# Patient Record
Sex: MALE | Race: WHITE | NOT HISPANIC OR LATINO | Employment: OTHER | URBAN - METROPOLITAN AREA
[De-identification: names, ages, dates, MRNs, and addresses within clinical notes are randomized per-mention and may not be internally consistent; named-entity substitution may affect disease eponyms.]

---

## 2017-01-05 ENCOUNTER — ALLSCRIPTS OFFICE VISIT (OUTPATIENT)
Dept: OTHER | Facility: OTHER | Age: 52
End: 2017-01-05

## 2017-01-18 ENCOUNTER — TRANSCRIBE ORDERS (OUTPATIENT)
Dept: ADMINISTRATIVE | Facility: HOSPITAL | Age: 52
End: 2017-01-18

## 2017-01-18 DIAGNOSIS — N63.10 MASS OF RIGHT BREAST: Primary | ICD-10-CM

## 2017-01-20 ENCOUNTER — HOSPITAL ENCOUNTER (OUTPATIENT)
Dept: RADIOLOGY | Facility: HOSPITAL | Age: 52
Discharge: HOME | End: 2017-01-20
Attending: FAMILY MEDICINE
Payer: COMMERCIAL

## 2017-01-20 DIAGNOSIS — N63.10 MASS OF RIGHT BREAST: ICD-10-CM

## 2017-01-20 PROCEDURE — 76642 ULTRASOUND BREAST LIMITED: CPT

## 2017-03-02 ENCOUNTER — HOSPITAL ENCOUNTER (OUTPATIENT)
Dept: RADIOLOGY | Facility: HOSPITAL | Age: 52
Discharge: HOME | End: 2017-03-02
Attending: ORTHOPAEDIC SURGERY
Payer: COMMERCIAL

## 2017-03-02 ENCOUNTER — ALLSCRIPTS OFFICE VISIT (OUTPATIENT)
Dept: OTHER | Facility: OTHER | Age: 52
End: 2017-03-02

## 2017-03-02 DIAGNOSIS — Z47.1 AFTERCARE FOLLOWING JOINT REPLACEMENT SURGERY: ICD-10-CM

## 2017-03-02 PROCEDURE — 73502 X-RAY EXAM HIP UNI 2-3 VIEWS: CPT

## 2017-03-22 ENCOUNTER — ALLSCRIPTS OFFICE VISIT (OUTPATIENT)
Dept: OTHER | Facility: OTHER | Age: 52
End: 2017-03-22

## 2017-03-30 ENCOUNTER — GENERIC CONVERSION - ENCOUNTER (OUTPATIENT)
Dept: OTHER | Facility: OTHER | Age: 52
End: 2017-03-30

## 2018-01-10 NOTE — CONSULTS
Therapy  Rehabilitation Services Referral:   Patient Status: post-operative  Diagnosis: Right Anterior total hip arthroplasty  Rehabilitation Services: evaluate and treat patient as needed and initiate a home exercise program  Precautions/Comments: It is advised per Dr Gracie Kasper for the patient to attend physical therapy 2 times a week for 4 weeks  Frequency: 2 times per week, for 4 weeks  Please send progress report  I hereby certify that the services indicated above are medically necessary  Chief Complaint  Chief Complaint Free Text Note Form: Right hip pain      History of Present Illness  HPI: Patient comes in today with complaints of right hip pain  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  Over the last few weeks its gotten worse  He has not had any treatment to date  Review of Systems  Focused-Male Orthopedics:   Constitutional: No fever or chills, feels well, no tiredness, no recent weight loss or weight gain  Eyes: No complaints of red eyes, no eyesight problems  ENT: no complaints of loss of hearing, no nosebleeds, no sore throat  Cardiovascular: No complaints of chest pain, no palpitations, no leg claudication or lower extremity edema  Respiratory: No complaints of shortness of breath, no wheezing, no cough  Gastrointestinal: No complaints of abdominal pain, no constipation, no nausea or vomiting, no diarrhea or bloody stools  Genitourinary: No complaints of dysuria or incontinence, no hesitancy, no nocturia  Musculoskeletal: as noted in HPI  Integumentary: No complaints of skin rash or lesion, no itching or dry skin, no skin wounds  Neurological: No complaints of headache, no confusion, no numbness or tingling, no dizziness  Psychiatric: No suicidal thoughts, no anxiety, no depression  Endocrine: No muscle weakness, no frequent urination, no excessive thirst, no feelings of weakness        Past Medical History  Active Problems And Past Medical History Reviewed: The active problems and past medical history were reviewed and updated today  Surgical History  Surgical History Reviewed: The surgical history was reviewed and updated today  Family History  Family History Reviewed: The family history was reviewed and updated today  Social History  Social History Reviewed: The social history was reviewed and updated today  The social history was reviewed and is unchanged  Current Meds  Medication List Reviewed: The medication list was reviewed and updated today  Physical Exam  Musculoskeletal Multi-System Exam:  Physical exam of the right hip there is pain with internal and external rotation  Negative straight leg raise  Normal sensation in the right lower extremity  Distal pulse present in the right lower extremity  NVI  Constitutional - General appearance: Normal    Psychiatric - Orientation to person, place, and time: Normal  Mood and affect: Normal       Results/Data  Xray: Hip:   Views: of the right hip  Findings: no fracture and no dislocation  Diagnostic Studies Reviewed: I personally reviewed the films/images/results in the office today  My interpretation follows  X-ray Review X-ray of the right hip Shows osteoarthritis  Discussion/Summary  Discussion Summary:   Patient was seen and examined by Dr Comer Kayser and myself today and we discussed with the patient that he does have asked arthritis in his right hip  We are going to send him for her right hip intra-articular cortisone injection  Physical therapy will also be set up for the patient today  All questions were Answered to the patient's satisfaction And we'll see him back in the office In 3 weeks for followup evaluation        Signatures   Electronically signed by : Wilfredo Castelan, [facility]; Oct 20 2016 11:09AM EST                       (Author)

## 2018-01-12 VITALS
HEART RATE: 77 BPM | WEIGHT: 247 LBS | RESPIRATION RATE: 17 BRPM | BODY MASS INDEX: 34.58 KG/M2 | SYSTOLIC BLOOD PRESSURE: 137 MMHG | DIASTOLIC BLOOD PRESSURE: 88 MMHG | HEIGHT: 71 IN

## 2018-01-13 VITALS
HEIGHT: 71 IN | HEART RATE: 73 BPM | SYSTOLIC BLOOD PRESSURE: 136 MMHG | BODY MASS INDEX: 34.58 KG/M2 | WEIGHT: 247 LBS | DIASTOLIC BLOOD PRESSURE: 86 MMHG

## 2018-01-14 VITALS
HEIGHT: 71 IN | HEART RATE: 74 BPM | SYSTOLIC BLOOD PRESSURE: 150 MMHG | BODY MASS INDEX: 34.58 KG/M2 | DIASTOLIC BLOOD PRESSURE: 102 MMHG | WEIGHT: 247 LBS

## 2018-02-02 ENCOUNTER — OFFICE VISIT (OUTPATIENT)
Dept: PODIATRY | Facility: CLINIC | Age: 53
End: 2018-02-02
Payer: COMMERCIAL

## 2018-02-02 VITALS
HEART RATE: 87 BPM | RESPIRATION RATE: 17 BRPM | DIASTOLIC BLOOD PRESSURE: 87 MMHG | WEIGHT: 231 LBS | BODY MASS INDEX: 32.34 KG/M2 | SYSTOLIC BLOOD PRESSURE: 137 MMHG | HEIGHT: 71 IN

## 2018-02-02 DIAGNOSIS — R26.2 DIFFICULTY WALKING: ICD-10-CM

## 2018-02-02 DIAGNOSIS — M21.969 DEFORMITY OF FOOT, UNSPECIFIED LATERALITY: ICD-10-CM

## 2018-02-02 DIAGNOSIS — Q66.50 CONGENITAL PES PLANUS, UNSPECIFIED LATERALITY: Primary | ICD-10-CM

## 2018-02-02 DIAGNOSIS — M21.962 DEFORMITY OF LEFT FOOT: ICD-10-CM

## 2018-02-02 DIAGNOSIS — M79.671 PAIN IN BOTH FEET: ICD-10-CM

## 2018-02-02 DIAGNOSIS — M79.672 LEFT FOOT PAIN: ICD-10-CM

## 2018-02-02 DIAGNOSIS — M79.672 PAIN IN BOTH FEET: ICD-10-CM

## 2018-02-02 DIAGNOSIS — M25.572 PAIN IN JOINTS OF BOTH FEET: ICD-10-CM

## 2018-02-02 DIAGNOSIS — M25.571 PAIN IN JOINTS OF BOTH FEET: ICD-10-CM

## 2018-02-02 PROCEDURE — 99212 OFFICE O/P EST SF 10 MIN: CPT | Performed by: PODIATRIST

## 2018-02-02 PROCEDURE — L1902 AFO ANKLE GAUNTLET PRE OTS: HCPCS | Performed by: PODIATRIST

## 2018-02-02 RX ORDER — EZETIMIBE 10 MG/1
10 TABLET ORAL DAILY
Refills: 1 | COMMUNITY
Start: 2018-01-26 | End: 2019-11-14

## 2018-02-02 RX ORDER — LOSARTAN POTASSIUM 100 MG/1
100 TABLET ORAL
COMMUNITY
Start: 2017-05-24 | End: 2021-07-26

## 2018-02-02 RX ORDER — SIMVASTATIN 40 MG
TABLET ORAL
COMMUNITY

## 2018-02-02 RX ORDER — MELOXICAM 15 MG/1
TABLET ORAL
Refills: 0 | COMMUNITY
Start: 2017-12-21 | End: 2020-12-22

## 2018-02-02 RX ORDER — MELOXICAM 15 MG/1
15 TABLET ORAL
COMMUNITY
Start: 2018-01-17 | End: 2019-01-17

## 2018-02-02 RX ORDER — ETODOLAC 400 MG/1
400 TABLET, FILM COATED ORAL 2 TIMES DAILY
Qty: 60 TABLET | Refills: 0 | Status: SHIPPED | OUTPATIENT
Start: 2018-02-02 | End: 2018-02-23 | Stop reason: SDUPTHER

## 2018-02-02 NOTE — PROGRESS NOTES
Assessment/Plan:  Tendinitis secondary to pronation syndrome  Patient has osteoarthritis  Plan  We will immobilize patient's left lower extremity was semi custom molded ankle-foot orthotic  We will changes anti-inflammatory medication  Patient may need custom molded bracing  Patient may need arch reconstruction  He will return in 3 weeks for evaluation     There are no diagnoses linked to this encounter  Discussion/Summary  Possible side effects of new medications were reviewed with the patient/guardian today  The treatment plan was reviewed with the patient/guardian  The patient/guardian understands and agrees with the treatment plan      Chief Complaint  B/L foot pain      History of Present Illness  HPI: Patient has nonspecific pain and swelling in his feet at the end of the day  Patient also gets tingling in his toes  He does not have a history of low back pain  He has no problem with his hands  Review of Systems     Constitutional: No fever or chills, feels well, no tiredness, no recent weight loss or weight gain  Eyes: No complaints of red eyes, no eyesight problems  ENT: no complaints of loss of hearing, no nosebleeds, no sore throat  Cardiovascular: No complaints of chest pain, no palpitations, no leg claudication or lower extremity edema  Respiratory: No complaints of shortness of breath, no wheezing, no cough  Gastrointestinal: No complaints of abdominal pain, no constipation, no nausea or vomiting, no diarrhea or bloody stools  Genitourinary: No complaints of dysuria or incontinence, no hesitancy, no nocturia  Musculoskeletal: as noted in HPI  Integumentary: No complaints of skin rash or lesion, no itching or dry skin, no skin wounds  Neurological: No complaints of headache, no confusion, no numbness or tingling, no dizziness  Psychiatric: No suicidal thoughts, no anxiety, no depression     Endocrine: No muscle weakness, no frequent urination, no excessive thirst, no feelings of weakness  Active Problems   1  Acquired flat foot (734) (M21 40)   2  Acquired Hallux Valgus (735 0)   3  Acquired pes planus (734) (M21 40)   4  Aftercare following right hip joint replacement surgery (V54 81,V43 64) (Z47 1,Z96 641)   5  Arthralgia of ankle or foot (719 47) (M25 579)   6  Calcaneal spur (726 73) (M77 30)   7  Congenital pes planus of left foot (754 61) (Q66 52)   8  Difficulty in walking (719 7) (R26 2)   9  Pain in extremity (729 5) (M79 609)   10  Plantar fibromatosis (728 71) (M72 2)   11  Posterior tibial tendinitis, left (726 72) (M76 822)   12  Primary localized osteoarthrosis of right hip (715 15) (M16 11)   13  Primary localized osteoarthrosis of the hip (715 15) (M16 10)   14  Right hip pain (719 45) (M25 551)     Past Medical History     The active problems and past medical history were reviewed and updated today  Surgical History   · History of Wrist Surgery     The surgical history was reviewed and updated today  Family History     The family history was reviewed and updated today  Social History   · Never a smoker   · Never A Smoker  The social history was reviewed and updated today  The social history was reviewed and is unchanged  Current Meds   1  AmLODIPine Besylate 5 MG Oral Tablet; Therapy: (Recorded:11Jun2015) to Recorded   2  CoQ-10 100 MG Oral Capsule; Therapy: (Recorded:18Oct2013) to Recorded   3  Meloxicam 15 MG Oral Tablet; TAKE 1 TABLET DAILY WITH FOOD; Therapy: 62DFW5686 to (Jany Gandara)  Requested for: 24HZP0382; Last   Rx:05Jan2017 Ordered   4  Meloxicam 15 MG Oral Tablet; TAKE 1 TABLET DAILY WITH FOOD; Therapy: 91LTQ7487 to (Jerry Huffman)  Requested for: 35RVN9576; Last   Rx:08Jan2016 Ordered   5  Meloxicam 15 MG Oral Tablet; TAKE 1 TABLET DAILY WITH FOOD; Therapy: 41EPI8919 to (Evaluate:06Jan2017)  Requested for: 77Mne4444; Last   Rx:70Dzi6046 Ordered   6  NexIUM PACK;    Therapy: (Recorded:20Oct2016) to Recorded   7  Simvastatin 40 MG Oral Tablet; Therapy: (Recorded:55Sos2791) to Recorded     The medication list was reviewed and updated today  Allergies   1  No Known Drug Allergies     Vitals    Recorded: 83ZZS1739 01:18PM   Heart Rate 77   Respiration 17   Systolic 864   Diastolic 88   Height 5 ft 11 in   Weight 247 lb    BMI Calculated 34 45   BSA Calculated 2 31      Physical Exam  Left Foot: Appearance: Normal except as noted: excessive aejkafxvzNIl0lyg planus  Great toe deformities include a bunion  The left foot demonstrates the metatarsus adductus  Tenderness: None except the medial longitudinal arch,Cn3qtgfjk orukwynRDq2tfnlnswlk of the plantar fascia, tdtZc7kgs of the medial odevfvpbhjYNy6lga the posterior tibialis tendon  ROM: subtalar motion was restricted  Motor: Normal   Right Foot: Appearance: Normal except as noted: excessive pronation  Great toe deformities include a bunion  Left Ankle: Tenderness: None except the posterior tibialis tendon  ROM: limited ROM in all planes Motor: Normal    Right Ankle: ROM: limited ROM in all planes   Neurological Exam: performed  Light touch was intact bilaterally  Vibratory sensation was intact bilaterally  Response to monofilament test was intact bilaterally  Deep tendon reflexes: patellar reflex present kckgtanizxiAPu3dohrxroj reflex present bilaterally  Vascular Exam: performed Dorsalis pedis pulses were present bilaterally  Posterior tibial pulses were present bilaterally  Elevation Pallor: absent bilaterally  Dependence rubor was absent bilaterally  Edema: moderate gbgoorgczisPAj62/4 pitting edema bilateral  Negative Homans sign bilateral    Toenails: All of the toenails were hypertrophied  Hyperkeratosis: present on both first toes  Subjective:     Patient ID: Yariel Lindsay is a 47 y o  male  Patient has continuing and ongoing pain in his feet  He has pain at the end of the day  No history of trauma    Patient has pain with ambulation  He is using orthotics  He has seronegative for arthropathy  Patient has not have Lyme  Review of Systems      Objective:      Foot ExamPhysical Exam

## 2018-02-23 ENCOUNTER — OFFICE VISIT (OUTPATIENT)
Dept: PODIATRY | Facility: CLINIC | Age: 53
End: 2018-02-23
Payer: COMMERCIAL

## 2018-02-23 VITALS — HEIGHT: 71 IN | BODY MASS INDEX: 33.6 KG/M2 | WEIGHT: 240 LBS

## 2018-02-23 DIAGNOSIS — M25.571 PAIN IN JOINTS OF BOTH FEET: ICD-10-CM

## 2018-02-23 DIAGNOSIS — Q66.50 CONGENITAL PES PLANUS, UNSPECIFIED LATERALITY: ICD-10-CM

## 2018-02-23 DIAGNOSIS — M76.822 POSTERIOR TIBIAL TENDINITIS OF LEFT LOWER EXTREMITY: ICD-10-CM

## 2018-02-23 DIAGNOSIS — M25.572 ARTHRALGIA OF LEFT FOOT: ICD-10-CM

## 2018-02-23 DIAGNOSIS — M25.571 ARTHRALGIA OF RIGHT FOOT: Primary | ICD-10-CM

## 2018-02-23 DIAGNOSIS — M25.572 PAIN IN JOINTS OF BOTH FEET: ICD-10-CM

## 2018-02-23 PROCEDURE — 20605 DRAIN/INJ JOINT/BURSA W/O US: CPT | Performed by: PODIATRIST

## 2018-02-23 PROCEDURE — 99212 OFFICE O/P EST SF 10 MIN: CPT | Performed by: PODIATRIST

## 2018-02-23 RX ORDER — ETODOLAC 400 MG/1
400 TABLET, FILM COATED ORAL 2 TIMES DAILY
Qty: 60 TABLET | Refills: 0 | Status: SHIPPED | OUTPATIENT
Start: 2018-02-23 | End: 2020-06-02

## 2018-02-23 RX ORDER — EZETIMIBE AND SIMVASTATIN 10; 10 MG/1; MG/1
1 TABLET ORAL
COMMUNITY
End: 2020-12-22

## 2018-02-23 NOTE — PROGRESS NOTES
Procedures   Foot Exam    Review of Systems     Constitutional: No fever or chills, feels well, no tiredness, no recent weight loss or weight gain  Eyes: No complaints of red eyes, no eyesight problems  ENT: no complaints of loss of hearing, no nosebleeds, no sore throat  Cardiovascular: No complaints of chest pain, no palpitations, no leg claudication or lower extremity edema  Respiratory: No complaints of shortness of breath, no wheezing, no cough  Gastrointestinal: No complaints of abdominal pain, no constipation, no nausea or vomiting, no diarrhea or bloody stools  Genitourinary: No complaints of dysuria or incontinence, no hesitancy, no nocturia  Musculoskeletal: as noted in HPI  Integumentary: No complaints of skin rash or lesion, no itching or dry skin, no skin wounds  Neurological: No complaints of headache, no confusion, no numbness or tingling, no dizziness  Psychiatric: No suicidal thoughts, no anxiety, no depression     Endocrine: No muscle weakness, no frequent urination, no excessive thirst, no feelings of weakness       Active Problems   1  Acquired flat foot (734) (M21 40)   2  Acquired Hallux Valgus (735 0)   3  Acquired pes planus (734) (M21 40)   4  Aftercare following right hip joint replacement surgery (V54 81,V43 64) (Z47 1,Z96 641)   5  Arthralgia of ankle or foot (719 47) (M25 579)   6  Calcaneal spur (726 73) (M77 30)   7  Congenital pes planus of left foot (754 61) (Q66 52)   8  Difficulty in walking (719 7) (R26 2)   9  Pain in extremity (729 5) (M79 609)   10  Plantar fibromatosis (728 71) (M72 2)   11  Posterior tibial tendinitis, left (726 72) (B73 805)   12  Primary localized osteoarthrosis of right hip (715 15) (M16 11)   13  Primary localized osteoarthrosis of the hip (715 15) (M16 10)   14  Right hip pain (719 45) (M25 551)     Past Medical History     The active problems and past medical history were reviewed and updated today       Surgical History   · History of Wrist Surgery     The surgical history was reviewed and updated today        Family History     The family history was reviewed and updated today        Social History   · Never a smoker   · Never A Smoker  The social history was reviewed and updated today  The social history was reviewed and is unchanged       Current Meds   1  AmLODIPine Besylate 5 MG Oral Tablet;   Therapy: (Recorded:11Jun2015) to Recorded   2  CoQ-10 100 MG Oral Capsule;   Therapy: (COVXCRNL:76WLA9657) to Recorded   3  Meloxicam 15 MG Oral Tablet; TAKE 1 TABLET DAILY WITH FOOD;  Casey Milks: 33WBB7523 to (Fadi Ridley)  Requested for: 19RWK2044; Last   Rx:05Jan2017 Ordered   4  Meloxicam 15 MG Oral Tablet; TAKE 1 TABLET DAILY WITH FOOD;   Therapy: 16SXI8749 to (Nic Conway)  Requested for: 31ELV9873; Last   Rx:08Jan2016 Ordered   5  Meloxicam 15 MG Oral Tablet; TAKE 1 TABLET DAILY WITH FOOD;  Casey Milks: 79SBE4826 to (Evaluate:06Jan2017)  Requested for: 96Wgc8504; Last   Rx:00Aqa0332 Ordered   6  NexIUM PACK;   Therapy: (Imani Aleman) to Recorded   7  Simvastatin 40 MG Oral Tablet;   Therapy: (Recorded:18Oct2013) to Recorded     The medication list was reviewed and updated today       Allergies   1  No Known Drug Allergies     Vitals    Recorded: 22Mar2017 01:18PM   Heart Rate 77   Respiration 17   Systolic 279   Diastolic 88   Height 5 ft 11 in   Weight 247 lb    BMI Calculated 34 45   BSA Calculated 2 31      Physical Exam  Left Foot: Appearance: Normal except as noted: excessive kpmwvnitoQUe8umj planus  Great toe deformities include a bunion  The left foot demonstrates the metatarsus adductus  Tenderness: None except the medial longitudinal arch,So8tvkhyw znwyswjRLj2sxhxnvyxw of the plantar fascia, xlbHd5wmz of the medial ziimvxnanbYTi5wki the posterior tibialis tendon  ROM: subtalar motion was restricted  Motor: Normal   Right Foot: Appearance: Normal except as noted: excessive pronation   Great toe deformities include a bunion  Left Ankle: Tenderness: None except the posterior tibialis tendon  ROM: limited ROM in all planes Motor: Normal    Right Ankle: ROM: limited ROM in all planes   Neurological Exam: performed  Light touch was intact bilaterally  Vibratory sensation was intact bilaterally  Response to monofilament test was intact bilaterally  Deep tendon reflexes: patellar reflex present qhjimchktcnCOi1vycbijhz reflex present bilaterally  Vascular Exam: performed Dorsalis pedis pulses were present bilaterally  Posterior tibial pulses were present bilaterally  Elevation Pallor: absent bilaterally  Dependence rubor was absent bilaterally  Edema: moderate ygpnqqkbumwDPi72/4 pitting edema bilateral  Negative Homans sign bilateral    Toenails: All of the toenails were hypertrophied  Hyperkeratosis: present on both first toes       Patient has continued pain in both ankles  Patient has history of hip replacement  Patient has osteoarthritis  Plan  Today bilateral ankle joint arthrocentesis done  Into each ankle, 1 cc of Kenalog 10 was injected without complication  MRI will be ordered to rule out left ankle tendinitis origin  Physical therapy will be ordered  Patient will use bracing

## 2018-03-06 ENCOUNTER — TELEPHONE (OUTPATIENT)
Dept: PODIATRY | Facility: CLINIC | Age: 53
End: 2018-03-06

## 2018-03-19 VITALS — HEIGHT: 71 IN | WEIGHT: 258 LBS | BODY MASS INDEX: 36.12 KG/M2

## 2018-03-19 DIAGNOSIS — M76.822 POSTERIOR TIBIAL TENDON DYSFUNCTION, LEFT: Primary | ICD-10-CM

## 2018-03-19 PROCEDURE — 99244 OFF/OP CNSLTJ NEW/EST MOD 40: CPT | Performed by: ORTHOPAEDIC SURGERY

## 2018-03-19 NOTE — PROGRESS NOTES
H&P Exam - Orthopedics   Link Mauricio 47 y o  male MRN: [id number]  Unit/Bed#:  Encounter: [id number]    Assessment/Plan     Assessment:  Posterior tibialis tendon dysfunction, left  Plan:  Marie Davila has chronic posterior tibialis tendon dysfunction  There is a partial tear on MRI today though I do not feel this is a surgical case at this time  In my opinion he will benefit from continued physical therapy and the use of his orthotics  He will follow up with me as needed  We did discuss possible need in the future for tendon transfer and calcaneal osteotomy  Scribe Attestation    I,:   Chio Brand MA am acting as a scribe while in the presence of the attending physician :        I,:   Nanda Loera MD personally performed the services described in this documentation    as scribed in my presence :            History of Present Illness   HPI:  Link Mauricio is a 47 y o  male who was referred to us today by Dr Jason Blue for a partial tear of the left posterior tibialis tendon  This is a chronic issue and has been prescribed physical therapy as well as orthotics  Marie Davila states he experienced immediate improvement with physical therapy  His complaint today is of the persistent mild to moderate ache about the medial ankle following a day of work  The pain will radiate into the foot on occasion 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  Previously his pain was quite severe but is grateful for the effort of his physical therapist who has decreased his pain significantly  Review of Systems   Constitutional: Negative for chills, fever and unexpected weight change  HENT: Negative for hearing loss, nosebleeds and sore throat  Eyes: Negative for pain, redness and visual disturbance  Respiratory: Negative for cough, shortness of breath and wheezing  Cardiovascular: Negative for chest pain, palpitations and leg swelling     Gastrointestinal: Negative for abdominal pain, nausea and vomiting  Endocrine: Negative for polyphagia and polyuria  Genitourinary: Negative for dysuria and hematuria  Musculoskeletal:        See HPI   Skin: Negative for rash and wound  Neurological: Negative for dizziness, numbness and headaches  Psychiatric/Behavioral: Negative for decreased concentration and suicidal ideas  The patient is not nervous/anxious  Historical Information   Past Medical History:   Diagnosis Date    Acquired pes planus     Arthralgia of ankle     Calcaneal spur     Chronic hip pain     Essential hypertension 2/28/2016    Flat foot     Flat foot 2/28/2016    GERD (gastroesophageal reflux disease)     Hallux valgus     High cholesterol     Hyperlipidemia 2/28/2016    Hypertension     Plantar fibromatosis     Posterior tibial tendinitis      Past Surgical History:   Procedure Laterality Date    COLONOSCOPY      EVACUATION OF HEMATOMA Left     knee    HAND SURGERY      reconstruction of tendons in hand s/p inury/laceration    OSTEOCHONDROMA EXCISION      left knee    CO TOTAL HIP ARTHROPLASTY Right 2/29/2016    Procedure: ARTHROPLASTY HIP TOTAL ANTERIOR;  Surgeon: Kimberlyn Mora MD;  Location: [facility];  Service: Orthopedics     Social History   History   Alcohol Use    16 8 oz/week    28 Cans of beer per week     History   Drug Use No     History   Smoking Status    Never Smoker   Smokeless Tobacco    Never Used     Family History:   Family History   Problem Relation Age of Onset    Diabetes Mother     Atrial fibrillation Mother     Lymphoma Father     COPD Father     Heart disease Father        Meds/Allergies   all medications and allergies reviewed  No Known Allergies    Objective   Vitals: Height 5' 11" (1 803 m), weight 117 kg (258 lb)  ,Body mass index is 35 98 kg/m²      [unfilled]    [unfilled]    Invasive Devices          No matching active lines, drains, or airways          Physical Exam   Constitutional: He is oriented to person, place, and time  He appears well-developed and well-nourished  HENT:   Head: Normocephalic and atraumatic  Eyes: Conjunctivae and EOM are normal    Neck: Normal range of motion  Cardiovascular: Normal rate  Pulmonary/Chest: Effort normal    Musculoskeletal: Normal range of motion  Neurological: He is alert and oriented to person, place, and time  Skin: Skin is warm and dry  Psychiatric: He has a normal mood and affect  His behavior is normal  Judgment and thought content normal      Left Ankle Exam   Swelling: mild (medial)    Tenderness   Left ankle tenderness location: posterior tibialis tendon tenderness distal of the medial malleolus       Range of Motion   Dorsiflexion: normal   Plantar flexion: normal   Inversion: normal   Eversion: normal     Muscle Strength   Dorsiflexion:  5/5   Plantar flexion:  5/5   Anterior tibial:  5/5   Posterior tibial:  4/5  Peroneal muscle:  5/5    Tests   Anterior drawer: negative  Varus tilt: negative    Other   Erythema: absent  Sensation: normal  Pulse: present    Comments:  Pes planus            Lab Results:   None todayLab Results:  Imaging: MRI of the left ankle taken from the outside source was reviewed today and presents with a partial tear of the posterior tibialis tendon  EKG, Pathology, and Other Studies: None today    Code Status: [unfilled]  Advance Directive and Living Will:      Power of :    POLST:

## 2018-03-23 ENCOUNTER — OFFICE VISIT (OUTPATIENT)
Dept: PODIATRY | Facility: CLINIC | Age: 53
End: 2018-03-23
Payer: COMMERCIAL

## 2018-03-23 VITALS — BODY MASS INDEX: 36.12 KG/M2 | WEIGHT: 258 LBS | RESPIRATION RATE: 17 BRPM | HEIGHT: 71 IN

## 2018-03-23 DIAGNOSIS — M76.822 POSTERIOR TIBIAL TENDINITIS OF LEFT LOWER EXTREMITY: Primary | ICD-10-CM

## 2018-03-23 DIAGNOSIS — Q66.51 CONGENITAL PES PLANUS OF RIGHT FOOT: ICD-10-CM

## 2018-03-23 DIAGNOSIS — Q66.52 CONGENITAL PES PLANUS OF LEFT FOOT: ICD-10-CM

## 2018-03-23 DIAGNOSIS — M25.572 PAIN IN JOINTS OF BOTH FEET: ICD-10-CM

## 2018-03-23 DIAGNOSIS — M21.969 ACQUIRED DEFORMITY OF FOOT, UNSPECIFIED LATERALITY: ICD-10-CM

## 2018-03-23 DIAGNOSIS — M25.571 PAIN IN JOINTS OF BOTH FEET: ICD-10-CM

## 2018-03-23 PROCEDURE — 99213 OFFICE O/P EST LOW 20 MIN: CPT | Performed by: PODIATRIST

## 2018-03-23 PROCEDURE — L3000 FT INSERT UCB BERKELEY SHELL: HCPCS | Performed by: PODIATRIST

## 2018-03-23 NOTE — PROGRESS NOTES
Assessment/Plan:  Tendinitis secondary to pes planus and abnormal gait  Plan  Patient will continue therapy  His feet have been casted for custom molded foot orthotics  No problem-specific Assessment & Plan notes found for this encounter  There are no diagnoses linked to this encounter  Subjective:      Patient ID: Felicia Castellano is a 47 y o  male  Patient is doing better  He has diagnosis of tendinitis  He is now undergoing physical therapy  He was seen by Orthopedics  No surgery indicated  He is using bracing as directed  The following portions of the patient's history were reviewed and updated as appropriate: allergies, current medications, past family history, past medical history, past social history, past surgical history and problem list     Review of Systems      Objective:  Review of Systems     Constitutional: No fever or chills, feels well, no tiredness, no recent weight loss or weight gain  Eyes: No complaints of red eyes, no eyesight problems  ENT: no complaints of loss of hearing, no nosebleeds, no sore throat  Cardiovascular: No complaints of chest pain, no palpitations, no leg claudication or lower extremity edema  Respiratory: No complaints of shortness of breath, no wheezing, no cough  Gastrointestinal: No complaints of abdominal pain, no constipation, no nausea or vomiting, no diarrhea or bloody stools  Genitourinary: No complaints of dysuria or incontinence, no hesitancy, no nocturia  Musculoskeletal: as noted in HPI  Integumentary: No complaints of skin rash or lesion, no itching or dry skin, no skin wounds  Neurological: No complaints of headache, no confusion, no numbness or tingling, no dizziness  Psychiatric: No suicidal thoughts, no anxiety, no depression     Endocrine: No muscle weakness, no frequent urination, no excessive thirst, no feelings of weakness       Active Problems   1  Acquired flat foot (734) (M21 40)   2  Acquired Hallux Valgus (735 0)   3  Acquired pes planus (734) (M21 40)   4  Aftercare following right hip joint replacement surgery (V54 81,V43 64) (Z47 1,Z96 641)   5  Arthralgia of ankle or foot (719 47) (M25 579)   6  Calcaneal spur (726 73) (M77 30)   7  Congenital pes planus of left foot (754 61) (Q66 52)   8  Difficulty in walking (719 7) (R26 2)   9  Pain in extremity (729 5) (M79 609)   10  Plantar fibromatosis (728 71) (M72 2)   11  Posterior tibial tendinitis, left (726 72) (L53 333)   12  Primary localized osteoarthrosis of right hip (715 15) (M16 11)   13  Primary localized osteoarthrosis of the hip (715 15) (M16 10)   14  Right hip pain (719 45) (M25 551)     Past Medical History     The active problems and past medical history were reviewed and updated today       Surgical History   · History of Wrist Surgery     The surgical history was reviewed and updated today        Family History     The family history was reviewed and updated today        Social History   · Never a smoker   · Never A Smoker  The social history was reviewed and updated today   The social history was reviewed and is unchanged       Current Meds   1  AmLODIPine Besylate 5 MG Oral Tablet;   Therapy: (Recorded:2015) to Recorded   2  CoQ-10 100 MG Oral Capsule;   Therapy: ([id number]) to Recorded   3  Meloxicam 15 MG Oral Tablet; TAKE 1 TABLET DAILY WITH FOOD;   Therapy: [id number] to (Kofi You)  Requested for: 50UYI1656; Last   Rx:2017 Ordered   4  Meloxicam 15 MG Oral Tablet; TAKE 1 TABLET DAILY WITH FOOD;   Therapy: 30JQX2187 to (Aiden Martha)  Requested for: 71FVV4363; Last   Rx:2016 Ordered   5  Meloxicam 15 MG Oral Tablet; TAKE 1 TABLET DAILY WITH FOOD;  Reyes Buhl: 22YUL8002 to (Evaluate:2017)  Requested for: 87Wwv0657; Last   Rx:77Nfw3035 Ordered   6  NexIUM PACK;   Therapy: (Recorded:2016) to Recorded   7  Simvastatin 40 MG Oral Tablet;   Therapy: (VGFVBEKI:59SSA0044) to Recorded     The medication list was reviewed and updated today       Allergies   1  No Known Drug Allergies     Vitals    Recorded: 41LIS1491 01:18PM   Heart Rate 77   Respiration 17   Systolic 978   Diastolic 88   Height 5 ft 11 in   Weight 247 lb    BMI Calculated 34 45   BSA Calculated 2 31      Physical Exam  Left Foot: Appearance: Normal except as noted: excessive xqojfgmxcAHn6bto planus  Great toe deformities include a bunion  The left foot demonstrates the metatarsus adductus  Tenderness: None except the medial longitudinal arch,Rm1hrqtan siaglctYCv7nimiqeejv of the plantar fascia, usxLy5kmo of the medial ucmjktglewXLm6msu the posterior tibialis tendon  ROM: subtalar motion was restricted  Motor: Normal   Right Foot: Appearance: Normal except as noted: excessive pronation  Great toe deformities include a bunion  Left Ankle: Tenderness: None except the posterior tibialis tendon  ROM: limited ROM in all planes Motor: Normal    Right Ankle: ROM: limited ROM in all planes   Neurological Exam: performed  Light touch was intact bilaterally  Vibratory sensation was intact bilaterally  Response to monofilament test was intact bilaterally  Deep tendon reflexes: patellar reflex present qdwbfsjkrjwWHw5obqharyb reflex present bilaterally  Vascular Exam: performed Dorsalis pedis pulses were present bilaterally  Posterior tibial pulses were present bilaterally  Elevation Pallor: absent bilaterally  Dependence rubor was absent bilaterally  Edema: moderate opwehtqapbxIIe17/4 pitting edema bilateral  Negative Homans sign bilateral    Toenails: All of the toenails were hypertrophied  Hyperkeratosis: present on both first toes  Foot ExamPhysical Exam   Musculoskeletal:   Complete collapse of left medial arch noted  There is pain with palpation posterior tibial tendon

## 2019-08-15 ENCOUNTER — APPOINTMENT (OUTPATIENT)
Dept: LAB | Facility: HOSPITAL | Age: 54
End: 2019-08-15
Payer: COMMERCIAL

## 2019-08-15 ENCOUNTER — OFFICE VISIT (OUTPATIENT)
Dept: OBGYN CLINIC | Facility: HOSPITAL | Age: 54
End: 2019-08-15
Payer: COMMERCIAL

## 2019-08-15 ENCOUNTER — HOSPITAL ENCOUNTER (OUTPATIENT)
Dept: RADIOLOGY | Facility: HOSPITAL | Age: 54
Discharge: HOME | End: 2019-08-15
Attending: ORTHOPAEDIC SURGERY
Payer: COMMERCIAL

## 2019-08-15 VITALS
HEIGHT: 70 IN | SYSTOLIC BLOOD PRESSURE: 145 MMHG | DIASTOLIC BLOOD PRESSURE: 89 MMHG | WEIGHT: 250 LBS | BODY MASS INDEX: 35.79 KG/M2 | HEART RATE: 80 BPM

## 2019-08-15 DIAGNOSIS — Z96.641 HX OF TOTAL HIP ARTHROPLASTY, RIGHT: ICD-10-CM

## 2019-08-15 DIAGNOSIS — Z96.641 HX OF TOTAL HIP ARTHROPLASTY, RIGHT: Primary | ICD-10-CM

## 2019-08-15 LAB
CRP SERPL QL: <3 MG/L
ERYTHROCYTE [DISTWIDTH] IN BLOOD BY AUTOMATED COUNT: 12.2 % (ref 11.6–15.1)
ERYTHROCYTE [SEDIMENTATION RATE] IN BLOOD: 7 MM/HOUR (ref 0–10)
HCT VFR BLD AUTO: 42.3 % (ref 36.5–49.3)
HGB BLD-MCNC: 14.5 G/DL (ref 12–17)
MCH RBC QN AUTO: 31.5 PG (ref 26.8–34.3)
MCHC RBC AUTO-ENTMCNC: 34.3 G/DL (ref 31.4–37.4)
MCV RBC AUTO: 92 FL (ref 82–98)
PLATELET # BLD AUTO: 340 THOUSANDS/UL (ref 149–390)
PMV BLD AUTO: 9.7 FL (ref 8.9–12.7)
RBC # BLD AUTO: 4.61 MILLION/UL (ref 3.88–5.62)
WBC # BLD AUTO: 8.74 THOUSAND/UL (ref 4.31–10.16)

## 2019-08-15 PROCEDURE — 85027 COMPLETE CBC AUTOMATED: CPT

## 2019-08-15 PROCEDURE — 85652 RBC SED RATE AUTOMATED: CPT

## 2019-08-15 PROCEDURE — 86140 C-REACTIVE PROTEIN: CPT

## 2019-08-15 PROCEDURE — 36415 COLL VENOUS BLD VENIPUNCTURE: CPT

## 2019-08-15 PROCEDURE — 73502 X-RAY EXAM HIP UNI 2-3 VIEWS: CPT

## 2019-08-15 PROCEDURE — 99213 OFFICE O/P EST LOW 20 MIN: CPT | Performed by: ORTHOPAEDIC SURGERY

## 2019-08-15 RX ORDER — PANTOPRAZOLE SODIUM 40 MG/1
TABLET, DELAYED RELEASE ORAL
COMMUNITY
Start: 2019-06-13

## 2019-08-15 NOTE — PROGRESS NOTES
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  Patient states that he has been limping for about a year these and has noted an increased pain pain in the groin and buttock area as over the past couple of weeks  He is doing his regular activities despite pain and limitations, but has had to cut back  He does have a flare-up of pain about a year and half ago which he states was improved with some PT activities  Patient denies any fevers or chills  He denies any other areas of pain  Denies any falls, accidents, or injuries  He denies any other acute complaints    ROS  Review of Systems   Constitutional: Negative for fever and unexpected weight change  HENT: Negative for hearing loss, nosebleeds and sore throat  Eyes: Negative for pain, redness and visual disturbance  Respiratory: Negative for cough, shortness of breath and wheezing  Cardiovascular: Negative for chest pain, palpitations and leg swelling  Gastrointestinal: Negative for abdominal pain, nausea and vomiting  Endocrine: Negative for polydipsia and polyuria  Genitourinary: Negative for dysuria and hematuria  Skin: Negative for rash and wound  Neurological: Negative for dizziness and headaches  Psychiatric/Behavioral: Negative for agitation and suicidal ideas         Past Medical History:   Diagnosis Date    Acquired pes planus     Arthralgia of ankle     Calcaneal spur     Chronic hip pain     Essential hypertension 2/28/2016    Flat foot     Flat foot 2/28/2016    GERD (gastroesophageal reflux disease)     Hallux valgus     High cholesterol     Hyperlipidemia 2/28/2016    Hypertension     Plantar fibromatosis     Posterior tibial tendinitis      Past Surgical History:   Procedure Laterality Date    COLONOSCOPY      EVACUATION OF HEMATOMA Left     knee    HAND SURGERY      reconstruction of tendons in hand s/p inury/laceration    OSTEOCHONDROMA EXCISION      left knee    [place] TOTAL HIP ARTHROPLASTY Right 2/29/2016    Procedure: ARTHROPLASTY HIP TOTAL ANTERIOR;  Surgeon: Aakash Nolan MD;  [facility]: [facility];  Service: Orthopedics     Results Reviewed     None          Physical Exam  Physical Exam   Constitutional: He is oriented to person, place, and time  He appears well-developed and well-nourished  HENT:   Head: Normocephalic and atraumatic  Eyes: Pupils are equal, round, and reactive to light  EOM are normal    Neck: Neck supple  No tracheal deviation present  Cardiovascular: Normal rate and regular rhythm  Pulmonary/Chest: Effort normal and breath sounds normal    Abdominal: There is no guarding  Neurological: He is alert and oriented to person, place, and time  Skin: Skin is warm and dry  Psychiatric: He has a normal mood and affect  His behavior is normal      Right Hip Exam     Tenderness   The patient is experiencing tenderness in the anterior  Range of Motion   The patient has normal right hip ROM  Muscle Strength   Flexion: 5/5     Other   Sensation: normal        Imaging  I personally reviewed these images :  Joint prosthesis in good position    Procedures  None    Assessment/Plan  49 y o  male    1  Hx of total hip arthroplasty, right  - XR hip/pelv 2-3 vws right if performed; Future  - NM bone scan 3 phase; Future    At this time, will evaluate for inflammatory versus infectious process as well as possible loosening of any hardware  If his bone scan comes back positive, we will set him up with an indium 111 scan

## 2019-08-27 ENCOUNTER — HOSPITAL ENCOUNTER (OUTPATIENT)
Dept: RADIOLOGY | Facility: HOSPITAL | Age: 54
Discharge: HOME | End: 2019-08-27
Payer: COMMERCIAL

## 2019-08-27 DIAGNOSIS — Z96.641 HX OF TOTAL HIP ARTHROPLASTY, RIGHT: ICD-10-CM

## 2019-08-27 PROCEDURE — A9503 TC99M MEDRONATE: HCPCS

## 2019-08-27 PROCEDURE — 78315 BONE IMAGING 3 PHASE: CPT

## 2019-08-29 ENCOUNTER — HOSPITAL ENCOUNTER (OUTPATIENT)
Dept: RADIOLOGY | Facility: HOSPITAL | Age: 54
Discharge: HOME | End: 2019-08-29
Attending: ORTHOPAEDIC SURGERY

## 2019-08-29 ENCOUNTER — HOSPITAL ENCOUNTER (OUTPATIENT)
Dept: RADIOLOGY | Facility: HOSPITAL | Age: 54
Discharge: HOME | End: 2019-08-29
Attending: ORTHOPAEDIC SURGERY
Payer: COMMERCIAL

## 2019-08-29 ENCOUNTER — OFFICE VISIT (OUTPATIENT)
Dept: OBGYN CLINIC | Facility: HOSPITAL | Age: 54
End: 2019-08-29
Payer: COMMERCIAL

## 2019-08-29 VITALS
HEART RATE: 78 BPM | SYSTOLIC BLOOD PRESSURE: 161 MMHG | HEIGHT: 70 IN | DIASTOLIC BLOOD PRESSURE: 96 MMHG | WEIGHT: 250 LBS | BODY MASS INDEX: 35.79 KG/M2

## 2019-08-29 DIAGNOSIS — M51.36 DDD (DEGENERATIVE DISC DISEASE), LUMBAR: ICD-10-CM

## 2019-08-29 DIAGNOSIS — M54.16 LUMBAR RADICULOPATHY: Primary | ICD-10-CM

## 2019-08-29 DIAGNOSIS — M54.16 LUMBAR RADICULOPATHY: ICD-10-CM

## 2019-08-29 DIAGNOSIS — M25.512 CHRONIC LEFT SHOULDER PAIN: ICD-10-CM

## 2019-08-29 DIAGNOSIS — M19.012 ARTHRITIS OF LEFT ACROMIOCLAVICULAR JOINT: ICD-10-CM

## 2019-08-29 DIAGNOSIS — G89.29 CHRONIC LEFT SHOULDER PAIN: ICD-10-CM

## 2019-08-29 PROCEDURE — 73030 X-RAY EXAM OF SHOULDER: CPT

## 2019-08-29 PROCEDURE — 72100 X-RAY EXAM L-S SPINE 2/3 VWS: CPT

## 2019-08-29 PROCEDURE — 99214 OFFICE O/P EST MOD 30 MIN: CPT | Performed by: ORTHOPAEDIC SURGERY

## 2019-08-29 NOTE — PROGRESS NOTES
49 y o male presents to the office for follow up of right hip pain  He has history of right CELENA in February 2016  He is here today to review the results of his bone scan  He continues to experience right hip groin, lateral hip and buttock pain  His pain increases with transitional activities  His pain occasionally wakes him up at night  He denies any numbness or tingling  He also notes left shoulder pain made worse with activities  Review of Systems  Review of systems negative unless otherwise specified in HPI    Past Medical History  Past Medical History:   Diagnosis Date    Acquired pes planus     Arthralgia of ankle     Calcaneal spur     Chronic hip pain     Essential hypertension 2/28/2016    Flat foot     Flat foot 2/28/2016    GERD (gastroesophageal reflux disease)     Hallux valgus     High cholesterol     Hyperlipidemia 2/28/2016    Hypertension     Plantar fibromatosis     Posterior tibial tendinitis        Past Surgical History  Past Surgical History:   Procedure Laterality Date    COLONOSCOPY      EVACUATION OF HEMATOMA Left     knee    HAND SURGERY      reconstruction of tendons in hand s/p inury/laceration    OSTEOCHONDROMA EXCISION      left knee    MI TOTAL HIP ARTHROPLASTY Right 2/29/2016    Procedure: ARTHROPLASTY HIP TOTAL ANTERIOR;  Surgeon: Charissa Sarmiento MD;  Location: [facility];  Service: Orthopedics       Current Medications  Current Outpatient Medications on File Prior to Visit   Medication Sig Dispense Refill    acetaminophen (TYLENOL) 325 mg tablet 650 mg po q6hrs (Patient not taking: Reported on 8/15/2019) 30 tablet 0    amLODIPine (NORVASC) 10 mg tablet Take 10 mg by mouth daily   atorvastatin (LIPITOR) 20 mg tablet Take 20 mg by mouth daily   Coenzyme Q10 (COQ-10) 100 MG CAPS Take by mouth      Coenzyme Q10 (COQ10) 100 MG CAPS Take by mouth        esomeprazole (NexIUM) 20 mg capsule Take 20 mg by mouth every morning before breakfast       etodolac (LODINE) 400 MG tablet Take 1 tablet (400 mg total) by mouth 2 (two) times a day for 30 days 60 tablet 0    ezetimibe (ZETIA) 10 mg tablet Take 10 mg by mouth daily  1    ezetimibe-simvastatin (VYTORIN) 10-10 mg per tablet Take 1 tablet by mouth daily at bedtime      losartan (COZAAR) 100 MG tablet Take 100 mg by mouth      meloxicam (MOBIC) 15 mg tablet   0    NON FORMULARY 1 tablet daily Indications: vitamin c, folic acid, and iron-per meds   pantoprazole (PROTONIX) 40 mg tablet       simvastatin (ZOCOR) 40 mg tablet Take by mouth       No current facility-administered medications on file prior to visit  Recent Labs (HCT,HGB,PT,INR,ESR,CRP,GLU,HgA1C)  0   Lab Value Date/Time    HCT 42 3 08/15/2019 1501    HGB 14 5 08/15/2019 1501    WBC 8 74 08/15/2019 1501    ESR 7 08/15/2019 1501    CRP <3 0 08/15/2019 1501         Physical exam  · General: Awake, Alert, Oriented  · Eyes: Pupils equal, round and reactive to light  · Heart: regular rate and rhythm  · Lungs: No audible wheezing  · Abdomen: soft  Right hip  · Patient ambulates without assistance  · Tender to palpation anterior hip  · Full ROM  · Strength 5/5  · Sensation intact    Left shoulder  · Tender to palpation over AC joint and subacromial bursa  · Full ROM  · Strength 5/5  · Sensation intact      Imaging  Images were personally reviewed with the patient    Bone scan 08/27/2019 was reviewed and shows no signs of loosening or infection  X-rays of lumbar spine 08/29/2019 were reviewed and shows degenerative changes  X-rays of left shoulder 08/29/2019 were reviewed and shows AC joint degenerative changes      Assessment/Plan:   54 y o male with left shoulder AC joint arthritis and lumbar radiculopathy will get a lumbar MRI for further evaluation  We wll see him back after his lumbar MRI is complete      Scribe Attestation    I,:   Dorie Marmolejo am acting as a scribe while in the presence of the attending physician :        I,:   Colin Sr Laquita Jarvis MD personally performed the services described in this documentation    as scribed in my presence :

## 2019-09-16 ENCOUNTER — HOSPITAL ENCOUNTER (OUTPATIENT)
Dept: RADIOLOGY | Facility: HOSPITAL | Age: 54
Discharge: HOME | End: 2019-09-16
Attending: ORTHOPAEDIC SURGERY
Payer: COMMERCIAL

## 2019-09-16 DIAGNOSIS — M51.36 DDD (DEGENERATIVE DISC DISEASE), LUMBAR: ICD-10-CM

## 2019-09-16 PROCEDURE — 72148 MRI LUMBAR SPINE W/O DYE: CPT

## 2019-09-19 ENCOUNTER — OFFICE VISIT (OUTPATIENT)
Dept: OBGYN CLINIC | Facility: HOSPITAL | Age: 54
End: 2019-09-19
Payer: COMMERCIAL

## 2019-09-19 VITALS
HEART RATE: 70 BPM | DIASTOLIC BLOOD PRESSURE: 101 MMHG | HEIGHT: 71 IN | SYSTOLIC BLOOD PRESSURE: 159 MMHG | BODY MASS INDEX: 35 KG/M2 | WEIGHT: 250 LBS

## 2019-09-19 DIAGNOSIS — Z96.641 HX OF TOTAL HIP ARTHROPLASTY, RIGHT: Primary | ICD-10-CM

## 2019-09-19 PROCEDURE — 99213 OFFICE O/P EST LOW 20 MIN: CPT | Performed by: ORTHOPAEDIC SURGERY

## 2019-10-08 ENCOUNTER — HOSPITAL ENCOUNTER (OUTPATIENT)
Dept: RADIOLOGY | Facility: HOSPITAL | Age: 54
Discharge: HOME | End: 2019-10-08
Payer: COMMERCIAL

## 2019-10-08 DIAGNOSIS — Z96.641 HX OF TOTAL HIP ARTHROPLASTY, RIGHT: ICD-10-CM

## 2019-10-08 PROCEDURE — 77002 NEEDLE LOCALIZATION BY XRAY: CPT

## 2019-10-08 PROCEDURE — 20610 DRAIN/INJ JOINT/BURSA W/O US: CPT

## 2019-10-08 RX ORDER — LIDOCAINE HYDROCHLORIDE 10 MG/ML
3 INJECTION, SOLUTION EPIDURAL; INFILTRATION; INTRACAUDAL; PERINEURAL ONCE
Status: COMPLETED | OUTPATIENT
Start: 2019-10-08 | End: 2019-10-08

## 2019-10-08 RX ORDER — LIDOCAINE HYDROCHLORIDE 10 MG/ML
3 INJECTION, SOLUTION INFILTRATION; PERINEURAL ONCE
Status: COMPLETED | OUTPATIENT
Start: 2019-10-08 | End: 2019-10-08

## 2019-10-08 RX ADMIN — IOHEXOL 6 ML: 240 INJECTION, SOLUTION INTRATHECAL; INTRAVASCULAR; INTRAVENOUS; ORAL at 11:45

## 2019-10-08 RX ADMIN — LIDOCAINE HYDROCHLORIDE 3 ML: 10 INJECTION, SOLUTION EPIDURAL; INFILTRATION; INTRACAUDAL; PERINEURAL at 11:58

## 2019-10-08 RX ADMIN — LIDOCAINE HYDROCHLORIDE 3 ML: 10 INJECTION, SOLUTION INFILTRATION; PERINEURAL at 11:50

## 2019-10-09 ENCOUNTER — TELEPHONE (OUTPATIENT)
Dept: OBGYN CLINIC | Facility: HOSPITAL | Age: 54
End: 2019-10-09

## 2019-10-09 NOTE — TELEPHONE ENCOUNTER
Breonna from [facility] is calling   [phone]    Beverly Willard is requesting page 5 of 5 of the Physical Therapy document (scanned in to patient's chart today under Media) be faxed back to her with a doctor's signature  She states there is supposed to be a doctor's signature box on page 5 but I do not see it        Fax [phone]

## 2019-10-09 NOTE — PROGRESS NOTES
49 y o  male presenting to the office for evaluation of right hip pain  Patient has pain in the right groin, lateral hip, and buttock areas  This increases with any activities that he has been doing  He denies any numbness or tingling down the legs  He denies any low back pain  Patient states that he has had this pain previously which was resolved with physical therapy activities  He denies any signs and symptoms of systemic infection  He denies any acute complaints  Patient did get an MRI on 9/16/18 to evaluate for lumbar involvement  ROS  Review of Systems   Constitutional: Negative for fever and unexpected weight change  HENT: Negative for hearing loss, nosebleeds and sore throat  Eyes: Negative for pain, redness and visual disturbance  Respiratory: Negative for cough, shortness of breath and wheezing  Cardiovascular: Negative for chest pain, palpitations and leg swelling  Gastrointestinal: Negative for abdominal pain, nausea and vomiting  Endocrine: Negative for polydipsia and polyuria  Genitourinary: Negative for dysuria and hematuria  Skin: Negative for rash and wound  Neurological: Negative for dizziness and headaches  Psychiatric/Behavioral: Negative for agitation and suicidal ideas         Past Medical History:   Diagnosis Date    Acquired pes planus     Arthralgia of ankle     Calcaneal spur     Chronic hip pain     Essential hypertension 2/28/2016    Flat foot     Flat foot 2/28/2016    GERD (gastroesophageal reflux disease)     Hallux valgus     High cholesterol     Hyperlipidemia 2/28/2016    Hypertension     Plantar fibromatosis     Posterior tibial tendinitis      Past Surgical History:   Procedure Laterality Date    COLONOSCOPY      EVACUATION OF HEMATOMA Left     knee    FL INJECTION RIGHT HIP (NON ARTHROGRAM)  10/8/2019    HAND SURGERY      reconstruction of tendons in hand s/p inury/laceration    OSTEOCHONDROMA EXCISION      left knee    [place] TOTAL HIP ARTHROPLASTY Right 2/29/2016    Procedure: ARTHROPLASTY HIP TOTAL ANTERIOR;  Surgeon: Charissa Sarmiento MD;  Location: [facility];  Service: Orthopedics     Results Reviewed     None          Physical Exam  Physical Exam   Constitutional: He is oriented to person, place, and time  He appears well-developed and well-nourished  HENT:   Head: Normocephalic and atraumatic  Eyes: Pupils are equal, round, and reactive to light  EOM are normal    Neck: Neck supple  No tracheal deviation present  Cardiovascular: Normal rate and regular rhythm  Pulmonary/Chest: Effort normal and breath sounds normal    Abdominal: There is no guarding  Neurological: He is alert and oriented to person, place, and time  Skin: Skin is warm and dry  Psychiatric: He has a normal mood and affect  His behavior is normal      Right Hip Exam     Tenderness   The patient is experiencing no tenderness  Range of Motion   The patient has normal right hip ROM  Muscle Strength   The patient has normal right hip strength  Other   Sensation: normal        Imaging  I personally reviewed these images :  Degenerative changes noted on the MRI, however, there is no definitive evidence of nerve compression which would correlate with his current symptomatology  Procedures  None    Assessment/Plan  49 y o  male    1  Hx of total hip arthroplasty, right  - Ambulatory referral to Physical Therapy; Future  - FL injection right hip (non arthrogram); Future    Reviewed with patient that we can try a round of physical therapy as it did work for him for a similar a related pain in the past   Reviewed with patient that he could get a diagnostic anesthetic injection into the right hip to assess for pain relief  Patient to follow up in 4-6 weeks to assess for functional improvement after these interventions as well as further options for intervention

## 2019-10-11 ENCOUNTER — OFFICE VISIT (OUTPATIENT)
Dept: OBGYN CLINIC | Facility: HOSPITAL | Age: 54
End: 2019-10-11
Payer: COMMERCIAL

## 2019-10-11 VITALS
WEIGHT: 272.8 LBS | HEIGHT: 71 IN | SYSTOLIC BLOOD PRESSURE: 155 MMHG | HEART RATE: 76 BPM | DIASTOLIC BLOOD PRESSURE: 99 MMHG | BODY MASS INDEX: 38.19 KG/M2

## 2019-10-11 DIAGNOSIS — M54.16 LUMBAR RADICULOPATHY: Primary | ICD-10-CM

## 2019-10-11 PROCEDURE — 99213 OFFICE O/P EST LOW 20 MIN: CPT | Performed by: PHYSICIAN ASSISTANT

## 2019-10-11 RX ORDER — GABAPENTIN 100 MG/1
100 CAPSULE ORAL 3 TIMES DAILY
Qty: 90 CAPSULE | Refills: 0 | Status: SHIPPED | OUTPATIENT
Start: 2019-10-11 | End: 2020-06-02

## 2019-10-11 RX ORDER — METHYLPREDNISOLONE 4 MG/1
TABLET ORAL
Qty: 21 TABLET | Refills: 0 | Status: SHIPPED | OUTPATIENT
Start: 2019-10-11 | End: 2020-06-02

## 2019-10-11 NOTE — PROGRESS NOTES
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  He has been doing physical therapy as well  He denies any numbness or tingling  He denies any strength discrepancies in the right leg  He denies any other acute complaints  ROS  Review of Systems   Constitutional: Negative for fever and unexpected weight change  HENT: Negative for hearing loss, nosebleeds and sore throat  Eyes: Negative for pain, redness and visual disturbance  Respiratory: Negative for cough, shortness of breath and wheezing  Cardiovascular: Negative for chest pain, palpitations and leg swelling  Gastrointestinal: Negative for abdominal pain, nausea and vomiting  Endocrine: Negative for polydipsia and polyuria  Genitourinary: Negative for dysuria and hematuria  Skin: Negative for rash and wound  Neurological: Negative for dizziness and headaches  Psychiatric/Behavioral: Negative for agitation and suicidal ideas         Past Medical History:   Diagnosis Date    Acquired pes planus     Arthralgia of ankle     Calcaneal spur     Chronic hip pain     Essential hypertension 2/28/2016    Flat foot     Flat foot 2/28/2016    GERD (gastroesophageal reflux disease)     Hallux valgus     High cholesterol     Hyperlipidemia 2/28/2016    Hypertension     Plantar fibromatosis     Posterior tibial tendinitis      Past Surgical History:   Procedure Laterality Date    COLONOSCOPY      EVACUATION OF HEMATOMA Left     knee    FL INJECTION RIGHT HIP (NON ARTHROGRAM)  10/8/2019    HAND SURGERY      reconstruction of tendons in hand s/p inury/laceration    OSTEOCHONDROMA EXCISION      left knee    MI TOTAL HIP ARTHROPLASTY Right 2/29/2016    Procedure: ARTHROPLASTY HIP TOTAL ANTERIOR;  Surgeon: Tamara Smith MD;  [facility]: [facility];  Service: Orthopedics     Results Reviewed     None          Physical Exam  Physical Exam   Constitutional: He is oriented to person, place, and time  He appears well-developed and well-nourished  HENT:   Head: Normocephalic and atraumatic  Eyes: Pupils are equal, round, and reactive to light  EOM are normal    Neck: Neck supple  No tracheal deviation present  Cardiovascular: Normal rate and regular rhythm  Pulmonary/Chest: Effort normal and breath sounds normal    Abdominal: There is no guarding  Neurological: He is alert and oriented to person, place, and time  Skin: Skin is warm and dry  Psychiatric: He has a normal mood and affect  His behavior is normal      Right Hip Exam     Tenderness   The patient is experiencing no tenderness  Range of Motion   The patient has normal right hip ROM  Muscle Strength   The patient has normal right hip strength  Other   Sensation: normal        Assessment/Plan  49 y o  male    1  Lumbar radiculopathy  - gabapentin (NEURONTIN) 100 mg capsule; Take 1 capsule (100 mg total) by mouth 3 (three) times a day  Dispense: 90 capsule; Refill: 0  - methylPREDNISolone 4 MG tablet therapy pack; Use as directed on package  Dispense: 21 tablet; Refill: 0      Patient is getting hamstring therapy with PT   Can consider EMG to evaluate the sciatica impingement point to more accurately direct injection therapy

## 2019-11-08 ENCOUNTER — TELEPHONE (OUTPATIENT)
Dept: OBGYN CLINIC | Facility: HOSPITAL | Age: 54
End: 2019-11-08

## 2019-11-08 NOTE — TELEPHONE ENCOUNTER
Patient wants to give Morgan Jerry a message  He said that he was told to let her know if he would be coming in on 11/14/19 and he will be there at 8 per his conversation with her  He is scheduled for 8:45    Please advise if incorrect information

## 2019-11-14 ENCOUNTER — OFFICE VISIT (OUTPATIENT)
Dept: OBGYN CLINIC | Facility: HOSPITAL | Age: 54
End: 2019-11-14
Payer: COMMERCIAL

## 2019-11-14 ENCOUNTER — APPOINTMENT (OUTPATIENT)
Dept: LAB | Facility: HOSPITAL | Age: 54
End: 2019-11-14
Attending: ORTHOPAEDIC SURGERY
Payer: COMMERCIAL

## 2019-11-14 VITALS
BODY MASS INDEX: 37.52 KG/M2 | SYSTOLIC BLOOD PRESSURE: 151 MMHG | HEIGHT: 71 IN | DIASTOLIC BLOOD PRESSURE: 92 MMHG | WEIGHT: 268 LBS | HEART RATE: 76 BPM

## 2019-11-14 DIAGNOSIS — M54.16 LUMBAR RADICULOPATHY: Primary | ICD-10-CM

## 2019-11-14 DIAGNOSIS — M54.16 LUMBAR RADICULOPATHY: ICD-10-CM

## 2019-11-14 LAB
BASOPHILS # BLD AUTO: 0.03 THOUSANDS/ΜL (ref 0–0.1)
BASOPHILS NFR BLD AUTO: 0 % (ref 0–1)
EOSINOPHIL # BLD AUTO: 0.15 THOUSAND/ΜL (ref 0–0.61)
EOSINOPHIL NFR BLD AUTO: 2 % (ref 0–6)
ERYTHROCYTE [DISTWIDTH] IN BLOOD BY AUTOMATED COUNT: 12 % (ref 11.6–15.1)
HCT VFR BLD AUTO: 44.8 % (ref 36.5–49.3)
HGB BLD-MCNC: 15.3 G/DL (ref 12–17)
IMM GRANULOCYTES # BLD AUTO: 0.05 THOUSAND/UL (ref 0–0.2)
IMM GRANULOCYTES NFR BLD AUTO: 1 % (ref 0–2)
LYMPHOCYTES # BLD AUTO: 1.31 THOUSANDS/ΜL (ref 0.6–4.47)
LYMPHOCYTES NFR BLD AUTO: 19 % (ref 14–44)
MCH RBC QN AUTO: 31.2 PG (ref 26.8–34.3)
MCHC RBC AUTO-ENTMCNC: 34.2 G/DL (ref 31.4–37.4)
MCV RBC AUTO: 91 FL (ref 82–98)
MONOCYTES # BLD AUTO: 1.01 THOUSAND/ΜL (ref 0.17–1.22)
MONOCYTES NFR BLD AUTO: 15 % (ref 4–12)
NEUTROPHILS # BLD AUTO: 4.27 THOUSANDS/ΜL (ref 1.85–7.62)
NEUTS SEG NFR BLD AUTO: 63 % (ref 43–75)
NRBC BLD AUTO-RTO: 0 /100 WBCS
PLATELET # BLD AUTO: 348 THOUSANDS/UL (ref 149–390)
PMV BLD AUTO: 9.3 FL (ref 8.9–12.7)
RBC # BLD AUTO: 4.9 MILLION/UL (ref 3.88–5.62)
WBC # BLD AUTO: 6.82 THOUSAND/UL (ref 4.31–10.16)

## 2019-11-14 PROCEDURE — 99213 OFFICE O/P EST LOW 20 MIN: CPT | Performed by: ORTHOPAEDIC SURGERY

## 2019-11-14 PROCEDURE — 85025 COMPLETE CBC W/AUTO DIFF WBC: CPT

## 2019-11-14 PROCEDURE — 36415 COLL VENOUS BLD VENIPUNCTURE: CPT

## 2019-11-14 RX ORDER — AMOXICILLIN 500 MG/1
CAPSULE ORAL
Refills: 0 | COMMUNITY
Start: 2019-09-06 | End: 2021-01-05 | Stop reason: HOSPADM

## 2019-11-14 RX ORDER — CLOTRIMAZOLE AND BETAMETHASONE DIPROPIONATE 10; .64 MG/G; MG/G
1 CREAM TOPICAL 2 TIMES DAILY
Refills: 0 | COMMUNITY
Start: 2019-09-11 | End: 2021-07-14

## 2019-11-14 NOTE — PROGRESS NOTES
49 y o  male presenting to the office for evaluation of right buttock pain  Patient states that the Medrol dose pack provided significant pain relief for the 1st couple days, and then tapered off as he was finishing  Patient states that he does well in the morning with his stretches and exercises, and by the end of the day usually needs to take to add feels for pain relief  Patient has been doing physical therapy without substantial changes in symptoms  He denies any numbness or tingling  He denies any discrepancy in strength  To reiterate, he did not get any significant pain relief from the hip lidocaine injection  He denies any other acute complaints  Patient does work at a job where he needs to do a lot of heavy lifting activities  ROS  Review of Systems   Constitutional: Negative for fever and unexpected weight change  HENT: Negative for hearing loss, nosebleeds and sore throat  Eyes: Negative for pain, redness and visual disturbance  Respiratory: Negative for cough, shortness of breath and wheezing  Cardiovascular: Negative for chest pain, palpitations and leg swelling  Gastrointestinal: Negative for abdominal pain, nausea and vomiting  Endocrine: Negative for polydipsia and polyuria  Genitourinary: Negative for dysuria and hematuria  Skin: Negative for rash and wound  Neurological: Negative for dizziness and headaches  Psychiatric/Behavioral: Negative for agitation and suicidal ideas         Past Medical History:   Diagnosis Date    Acquired pes planus     Arthralgia of ankle     Calcaneal spur     Chronic hip pain     Essential hypertension 2/28/2016    Flat foot     Flat foot 2/28/2016    GERD (gastroesophageal reflux disease)     Hallux valgus     High cholesterol     Hyperlipidemia 2/28/2016    Hypertension     Plantar fibromatosis     Posterior tibial tendinitis      Past Surgical History:   Procedure Laterality Date    COLONOSCOPY      EVACUATION OF HEMATOMA Left     knee    FL INJECTION RIGHT HIP (NON ARTHROGRAM)  10/8/2019    HAND SURGERY      reconstruction of tendons in hand s/p inury/laceration    OSTEOCHONDROMA EXCISION      left knee    [place] TOTAL HIP ARTHROPLASTY Right 2/29/2016    Procedure: ARTHROPLASTY HIP TOTAL ANTERIOR;  Surgeon: Tona Mayes MD;  Location: [facility];  Service: Orthopedics     Results Reviewed     None          Physical Exam  Physical Exam   Constitutional: He is oriented to person, place, and time  He appears well-developed and well-nourished  HENT:   Head: Normocephalic and atraumatic  Eyes: Pupils are equal, round, and reactive to light  EOM are normal    Neck: Neck supple  No tracheal deviation present  Cardiovascular: Normal rate and regular rhythm  Pulmonary/Chest: Effort normal and breath sounds normal    Abdominal: There is no guarding  Neurological: He is alert and oriented to person, place, and time  Skin: Skin is warm and dry  Psychiatric: He has a normal mood and affect  His behavior is normal      Back Exam     Tenderness   The patient is experiencing no tenderness  Muscle Strength   The patient has normal back strength  Tests   Straight leg raise right: negative    Other   Sensation: normal  Gait: normal         Assessment/Plan  49 y o  male    1  Lumbar radiculopathy  - CT epidural steroid injection (RANDI lumbar); Future    Reviewed with patient that he can try a corticosteroid injection to the L4-5 area to alleviate some of his pain  Reviewed with patient he should continue with activities as tolerated, and can take his to Aleve daily as needed  Patient can call with response to injection, as this is his busy season at work

## 2019-11-27 ENCOUNTER — TRANSCRIBE ORDERS (OUTPATIENT)
Dept: RADIOLOGY | Facility: HOSPITAL | Age: 54
End: 2019-11-27

## 2019-11-27 ENCOUNTER — HOSPITAL ENCOUNTER (OUTPATIENT)
Dept: RADIOLOGY | Facility: HOSPITAL | Age: 54
Discharge: HOME | End: 2019-11-27
Admitting: RADIOLOGY
Payer: COMMERCIAL

## 2019-11-27 DIAGNOSIS — M54.16 LUMBAR RADICULOPATHY: ICD-10-CM

## 2019-11-27 PROCEDURE — 62323 NJX INTERLAMINAR LMBR/SAC: CPT

## 2019-11-27 RX ORDER — METHYLPREDNISOLONE ACETATE 80 MG/ML
80 INJECTION, SUSPENSION INTRA-ARTICULAR; INTRALESIONAL; INTRAMUSCULAR; SOFT TISSUE
Status: DISCONTINUED | OUTPATIENT
Start: 2019-11-27 | End: 2019-11-28 | Stop reason: HOSPADM

## 2019-11-27 RX ORDER — BUPIVACAINE HYDROCHLORIDE 2.5 MG/ML
10 INJECTION, SOLUTION EPIDURAL; INFILTRATION; INTRACAUDAL
Status: DISCONTINUED | OUTPATIENT
Start: 2019-11-27 | End: 2019-11-28 | Stop reason: HOSPADM

## 2019-12-04 ENCOUNTER — HOSPITAL ENCOUNTER (OUTPATIENT)
Dept: RADIOLOGY | Facility: HOSPITAL | Age: 54
Discharge: HOME | End: 2019-12-04

## 2020-06-02 ENCOUNTER — OFFICE VISIT (OUTPATIENT)
Dept: OBGYN CLINIC | Facility: HOSPITAL | Age: 55
End: 2020-06-02
Payer: COMMERCIAL

## 2020-06-02 VITALS
DIASTOLIC BLOOD PRESSURE: 97 MMHG | HEART RATE: 74 BPM | WEIGHT: 273.81 LBS | HEIGHT: 71 IN | SYSTOLIC BLOOD PRESSURE: 164 MMHG | BODY MASS INDEX: 38.33 KG/M2

## 2020-06-02 DIAGNOSIS — M54.16 LUMBAR RADICULOPATHY: Primary | ICD-10-CM

## 2020-06-02 DIAGNOSIS — Z96.641 HX OF TOTAL HIP ARTHROPLASTY, RIGHT: ICD-10-CM

## 2020-06-02 PROCEDURE — 99214 OFFICE O/P EST MOD 30 MIN: CPT | Performed by: ORTHOPAEDIC SURGERY

## 2020-06-05 ENCOUNTER — HOSPITAL ENCOUNTER (OUTPATIENT)
Dept: RADIOLOGY | Facility: HOSPITAL | Age: 55
Discharge: HOME | End: 2020-06-05
Attending: ORTHOPAEDIC SURGERY
Payer: COMMERCIAL

## 2020-06-05 DIAGNOSIS — M54.16 LUMBAR RADICULOPATHY: ICD-10-CM

## 2020-06-05 PROCEDURE — 62323 NJX INTERLAMINAR LMBR/SAC: CPT

## 2020-06-05 RX ORDER — BUPIVACAINE HYDROCHLORIDE 2.5 MG/ML
10 INJECTION, SOLUTION EPIDURAL; INFILTRATION; INTRACAUDAL
Status: DISCONTINUED | OUTPATIENT
Start: 2020-06-05 | End: 2020-06-06 | Stop reason: HOSPADM

## 2020-06-05 RX ORDER — METHYLPREDNISOLONE ACETATE 80 MG/ML
80 INJECTION, SUSPENSION INTRA-ARTICULAR; INTRALESIONAL; INTRAMUSCULAR; SOFT TISSUE
Status: DISCONTINUED | OUTPATIENT
Start: 2020-06-05 | End: 2020-06-06 | Stop reason: HOSPADM

## 2020-06-22 ENCOUNTER — OFFICE VISIT (OUTPATIENT)
Dept: PAIN MEDICINE | Facility: CLINIC | Age: 55
End: 2020-06-22
Payer: COMMERCIAL

## 2020-06-22 VITALS
TEMPERATURE: 96.8 F | HEART RATE: 88 BPM | SYSTOLIC BLOOD PRESSURE: 152 MMHG | WEIGHT: 272.8 LBS | BODY MASS INDEX: 38.19 KG/M2 | HEIGHT: 71 IN | DIASTOLIC BLOOD PRESSURE: 93 MMHG

## 2020-06-22 DIAGNOSIS — M25.551 RIGHT HIP PAIN: ICD-10-CM

## 2020-06-22 DIAGNOSIS — Z96.641 HISTORY OF TOTAL RIGHT HIP ARTHROPLASTY: ICD-10-CM

## 2020-06-22 DIAGNOSIS — G89.4 CHRONIC PAIN SYNDROME: Primary | ICD-10-CM

## 2020-06-22 PROCEDURE — 99244 OFF/OP CNSLTJ NEW/EST MOD 40: CPT | Performed by: ANESTHESIOLOGY

## 2020-06-25 ENCOUNTER — APPOINTMENT (OUTPATIENT)
Dept: RADIOLOGY | Facility: CLINIC | Age: 55
End: 2020-06-25
Payer: COMMERCIAL

## 2020-06-25 ENCOUNTER — CONSULT (OUTPATIENT)
Dept: OBGYN CLINIC | Facility: CLINIC | Age: 55
End: 2020-06-25
Payer: COMMERCIAL

## 2020-06-25 VITALS
HEIGHT: 71 IN | DIASTOLIC BLOOD PRESSURE: 95 MMHG | BODY MASS INDEX: 38.5 KG/M2 | TEMPERATURE: 97 F | WEIGHT: 275 LBS | SYSTOLIC BLOOD PRESSURE: 153 MMHG | HEART RATE: 80 BPM

## 2020-06-25 DIAGNOSIS — Z96.649 LOOSENING OF PROSTHETIC HIP, INITIAL ENCOUNTER (HCC): ICD-10-CM

## 2020-06-25 DIAGNOSIS — T84.038A LOOSENING OF PROSTHETIC HIP, INITIAL ENCOUNTER (HCC): ICD-10-CM

## 2020-06-25 DIAGNOSIS — M25.551 RIGHT HIP PAIN: Primary | ICD-10-CM

## 2020-06-25 DIAGNOSIS — M25.551 RIGHT HIP PAIN: ICD-10-CM

## 2020-06-25 DIAGNOSIS — Z96.641 HISTORY OF TOTAL RIGHT HIP ARTHROPLASTY: ICD-10-CM

## 2020-06-25 PROCEDURE — 99214 OFFICE O/P EST MOD 30 MIN: CPT | Performed by: ORTHOPAEDIC SURGERY

## 2020-06-25 PROCEDURE — 73502 X-RAY EXAM HIP UNI 2-3 VIEWS: CPT

## 2020-07-01 LAB
CRP SERPL-MCNC: 1 MG/L (ref 0–10)
ERYTHROCYTE [SEDIMENTATION RATE] IN BLOOD BY WESTERGREN METHOD: 5 MM/HR (ref 0–30)

## 2020-07-14 ENCOUNTER — HOSPITAL ENCOUNTER (OUTPATIENT)
Dept: RADIOLOGY | Facility: HOSPITAL | Age: 55
Discharge: HOME | End: 2020-07-14
Attending: ORTHOPAEDIC SURGERY
Payer: COMMERCIAL

## 2020-07-14 DIAGNOSIS — Z96.649 LOOSENING OF PROSTHETIC HIP, INITIAL ENCOUNTER (HCC): ICD-10-CM

## 2020-07-14 DIAGNOSIS — T84.038A LOOSENING OF PROSTHETIC HIP, INITIAL ENCOUNTER (HCC): ICD-10-CM

## 2020-07-14 DIAGNOSIS — M25.551 RIGHT HIP PAIN: ICD-10-CM

## 2020-07-14 DIAGNOSIS — Z96.641 HISTORY OF TOTAL RIGHT HIP ARTHROPLASTY: ICD-10-CM

## 2020-07-14 PROCEDURE — 78800 RP LOCLZJ TUM 1 AREA 1 D IMG: CPT

## 2020-07-15 ENCOUNTER — HOSPITAL ENCOUNTER (OUTPATIENT)
Dept: RADIOLOGY | Facility: HOSPITAL | Age: 55
Discharge: HOME | End: 2020-07-15
Attending: ORTHOPAEDIC SURGERY
Payer: COMMERCIAL

## 2020-07-15 PROCEDURE — 78102 BONE MARROW IMAGING LTD: CPT

## 2020-07-15 PROCEDURE — A9541 TC99M SULFUR COLLOID: HCPCS

## 2020-07-23 ENCOUNTER — TELEPHONE (OUTPATIENT)
Dept: OBGYN CLINIC | Facility: CLINIC | Age: 55
End: 2020-07-23

## 2020-07-23 NOTE — TELEPHONE ENCOUNTER
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  Treatment options were again discussed with the patient I did recommend that he pursue revision hip surgery with revision of his femoral component  He demonstrates understanding of this  We did discuss his demands of employment and due to his family's involvement with the [employer] he says that recovery would be optimized for him from January to April and is hoping to hold off surgery until January due to his work demands  I did discuss with the patient that if any changes in his clinical symptoms such as worsening pain, sudden onset of increased pain, ambulation dysfunction, or other any acute change regarding his right thigh her hip that he needs to present to my office and be re-evaluated  He demonstrates understanding this  All his questions were addressed  Ayanna VAZQUEZ    Division of Adult Reconstruction  Department of Orthopaedic Surgery  [facility]

## 2020-10-28 ENCOUNTER — OFFICE VISIT (OUTPATIENT)
Dept: OBGYN CLINIC | Facility: CLINIC | Age: 55
End: 2020-10-28
Payer: COMMERCIAL

## 2020-10-28 VITALS
SYSTOLIC BLOOD PRESSURE: 168 MMHG | WEIGHT: 282.8 LBS | HEIGHT: 71 IN | BODY MASS INDEX: 39.59 KG/M2 | HEART RATE: 86 BPM | DIASTOLIC BLOOD PRESSURE: 106 MMHG | TEMPERATURE: 97.4 F

## 2020-10-28 DIAGNOSIS — T84.038D LOOSENING OF PROSTHETIC HIP, SUBSEQUENT ENCOUNTER: Primary | ICD-10-CM

## 2020-10-28 DIAGNOSIS — Z96.641 HISTORY OF TOTAL RIGHT HIP ARTHROPLASTY: ICD-10-CM

## 2020-10-28 DIAGNOSIS — Z96.649 LOOSENING OF PROSTHETIC HIP, SUBSEQUENT ENCOUNTER: Primary | ICD-10-CM

## 2020-10-28 DIAGNOSIS — M25.551 RIGHT HIP PAIN: ICD-10-CM

## 2020-10-28 PROCEDURE — 99214 OFFICE O/P EST MOD 30 MIN: CPT | Performed by: ORTHOPAEDIC SURGERY

## 2020-10-28 RX ORDER — VITAMIN B COMPLEX
1 CAPSULE ORAL DAILY
COMMUNITY

## 2020-10-28 RX ORDER — HYDROCHLOROTHIAZIDE 12.5 MG/1
12.5 CAPSULE, GELATIN COATED ORAL DAILY
COMMUNITY
End: 2022-01-05

## 2020-12-02 ENCOUNTER — OFFICE VISIT (OUTPATIENT)
Dept: OBGYN CLINIC | Facility: CLINIC | Age: 55
End: 2020-12-02
Payer: COMMERCIAL

## 2020-12-02 ENCOUNTER — APPOINTMENT (OUTPATIENT)
Dept: RADIOLOGY | Facility: CLINIC | Age: 55
End: 2020-12-02
Payer: COMMERCIAL

## 2020-12-02 VITALS
BODY MASS INDEX: 38.35 KG/M2 | HEART RATE: 82 BPM | SYSTOLIC BLOOD PRESSURE: 160 MMHG | DIASTOLIC BLOOD PRESSURE: 100 MMHG | WEIGHT: 275 LBS

## 2020-12-02 DIAGNOSIS — T84.038D LOOSENING OF PROSTHETIC HIP, SUBSEQUENT ENCOUNTER: ICD-10-CM

## 2020-12-02 DIAGNOSIS — Z96.649 LOOSENING OF PROSTHETIC HIP, SUBSEQUENT ENCOUNTER: ICD-10-CM

## 2020-12-02 DIAGNOSIS — Z96.641 HISTORY OF TOTAL RIGHT HIP ARTHROPLASTY: ICD-10-CM

## 2020-12-02 DIAGNOSIS — M25.551 RIGHT HIP PAIN: ICD-10-CM

## 2020-12-02 DIAGNOSIS — T84.038D LOOSENING OF PROSTHETIC HIP, SUBSEQUENT ENCOUNTER: Primary | ICD-10-CM

## 2020-12-02 DIAGNOSIS — Z96.649 LOOSENING OF PROSTHETIC HIP, SUBSEQUENT ENCOUNTER: Primary | ICD-10-CM

## 2020-12-02 DIAGNOSIS — Z01.818 PRE-OP EXAM: ICD-10-CM

## 2020-12-02 PROCEDURE — 73502 X-RAY EXAM HIP UNI 2-3 VIEWS: CPT

## 2020-12-02 PROCEDURE — 99215 OFFICE O/P EST HI 40 MIN: CPT | Performed by: ORTHOPAEDIC SURGERY

## 2020-12-02 RX ORDER — ZINC SULFATE 50(220)MG
220 CAPSULE ORAL DAILY
Qty: 30 CAPSULE | Refills: 0 | Status: SHIPPED | OUTPATIENT
Start: 2020-12-02 | End: 2021-01-05 | Stop reason: HOSPADM

## 2020-12-02 RX ORDER — FERROUS SULFATE TAB EC 324 MG (65 MG FE EQUIVALENT) 324 (65 FE) MG
324 TABLET DELAYED RESPONSE ORAL
Qty: 30 TABLET | Refills: 0 | Status: SHIPPED | OUTPATIENT
Start: 2020-12-02 | End: 2021-01-05 | Stop reason: HOSPADM

## 2020-12-02 RX ORDER — FOLIC ACID 1 MG/1
1 TABLET ORAL DAILY
Qty: 30 TABLET | Refills: 0 | Status: SHIPPED | OUTPATIENT
Start: 2020-12-02 | End: 2021-01-05 | Stop reason: HOSPADM

## 2020-12-02 RX ORDER — ASCORBIC ACID 500 MG
500 TABLET ORAL 2 TIMES DAILY
Qty: 60 TABLET | Refills: 0 | Status: SHIPPED | OUTPATIENT
Start: 2020-12-02 | End: 2021-01-05 | Stop reason: HOSPADM

## 2020-12-02 RX ORDER — MELATONIN
1000 DAILY
Qty: 30 TABLET | Refills: 0 | Status: SHIPPED | OUTPATIENT
Start: 2020-12-02 | End: 2021-01-05 | Stop reason: HOSPADM

## 2020-12-08 ENCOUNTER — TELEPHONE (OUTPATIENT)
Dept: OBGYN CLINIC | Facility: HOSPITAL | Age: 55
End: 2020-12-08

## 2020-12-08 ENCOUNTER — LAB (OUTPATIENT)
Dept: LAB | Facility: HOSPITAL | Age: 55
End: 2020-12-08
Attending: ORTHOPAEDIC SURGERY
Payer: COMMERCIAL

## 2020-12-08 ENCOUNTER — HOSPITAL ENCOUNTER (OUTPATIENT)
Dept: RADIOLOGY | Facility: HOSPITAL | Age: 55
Discharge: HOME | End: 2020-12-08
Attending: ORTHOPAEDIC SURGERY
Payer: COMMERCIAL

## 2020-12-08 ENCOUNTER — TRANSCRIBE ORDERS (OUTPATIENT)
Dept: ADMINISTRATIVE | Facility: HOSPITAL | Age: 55
End: 2020-12-08

## 2020-12-08 DIAGNOSIS — Z96.641 HISTORY OF TOTAL RIGHT HIP ARTHROPLASTY: ICD-10-CM

## 2020-12-08 DIAGNOSIS — T84.038D LOOSENING OF PROSTHETIC HIP, SUBSEQUENT ENCOUNTER: ICD-10-CM

## 2020-12-08 DIAGNOSIS — M25.551 RIGHT HIP PAIN: ICD-10-CM

## 2020-12-08 DIAGNOSIS — Z96.649 LOOSENING OF PROSTHETIC HIP, SUBSEQUENT ENCOUNTER: ICD-10-CM

## 2020-12-08 DIAGNOSIS — Z96.641 HISTORY OF TOTAL RIGHT HIP ARTHROPLASTY: Primary | ICD-10-CM

## 2020-12-08 LAB
ABO GROUP BLD: NORMAL
ALBUMIN SERPL BCP-MCNC: 3.6 G/DL (ref 3.5–5)
ALP SERPL-CCNC: 77 U/L (ref 46–116)
ALT SERPL W P-5'-P-CCNC: 65 U/L (ref 12–78)
ANION GAP SERPL CALCULATED.3IONS-SCNC: 11 MMOL/L (ref 4–13)
APTT PPP: 31 SECONDS (ref 23–37)
AST SERPL W P-5'-P-CCNC: 29 U/L (ref 5–45)
BASOPHILS # BLD AUTO: 0.04 THOUSANDS/ΜL (ref 0–0.1)
BASOPHILS NFR BLD AUTO: 1 % (ref 0–1)
BILIRUB SERPL-MCNC: 0.4 MG/DL (ref 0.2–1)
BILIRUB UR QL STRIP: NEGATIVE
BLD GP AB SCN SERPL QL: NEGATIVE
BUN SERPL-MCNC: 18 MG/DL (ref 5–25)
CALCIUM SERPL-MCNC: 9.3 MG/DL (ref 8.3–10.1)
CHLORIDE SERPL-SCNC: 101 MMOL/L (ref 100–108)
CLARITY UR: CLEAR
CO2 SERPL-SCNC: 27 MMOL/L (ref 21–32)
COLOR UR: YELLOW
CREAT SERPL-MCNC: 1.14 MG/DL (ref 0.6–1.3)
EOSINOPHIL # BLD AUTO: 0.23 THOUSAND/ΜL (ref 0–0.61)
EOSINOPHIL NFR BLD AUTO: 3 % (ref 0–6)
ERYTHROCYTE [DISTWIDTH] IN BLOOD BY AUTOMATED COUNT: 11.9 % (ref 11.6–15.1)
EST. AVERAGE GLUCOSE BLD GHB EST-MCNC: 140 MG/DL
GFR SERPL CREATININE-BSD FRML MDRD: 73 ML/MIN/1.73SQ M
GLUCOSE P FAST SERPL-MCNC: 126 MG/DL (ref 65–99)
GLUCOSE UR STRIP-MCNC: NEGATIVE MG/DL
HBA1C MFR BLD: 6.5 %
HCT VFR BLD AUTO: 46 % (ref 36.5–49.3)
HGB BLD-MCNC: 15.4 G/DL (ref 12–17)
HGB UR QL STRIP.AUTO: NEGATIVE
IMM GRANULOCYTES # BLD AUTO: 0.12 THOUSAND/UL (ref 0–0.2)
IMM GRANULOCYTES NFR BLD AUTO: 1 % (ref 0–2)
INR PPP: 0.87 (ref 0.84–1.19)
KETONES UR STRIP-MCNC: NEGATIVE MG/DL
LEUKOCYTE ESTERASE UR QL STRIP: NEGATIVE
LYMPHOCYTES # BLD AUTO: 1.46 THOUSANDS/ΜL (ref 0.6–4.47)
LYMPHOCYTES NFR BLD AUTO: 18 % (ref 14–44)
MCH RBC QN AUTO: 30.9 PG (ref 26.8–34.3)
MCHC RBC AUTO-ENTMCNC: 33.5 G/DL (ref 31.4–37.4)
MCV RBC AUTO: 92 FL (ref 82–98)
MONOCYTES # BLD AUTO: 0.8 THOUSAND/ΜL (ref 0.17–1.22)
MONOCYTES NFR BLD AUTO: 10 % (ref 4–12)
NEUTROPHILS # BLD AUTO: 5.65 THOUSANDS/ΜL (ref 1.85–7.62)
NEUTS SEG NFR BLD AUTO: 67 % (ref 43–75)
NITRITE UR QL STRIP: NEGATIVE
NRBC BLD AUTO-RTO: 0 /100 WBCS
PH UR STRIP.AUTO: 6 [PH]
PLATELET # BLD AUTO: 374 THOUSANDS/UL (ref 149–390)
PMV BLD AUTO: 9.3 FL (ref 8.9–12.7)
POTASSIUM SERPL-SCNC: 4 MMOL/L (ref 3.5–5.3)
PROT SERPL-MCNC: 7.3 G/DL (ref 6.4–8.2)
PROT UR STRIP-MCNC: NEGATIVE MG/DL
PROTHROMBIN TIME: 11.8 SECONDS (ref 11.6–14.5)
RBC # BLD AUTO: 4.98 MILLION/UL (ref 3.88–5.62)
RH BLD: POSITIVE
SODIUM SERPL-SCNC: 139 MMOL/L (ref 136–145)
SP GR UR STRIP.AUTO: 1.02 (ref 1–1.03)
SPECIMEN EXPIRATION DATE: NORMAL
UROBILINOGEN UR QL STRIP.AUTO: 0.2 E.U./DL
WBC # BLD AUTO: 8.3 THOUSAND/UL (ref 4.31–10.16)

## 2020-12-08 PROCEDURE — 86901 BLOOD TYPING SEROLOGIC RH(D): CPT

## 2020-12-08 PROCEDURE — 36415 COLL VENOUS BLD VENIPUNCTURE: CPT | Performed by: ORTHOPAEDIC SURGERY

## 2020-12-08 PROCEDURE — 71046 X-RAY EXAM CHEST 2 VIEWS: CPT

## 2020-12-08 PROCEDURE — 85730 THROMBOPLASTIN TIME PARTIAL: CPT

## 2020-12-08 PROCEDURE — 85610 PROTHROMBIN TIME: CPT

## 2020-12-08 PROCEDURE — 86900 BLOOD TYPING SEROLOGIC ABO: CPT

## 2020-12-08 PROCEDURE — 83036 HEMOGLOBIN GLYCOSYLATED A1C: CPT | Performed by: ORTHOPAEDIC SURGERY

## 2020-12-08 PROCEDURE — 81003 URINALYSIS AUTO W/O SCOPE: CPT | Performed by: ORTHOPAEDIC SURGERY

## 2020-12-08 PROCEDURE — 85025 COMPLETE CBC W/AUTO DIFF WBC: CPT

## 2020-12-08 PROCEDURE — 87081 CULTURE SCREEN ONLY: CPT

## 2020-12-08 PROCEDURE — 86850 RBC ANTIBODY SCREEN: CPT

## 2020-12-08 PROCEDURE — 80053 COMPREHEN METABOLIC PANEL: CPT

## 2020-12-09 LAB — MRSA NOSE QL CULT: NORMAL

## 2020-12-10 ENCOUNTER — TELEPHONE (OUTPATIENT)
Dept: OBGYN CLINIC | Facility: HOSPITAL | Age: 55
End: 2020-12-10

## 2020-12-11 ENCOUNTER — CONSULT (OUTPATIENT)
Dept: CARDIOLOGY CLINIC | Facility: CLINIC | Age: 55
End: 2020-12-11
Payer: COMMERCIAL

## 2020-12-11 VITALS
BODY MASS INDEX: 38.78 KG/M2 | DIASTOLIC BLOOD PRESSURE: 70 MMHG | HEART RATE: 87 BPM | TEMPERATURE: 98.4 F | SYSTOLIC BLOOD PRESSURE: 120 MMHG | HEIGHT: 71 IN | OXYGEN SATURATION: 95 % | WEIGHT: 277 LBS

## 2020-12-11 DIAGNOSIS — Z96.641 HISTORY OF TOTAL RIGHT HIP ARTHROPLASTY: ICD-10-CM

## 2020-12-11 DIAGNOSIS — M25.551 RIGHT HIP PAIN: ICD-10-CM

## 2020-12-11 DIAGNOSIS — Z96.649 LOOSENING OF PROSTHETIC HIP, SUBSEQUENT ENCOUNTER: ICD-10-CM

## 2020-12-11 DIAGNOSIS — Z01.818 PRE-OP EXAM: ICD-10-CM

## 2020-12-11 DIAGNOSIS — T84.038D LOOSENING OF PROSTHETIC HIP, SUBSEQUENT ENCOUNTER: ICD-10-CM

## 2020-12-11 PROCEDURE — 99243 OFF/OP CNSLTJ NEW/EST LOW 30: CPT | Performed by: INTERNAL MEDICINE

## 2020-12-11 PROCEDURE — 93000 ELECTROCARDIOGRAM COMPLETE: CPT | Performed by: INTERNAL MEDICINE

## 2020-12-22 NOTE — PRE-PROCEDURE INSTRUCTIONS
My Surgical Experience    The following information was developed to assist you to prepare for your operation  What do I need to do before coming to the hospital?   Arrange for a responsible person to drive you to and from the hospital    Arrange care for your children at home  Children are not allowed in the recovery areas of the hospital   Plan to wear clothing that is easy to put on and take off  If you are having shoulder surgery, wear a shirt that buttons or zippers in the front  Bathing  o Shower the evening before and the morning of your surgery with an antibacterial soap  Please refer to the Pre Op Showering Instructions for Surgery Patients Sheet   o Remove nail polish and all body piercing jewelry  o Do not shave any body part for at least 24 hours before surgery-this includes face, arms, legs and upper body  Food  o Nothing to eat or drink after midnight the night before your surgery  This includes candy and chewing gum  o Exception: If your surgery is after 12:00pm (noon), you may have clear liquids such as 7-Up®, ginger ale, apple or cranberry juice, Jell-O®, water, or clear broth until 8:00 am  o Do not drink milk or juice with pulp on the morning before surgery  o Do not drink alcohol 24 hours before surgery  Medicine  o Follow instructions you received from your surgeon about which medicines you may take on the day of surgery  o If instructed to take medicine on the morning of surgery, take pills with just a small sip of water  Call your prescribing doctor for specific infroamtion on what to do if you take insulin    What should I bring to the hospital?    Bring:  Jodie Stephenson or a walker, if you have them, for foot or knee surgery   A list of the daily medicines, vitamins, minerals, herbals and nutritional supplements you take   Include the dosages of medicines and the time you take them each day   Glasses, dentures or hearing aids   Minimal clothing; you will be wearing hospital sleepwear   Photo ID; required to verify your identity   If you have a Living Will or Power of , bring a copy of the documents   If you have an ostomy, bring an extra pouch and any supplies you use    Do not bring   Medicines or inhalers   Money, valuables or jewelry    What other information should I know about the day of surgery?  Notify your surgeons if you develop a cold, sore throat, cough, fever, rash or any other illness   Report to the Ambulatory Surgical/Same Day Surgery Unit   You will be instructed to stop at Registration only if you have not been pre-registered   Inform your  fi they do not stay that they will be asked by the staff to leave a phone number where they can be reached   Be available to be reached before surgery  In the event the operating room schedule changes, you may be asked to come in earlier or later than expected    *It is important to tell your doctor and others involved in your health care if you are taking or have been taking any non-prescription drugs, vitamins, minerals, herbals or other nutritional supplements  Any of these may interact with some food or medicines and cause a reaction      Pre-Surgery Instructions:   Medication Instructions    ascorbic acid (VITAMIN C) 500 MG tablet Instructed patient per Anesthesia Guidelines   b complex vitamins capsule Instructed patient per Anesthesia Guidelines   cholecalciferol (VITAMIN D3) 1,000 units tablet Instructed patient per Anesthesia Guidelines   Coenzyme Q10 (COQ-10) 100 MG CAPS Instructed patient per Anesthesia Guidelines   ferrous sulfate 324 (65 Fe) mg Instructed patient per Anesthesia Guidelines   folic acid (FOLVITE) 1 mg tablet Instructed patient per Anesthesia Guidelines   hydrochlorothiazide (MICROZIDE) 12 5 mg capsule Instructed patient per Anesthesia Guidelines   losartan (COZAAR) 100 MG tablet Instructed patient per Anesthesia Guidelines      Melatonin 1 MG CAPS Instructed patient per Anesthesia Guidelines   metFORMIN (GLUCOPHAGE) 500 mg tablet Instructed patient per Anesthesia Guidelines   pantoprazole (PROTONIX) 40 mg tablet Instructed patient per Anesthesia Guidelines   simvastatin (ZOCOR) 40 mg tablet Instructed patient per Anesthesia Guidelines   zinc sulfate (ZINCATE) 220 mg capsule Instructed patient per Anesthesia Guidelines      To take losartan and pantoprazole a m  of surgery

## 2020-12-28 ENCOUNTER — EVALUATION (OUTPATIENT)
Dept: PHYSICAL THERAPY | Facility: CLINIC | Age: 55
End: 2020-12-28
Payer: COMMERCIAL

## 2020-12-28 DIAGNOSIS — Z96.649 LOOSENING OF PROSTHETIC HIP, SUBSEQUENT ENCOUNTER: ICD-10-CM

## 2020-12-28 DIAGNOSIS — M25.551 RIGHT HIP PAIN: ICD-10-CM

## 2020-12-28 DIAGNOSIS — Z01.818 PRE-OP EXAM: ICD-10-CM

## 2020-12-28 DIAGNOSIS — Z96.641 HISTORY OF TOTAL RIGHT HIP ARTHROPLASTY: ICD-10-CM

## 2020-12-28 DIAGNOSIS — T84.038D LOOSENING OF PROSTHETIC HIP, SUBSEQUENT ENCOUNTER: ICD-10-CM

## 2020-12-28 PROCEDURE — 97116 GAIT TRAINING THERAPY: CPT | Performed by: PHYSICAL THERAPIST

## 2020-12-28 PROCEDURE — 97161 PT EVAL LOW COMPLEX 20 MIN: CPT | Performed by: PHYSICAL THERAPIST

## 2020-12-29 DIAGNOSIS — Z96.641 HISTORY OF TOTAL RIGHT HIP ARTHROPLASTY: ICD-10-CM

## 2020-12-29 DIAGNOSIS — T84.038D LOOSENING OF PROSTHETIC HIP, SUBSEQUENT ENCOUNTER: ICD-10-CM

## 2020-12-29 DIAGNOSIS — Z96.649 LOOSENING OF PROSTHETIC HIP, SUBSEQUENT ENCOUNTER: ICD-10-CM

## 2020-12-29 DIAGNOSIS — M25.551 RIGHT HIP PAIN: ICD-10-CM

## 2020-12-29 PROCEDURE — U0003 INFECTIOUS AGENT DETECTION BY NUCLEIC ACID (DNA OR RNA); SEVERE ACUTE RESPIRATORY SYNDROME CORONAVIRUS 2 (SARS-COV-2) (CORONAVIRUS DISEASE [COVID-19]), AMPLIFIED PROBE TECHNIQUE, MAKING USE OF HIGH THROUGHPUT TECHNOLOGIES AS DESCRIBED BY CMS-2020-01-R: HCPCS | Performed by: ORTHOPAEDIC SURGERY

## 2020-12-30 LAB — SARS-COV-2 RNA SPEC QL NAA+PROBE: NOT DETECTED

## 2021-01-03 ENCOUNTER — ANESTHESIA EVENT (OUTPATIENT)
Dept: PERIOP | Facility: HOSPITAL | Age: 56
DRG: 467 | End: 2021-01-03
Payer: COMMERCIAL

## 2021-01-03 NOTE — ANESTHESIA PREPROCEDURE EVALUATION
Procedure:  ARTHROPLASTY HIP TOTAL REVISION - RIGHT (Right Hip)    Relevant Problems   CARDIO   (+) Essential hypertension   (+) Hyperlipidemia      MUSCULOSKELETAL   (+) Primary osteoarthritis of right hip        Physical Exam    Airway    Mallampati score: III  TM Distance: >3 FB  Neck ROM: limited     Dental   No notable dental hx     Cardiovascular  Rhythm: regular, Rate: normal,     Pulmonary  Breath sounds clear to auscultation, Decreased breath sounds,     Other Findings        Anesthesia Plan  ASA Score- 3     Anesthesia Type- IV sedation with anesthesia, regional and spinal with ASA Monitors  Additional Monitors:   Airway Plan: ETT  Plan Factors-Exercise tolerance (METS): >4 METS  Chart reviewed  EKG reviewed  Existing labs reviewed  Patient summary reviewed  Patient is not a current smoker  Induction- intravenous  Postoperative Plan- Plan for postoperative opioid use  Informed Consent- Anesthetic plan and risks discussed with patient  I personally reviewed this patient with the CRNA  Discussed and agreed on the Anesthesia Plan with the CRNA  Carl Morton

## 2021-01-04 ENCOUNTER — ANESTHESIA (OUTPATIENT)
Dept: PERIOP | Facility: HOSPITAL | Age: 56
DRG: 467 | End: 2021-01-04
Payer: COMMERCIAL

## 2021-01-04 ENCOUNTER — APPOINTMENT (INPATIENT)
Dept: RADIOLOGY | Facility: HOSPITAL | Age: 56
DRG: 467 | End: 2021-01-04
Payer: COMMERCIAL

## 2021-01-04 ENCOUNTER — HOSPITAL ENCOUNTER (INPATIENT)
Facility: HOSPITAL | Age: 56
LOS: 1 days | Discharge: HOME | DRG: 467 | End: 2021-01-05
Attending: ORTHOPAEDIC SURGERY | Admitting: ORTHOPAEDIC SURGERY
Payer: COMMERCIAL

## 2021-01-04 VITALS — HEART RATE: 96 BPM

## 2021-01-04 DIAGNOSIS — Z96.649 S/P REVISION OF TOTAL HIP: Primary | ICD-10-CM

## 2021-01-04 DIAGNOSIS — T84.038D LOOSENING OF PROSTHETIC HIP, SUBSEQUENT ENCOUNTER: ICD-10-CM

## 2021-01-04 DIAGNOSIS — Z96.649 LOOSENING OF PROSTHETIC HIP, SUBSEQUENT ENCOUNTER: ICD-10-CM

## 2021-01-04 DIAGNOSIS — Z96.641 HISTORY OF TOTAL RIGHT HIP ARTHROPLASTY: ICD-10-CM

## 2021-01-04 PROBLEM — R00.0 TACHYCARDIA: Status: ACTIVE | Noted: 2021-01-04

## 2021-01-04 PROBLEM — E11.9 TYPE 2 DIABETES MELLITUS WITHOUT COMPLICATION, WITHOUT LONG-TERM CURRENT USE OF INSULIN (HCC): Status: ACTIVE | Noted: 2021-01-04

## 2021-01-04 LAB
HBV SURFACE AG SER QL: NORMAL
HCV AB SER QL: NORMAL
HIV 1+2 AB+HIV1 P24 AG SERPL QL IA: NORMAL
HIV1 P24 AG SER QL: NORMAL

## 2021-01-04 PROCEDURE — 0SPR0JZ REMOVAL OF SYNTHETIC SUBSTITUTE FROM RIGHT HIP JOINT, FEMORAL SURFACE, OPEN APPROACH: ICD-10-PCS | Performed by: ORTHOPAEDIC SURGERY

## 2021-01-04 PROCEDURE — C1776 JOINT DEVICE (IMPLANTABLE): HCPCS | Performed by: ORTHOPAEDIC SURGERY

## 2021-01-04 PROCEDURE — 88304 TISSUE EXAM BY PATHOLOGIST: CPT | Performed by: PATHOLOGY

## 2021-01-04 PROCEDURE — 87806 HIV AG W/HIV1&2 ANTB W/OPTIC: CPT | Performed by: PHYSICIAN ASSISTANT

## 2021-01-04 PROCEDURE — 27138 REVISE HIP JOINT REPLACEMENT: CPT | Performed by: PHYSICIAN ASSISTANT

## 2021-01-04 PROCEDURE — 87340 HEPATITIS B SURFACE AG IA: CPT | Performed by: PHYSICIAN ASSISTANT

## 2021-01-04 PROCEDURE — 73501 X-RAY EXAM HIP UNI 1 VIEW: CPT

## 2021-01-04 PROCEDURE — 93005 ELECTROCARDIOGRAM TRACING: CPT

## 2021-01-04 PROCEDURE — 88311 DECALCIFY TISSUE: CPT | Performed by: PATHOLOGY

## 2021-01-04 PROCEDURE — 99024 POSTOP FOLLOW-UP VISIT: CPT | Performed by: ORTHOPAEDIC SURGERY

## 2021-01-04 PROCEDURE — 86803 HEPATITIS C AB TEST: CPT | Performed by: PHYSICIAN ASSISTANT

## 2021-01-04 PROCEDURE — 27138 REVISE HIP JOINT REPLACEMENT: CPT | Performed by: ORTHOPAEDIC SURGERY

## 2021-01-04 PROCEDURE — 97110 THERAPEUTIC EXERCISES: CPT

## 2021-01-04 PROCEDURE — 97163 PT EVAL HIGH COMPLEX 45 MIN: CPT

## 2021-01-04 PROCEDURE — 0SRR0JZ REPLACEMENT OF RIGHT HIP JOINT, FEMORAL SURFACE WITH SYNTHETIC SUBSTITUTE, OPEN APPROACH: ICD-10-PCS | Performed by: ORTHOPAEDIC SURGERY

## 2021-01-04 DEVICE — BIOLOX DELTA CERAMIC FEMORAL HEAD +5.0 36MM DIA 12/14 TAPER
Type: IMPLANTABLE DEVICE | Site: HIP | Status: FUNCTIONAL
Brand: BIOLOX DELTA

## 2021-01-04 DEVICE — RECLAIM REVISION HIP SYSTEM CONICAL PROXIMAL BODY CEMENTLESS TENSILE BAR 24MM X 85MM 85MM LOCKING BOLT
Type: IMPLANTABLE DEVICE | Status: FUNCTIONAL
Brand: RECLAIM

## 2021-01-04 DEVICE — RECLAIM REVISION HIP SYSTEM DISTAL TAPERED STEM 16MM X 140MM STRAIGHT
Type: IMPLANTABLE DEVICE | Status: FUNCTIONAL
Brand: RECLAIM

## 2021-01-04 RX ORDER — PROPOFOL 10 MG/ML
INJECTION, EMULSION INTRAVENOUS AS NEEDED
Status: DISCONTINUED | OUTPATIENT
Start: 2021-01-04 | End: 2021-01-04

## 2021-01-04 RX ORDER — LANOLIN ALCOHOL/MO/W.PET/CERES
3 CREAM (GRAM) TOPICAL
Status: DISCONTINUED | OUTPATIENT
Start: 2021-01-04 | End: 2021-01-05 | Stop reason: HOSPADM

## 2021-01-04 RX ORDER — ROCURONIUM BROMIDE 10 MG/ML
INJECTION, SOLUTION INTRAVENOUS AS NEEDED
Status: DISCONTINUED | OUTPATIENT
Start: 2021-01-04 | End: 2021-01-04

## 2021-01-04 RX ORDER — DOCUSATE SODIUM 100 MG/1
100 CAPSULE, LIQUID FILLED ORAL 2 TIMES DAILY
Status: DISCONTINUED | OUTPATIENT
Start: 2021-01-04 | End: 2021-01-05 | Stop reason: HOSPADM

## 2021-01-04 RX ORDER — DEXAMETHASONE SODIUM PHOSPHATE 4 MG/ML
10 INJECTION, SOLUTION INTRA-ARTICULAR; INTRALESIONAL; INTRAMUSCULAR; INTRAVENOUS; SOFT TISSUE ONCE
Status: DISCONTINUED | OUTPATIENT
Start: 2021-01-05 | End: 2021-01-05 | Stop reason: HOSPADM

## 2021-01-04 RX ORDER — GABAPENTIN 100 MG/1
200 CAPSULE ORAL 2 TIMES DAILY
Status: DISCONTINUED | OUTPATIENT
Start: 2021-01-04 | End: 2021-01-05 | Stop reason: HOSPADM

## 2021-01-04 RX ORDER — HYDROMORPHONE HCL/PF 1 MG/ML
0.5 SYRINGE (ML) INJECTION EVERY 2 HOUR PRN
Status: DISCONTINUED | OUTPATIENT
Start: 2021-01-04 | End: 2021-01-05 | Stop reason: HOSPADM

## 2021-01-04 RX ORDER — DIPHENHYDRAMINE HYDROCHLORIDE 50 MG/ML
INJECTION INTRAMUSCULAR; INTRAVENOUS AS NEEDED
Status: DISCONTINUED | OUTPATIENT
Start: 2021-01-04 | End: 2021-01-04

## 2021-01-04 RX ORDER — ONDANSETRON 2 MG/ML
INJECTION INTRAMUSCULAR; INTRAVENOUS AS NEEDED
Status: DISCONTINUED | OUTPATIENT
Start: 2021-01-04 | End: 2021-01-04

## 2021-01-04 RX ORDER — GABAPENTIN 300 MG/1
300 CAPSULE ORAL ONCE
Status: COMPLETED | OUTPATIENT
Start: 2021-01-04 | End: 2021-01-04

## 2021-01-04 RX ORDER — NEOSTIGMINE METHYLSULFATE 1 MG/ML
INJECTION INTRAVENOUS AS NEEDED
Status: DISCONTINUED | OUTPATIENT
Start: 2021-01-04 | End: 2021-01-04

## 2021-01-04 RX ORDER — SODIUM CHLORIDE 9 MG/ML
125 INJECTION, SOLUTION INTRAVENOUS CONTINUOUS
Status: DISCONTINUED | OUTPATIENT
Start: 2021-01-04 | End: 2021-01-05 | Stop reason: HOSPADM

## 2021-01-04 RX ORDER — LIDOCAINE HYDROCHLORIDE 20 MG/ML
INJECTION, SOLUTION EPIDURAL; INFILTRATION; INTRACAUDAL; PERINEURAL AS NEEDED
Status: DISCONTINUED | OUTPATIENT
Start: 2021-01-04 | End: 2021-01-04

## 2021-01-04 RX ORDER — ASPIRIN 325 MG
325 TABLET ORAL 2 TIMES DAILY
Status: DISCONTINUED | OUTPATIENT
Start: 2021-01-04 | End: 2021-01-05 | Stop reason: HOSPADM

## 2021-01-04 RX ORDER — PANTOPRAZOLE SODIUM 40 MG/1
40 TABLET, DELAYED RELEASE ORAL DAILY
Status: DISCONTINUED | OUTPATIENT
Start: 2021-01-05 | End: 2021-01-05 | Stop reason: HOSPADM

## 2021-01-04 RX ORDER — OXYCODONE HYDROCHLORIDE 10 MG/1
10 TABLET ORAL EVERY 4 HOURS PRN
Status: DISCONTINUED | OUTPATIENT
Start: 2021-01-04 | End: 2021-01-05 | Stop reason: HOSPADM

## 2021-01-04 RX ORDER — ACETAMINOPHEN 325 MG/1
975 TABLET ORAL ONCE
Status: COMPLETED | OUTPATIENT
Start: 2021-01-04 | End: 2021-01-04

## 2021-01-04 RX ORDER — CEFAZOLIN SODIUM 1 G/3ML
INJECTION, POWDER, FOR SOLUTION INTRAMUSCULAR; INTRAVENOUS AS NEEDED
Status: DISCONTINUED | OUTPATIENT
Start: 2021-01-04 | End: 2021-01-04

## 2021-01-04 RX ORDER — MAGNESIUM HYDROXIDE/ALUMINUM HYDROXICE/SIMETHICONE 120; 1200; 1200 MG/30ML; MG/30ML; MG/30ML
30 SUSPENSION ORAL EVERY 6 HOURS PRN
Status: DISCONTINUED | OUTPATIENT
Start: 2021-01-04 | End: 2021-01-05 | Stop reason: HOSPADM

## 2021-01-04 RX ORDER — SCOLOPAMINE TRANSDERMAL SYSTEM 1 MG/1
1 PATCH, EXTENDED RELEASE TRANSDERMAL ONCE
Status: DISCONTINUED | OUTPATIENT
Start: 2021-01-04 | End: 2021-01-05 | Stop reason: HOSPADM

## 2021-01-04 RX ORDER — BUPIVACAINE HYDROCHLORIDE 5 MG/ML
INJECTION, SOLUTION PERINEURAL AS NEEDED
Status: DISCONTINUED | OUTPATIENT
Start: 2021-01-04 | End: 2021-01-04 | Stop reason: HOSPADM

## 2021-01-04 RX ORDER — ONDANSETRON 2 MG/ML
4 INJECTION INTRAMUSCULAR; INTRAVENOUS EVERY 6 HOURS PRN
Status: DISCONTINUED | OUTPATIENT
Start: 2021-01-04 | End: 2021-01-05 | Stop reason: HOSPADM

## 2021-01-04 RX ORDER — GLYCOPYRROLATE 0.2 MG/ML
INJECTION INTRAMUSCULAR; INTRAVENOUS AS NEEDED
Status: DISCONTINUED | OUTPATIENT
Start: 2021-01-04 | End: 2021-01-04

## 2021-01-04 RX ORDER — PRAVASTATIN SODIUM 80 MG/1
80 TABLET ORAL
Status: DISCONTINUED | OUTPATIENT
Start: 2021-01-04 | End: 2021-01-05 | Stop reason: HOSPADM

## 2021-01-04 RX ORDER — SODIUM CHLORIDE, SODIUM LACTATE, POTASSIUM CHLORIDE, CALCIUM CHLORIDE 600; 310; 30; 20 MG/100ML; MG/100ML; MG/100ML; MG/100ML
125 INJECTION, SOLUTION INTRAVENOUS CONTINUOUS
Status: DISCONTINUED | OUTPATIENT
Start: 2021-01-04 | End: 2021-01-04

## 2021-01-04 RX ORDER — FENTANYL CITRATE/PF 50 MCG/ML
25 SYRINGE (ML) INJECTION
Status: DISCONTINUED | OUTPATIENT
Start: 2021-01-04 | End: 2021-01-04 | Stop reason: HOSPADM

## 2021-01-04 RX ORDER — OXYCODONE HCL 10 MG/1
10 TABLET, FILM COATED, EXTENDED RELEASE ORAL ONCE
Status: COMPLETED | OUTPATIENT
Start: 2021-01-04 | End: 2021-01-04

## 2021-01-04 RX ORDER — ACETAMINOPHEN 325 MG/1
975 TABLET ORAL EVERY 8 HOURS
Status: DISCONTINUED | OUTPATIENT
Start: 2021-01-04 | End: 2021-01-05 | Stop reason: HOSPADM

## 2021-01-04 RX ORDER — HYDROMORPHONE HCL/PF 1 MG/ML
SYRINGE (ML) INJECTION AS NEEDED
Status: DISCONTINUED | OUTPATIENT
Start: 2021-01-04 | End: 2021-01-04

## 2021-01-04 RX ORDER — DEXAMETHASONE SODIUM PHOSPHATE 4 MG/ML
INJECTION, SOLUTION INTRA-ARTICULAR; INTRALESIONAL; INTRAMUSCULAR; INTRAVENOUS; SOFT TISSUE AS NEEDED
Status: DISCONTINUED | OUTPATIENT
Start: 2021-01-04 | End: 2021-01-04

## 2021-01-04 RX ORDER — FENTANYL CITRATE 50 UG/ML
INJECTION, SOLUTION INTRAMUSCULAR; INTRAVENOUS AS NEEDED
Status: DISCONTINUED | OUTPATIENT
Start: 2021-01-04 | End: 2021-01-04

## 2021-01-04 RX ORDER — CELECOXIB 100 MG/1
100 CAPSULE ORAL 2 TIMES DAILY
Status: DISCONTINUED | OUTPATIENT
Start: 2021-01-04 | End: 2021-01-05

## 2021-01-04 RX ORDER — OXYCODONE HYDROCHLORIDE 5 MG/1
5 TABLET ORAL EVERY 4 HOURS PRN
Status: DISCONTINUED | OUTPATIENT
Start: 2021-01-04 | End: 2021-01-05 | Stop reason: HOSPADM

## 2021-01-04 RX ORDER — MIDAZOLAM HYDROCHLORIDE 2 MG/2ML
INJECTION, SOLUTION INTRAMUSCULAR; INTRAVENOUS AS NEEDED
Status: DISCONTINUED | OUTPATIENT
Start: 2021-01-04 | End: 2021-01-04

## 2021-01-04 RX ADMIN — ROCURONIUM BROMIDE 50 MG: 10 INJECTION, SOLUTION INTRAVENOUS at 10:17

## 2021-01-04 RX ADMIN — OXYCODONE HYDROCHLORIDE 10 MG: 10 TABLET, FILM COATED, EXTENDED RELEASE ORAL at 08:01

## 2021-01-04 RX ADMIN — DOCUSATE SODIUM 100 MG: 100 CAPSULE, LIQUID FILLED ORAL at 17:16

## 2021-01-04 RX ADMIN — CEFAZOLIN SODIUM 3000 MG: 1 INJECTION, POWDER, FOR SOLUTION INTRAMUSCULAR; INTRAVENOUS at 20:48

## 2021-01-04 RX ADMIN — NEOSTIGMINE METHYLSULFATE 3 MG: 1 INJECTION INTRAVENOUS at 14:10

## 2021-01-04 RX ADMIN — GLYCOPYRROLATE 0.4 MG: 0.2 INJECTION, SOLUTION INTRAMUSCULAR; INTRAVENOUS at 14:10

## 2021-01-04 RX ADMIN — FENTANYL CITRATE 25 MCG: 50 INJECTION, SOLUTION INTRAMUSCULAR; INTRAVENOUS at 15:16

## 2021-01-04 RX ADMIN — DIPHENHYDRAMINE HYDROCHLORIDE 25 MG: 50 INJECTION INTRAMUSCULAR; INTRAVENOUS at 10:38

## 2021-01-04 RX ADMIN — ACETAMINOPHEN 975 MG: 325 TABLET, FILM COATED ORAL at 08:01

## 2021-01-04 RX ADMIN — FENTANYL CITRATE 50 MCG: 50 INJECTION, SOLUTION INTRAMUSCULAR; INTRAVENOUS at 10:16

## 2021-01-04 RX ADMIN — ASPIRIN 325 MG: 325 TABLET, FILM COATED ORAL at 22:23

## 2021-01-04 RX ADMIN — PROPOFOL 50 MG: 10 INJECTION, EMULSION INTRAVENOUS at 13:10

## 2021-01-04 RX ADMIN — HYDROMORPHONE HYDROCHLORIDE 0.3 MG: 1 INJECTION, SOLUTION INTRAMUSCULAR; INTRAVENOUS; SUBCUTANEOUS at 11:14

## 2021-01-04 RX ADMIN — ROCURONIUM BROMIDE 20 MG: 10 INJECTION, SOLUTION INTRAVENOUS at 12:34

## 2021-01-04 RX ADMIN — MIDAZOLAM 2 MG: 1 INJECTION INTRAMUSCULAR; INTRAVENOUS at 10:10

## 2021-01-04 RX ADMIN — LIDOCAINE HYDROCHLORIDE 100 MG: 20 INJECTION, SOLUTION EPIDURAL; INFILTRATION; INTRACAUDAL; PERINEURAL at 10:16

## 2021-01-04 RX ADMIN — FENTANYL CITRATE 25 MCG: 50 INJECTION, SOLUTION INTRAMUSCULAR; INTRAVENOUS at 12:35

## 2021-01-04 RX ADMIN — SODIUM CHLORIDE, SODIUM LACTATE, POTASSIUM CHLORIDE, AND CALCIUM CHLORIDE: .6; .31; .03; .02 INJECTION, SOLUTION INTRAVENOUS at 11:28

## 2021-01-04 RX ADMIN — SODIUM CHLORIDE, SODIUM LACTATE, POTASSIUM CHLORIDE, AND CALCIUM CHLORIDE 125 ML/HR: .6; .31; .03; .02 INJECTION, SOLUTION INTRAVENOUS at 08:05

## 2021-01-04 RX ADMIN — SODIUM CHLORIDE 75 ML/HR: 0.9 INJECTION, SOLUTION INTRAVENOUS at 17:15

## 2021-01-04 RX ADMIN — FENTANYL CITRATE 50 MCG: 50 INJECTION, SOLUTION INTRAMUSCULAR; INTRAVENOUS at 10:22

## 2021-01-04 RX ADMIN — SCOPALAMINE 1 PATCH: 1 PATCH, EXTENDED RELEASE TRANSDERMAL at 08:01

## 2021-01-04 RX ADMIN — GABAPENTIN 300 MG: 300 CAPSULE ORAL at 08:01

## 2021-01-04 RX ADMIN — PRAVASTATIN SODIUM 80 MG: 80 TABLET ORAL at 17:16

## 2021-01-04 RX ADMIN — MELATONIN TAB 3 MG 3 MG: 3 TAB at 22:23

## 2021-01-04 RX ADMIN — CEFAZOLIN 3000 MG: 1 INJECTION, POWDER, FOR SOLUTION INTRAVENOUS at 14:03

## 2021-01-04 RX ADMIN — OXYCODONE HYDROCHLORIDE 10 MG: 10 TABLET ORAL at 16:31

## 2021-01-04 RX ADMIN — PHENYLEPHRINE HYDROCHLORIDE 100 MCG: 10 INJECTION INTRAVENOUS at 11:07

## 2021-01-04 RX ADMIN — PROPOFOL 50 MG: 10 INJECTION, EMULSION INTRAVENOUS at 11:16

## 2021-01-04 RX ADMIN — CELECOXIB 100 MG: 100 CAPSULE ORAL at 17:16

## 2021-01-04 RX ADMIN — FENTANYL CITRATE 50 MCG: 50 INJECTION, SOLUTION INTRAMUSCULAR; INTRAVENOUS at 11:55

## 2021-01-04 RX ADMIN — FENTANYL CITRATE 25 MCG: 50 INJECTION, SOLUTION INTRAMUSCULAR; INTRAVENOUS at 12:27

## 2021-01-04 RX ADMIN — PROPOFOL 50 MG: 10 INJECTION, EMULSION INTRAVENOUS at 12:45

## 2021-01-04 RX ADMIN — HYDROMORPHONE HYDROCHLORIDE 0.7 MG: 1 INJECTION, SOLUTION INTRAMUSCULAR; INTRAVENOUS; SUBCUTANEOUS at 11:23

## 2021-01-04 RX ADMIN — SODIUM CHLORIDE, SODIUM LACTATE, POTASSIUM CHLORIDE, AND CALCIUM CHLORIDE: .6; .31; .03; .02 INJECTION, SOLUTION INTRAVENOUS at 14:20

## 2021-01-04 RX ADMIN — CEFAZOLIN 3000 MG: 1 INJECTION, POWDER, FOR SOLUTION INTRAVENOUS at 10:15

## 2021-01-04 RX ADMIN — GABAPENTIN 200 MG: 100 CAPSULE ORAL at 17:16

## 2021-01-04 RX ADMIN — TRANEXAMIC ACID 50 ML: 1 INJECTION, SOLUTION INTRAVENOUS at 10:20

## 2021-01-04 RX ADMIN — ACETAMINOPHEN 975 MG: 325 TABLET, FILM COATED ORAL at 17:15

## 2021-01-04 RX ADMIN — DEXAMETHASONE SODIUM PHOSPHATE 8 MG: 4 INJECTION, SOLUTION INTRA-ARTICULAR; INTRALESIONAL; INTRAMUSCULAR; INTRAVENOUS; SOFT TISSUE at 10:38

## 2021-01-04 RX ADMIN — ONDANSETRON 4 MG: 2 INJECTION INTRAMUSCULAR; INTRAVENOUS at 10:38

## 2021-01-04 RX ADMIN — PROPOFOL 200 MG: 10 INJECTION, EMULSION INTRAVENOUS at 10:16

## 2021-01-04 NOTE — PHYSICAL THERAPY NOTE
PT EVALUATION       01/04/21 1700   Note Type   Note type Evaluation   Pain Assessment   Pain Assessment Tool 0-10   Pain Score 6  (pt given pain meds prior to PT)   Pain Location/Orientation   (R hip)   Home Living   Type of [address] [place] [address] Two level;1/2 bath on main level;Bed/bath upstairs; Able to live on main level with bedroom/bathroom  (1 +1 CHAITANYA)   Bathroom Equipment   (has access to a commode and shower chair)   Home Equipment Walker;Cane   Prior Function   Level of [place] Independent with ADLs and functional mobility   Lives With Spouse; Family   ADL Assistance Independent   Vocational   ([employer])   Restrictions/Precautions   [place] Bearing Precautions Per Order Yes   RLE Weight Bearing Per Order WBAT   Braces or Orthoses   (abduction pillow)   Other Precautions Pain; Fall Risk  (posterior hip precautions)   General   Additional Pertinent History Pt is POD zero s/p R CELENA posterior approach   Cognition   Overall Cognitive Status WFL   RLE Assessment   RLE Assessment   (ROM WFL within precautions, MMT hip 2/5, knee 3/5, ankle 4+/5)   LLE Assessment   LLE Assessment WNL   Bed Mobility   Supine to Sit 3  Moderate assistance   Additional items LE management   Transfers   Sit to Stand 4  Minimal assistance   Stand to Sit 4  Minimal assistance   Stand pivot 4  Minimal assistance   Additional items   (with walker)   Ambulation/Elevation   Gait pattern   ([place])   Gait Assistance 4  Minimal assist   Assistive Device Rolling walker   Distance 100 feet   Balance   Static Sitting Good   Dynamic Sitting Fair +   Static Standing Fair +   Dynamic Standing Fair   Ambulatory Fair   Activity Tolerance   Activity Tolerance Patient tolerated treatment well;Patient limited by fatigue   Assessment   Prognosis Good   Problem List Decreased strength;Decreased endurance; Impaired balance;Decreased mobility;Pain;Orthopedic restrictions   Assessment Patient seen for Physical Therapy evaluation  Patient admitted with S/P revision of total hip  Comorbidities affecting patient's physical performance include: none  Personal factors affecting patient at time of initial evaluation include: lives in 2 story house, stairs to enter home, inability to navigate community distances, inability to navigate level surfaces without external assistance and inability to perform current job functions  Prior to admission, patient was independent with functional mobility without assistive device, independent with ADLS and works full time  Please find objective findings from Physical Therapy assessment regarding body systems outlined above with impairments and limitations including weakness, decreased ROM, gait deviations, pain, decreased activity tolerance, decreased functional mobility tolerance, fall risk and orthopedic restrictions  The Barthel Index was used as a functional outcome tool presenting with a score of 55 today indicating marked limitations of functional mobility and ADLS  Patient's clinical presentation is currently unstable/unpredictable as seen in patient's presentation of changing level of pain, increased fall risk, new onset of impairment of functional mobility and new onset of weakness  Pt would benefit from continued Physical Therapy treatment to address deficits as defined above and maximize level of functional mobility  As demonstrated by objective findings, the assigned level of complexity for this evaluation is high     Goals   Patient Goals "walk without pain'   STG Expiration Date 01/11/21   Short Term Goal #1 independent bed mobility, independent transfers, independent ambulation with a walker 150 feet indoor surfaces   Short Term Goal #2 independent HEP   LTG Expiration Date 01/18/21   Long Term Goal #2 independent ambulation outdoor surfaces with least restrictive device 200 feet, independent up and down 19 steps to the second floor of pt's home, improve R hip strength to at least 4-/5   Plan Treatment/Interventions ADL retraining;Functional transfer training;LE strengthening/ROM; Elevations; Therapeutic exercise; Endurance training;Equipment eval/education;Patient/family training;Bed mobility;Gait training   PT Frequency Twice a day   Recommendation   PT Discharge Recommendation Other (Comment)  (OP PT)   Equipment Recommended   (pt has a walker)   Barthel Index   Feeding 10   Bathing 0   Grooming Score 5   Dressing Score 5   Bladder Score 0   Bowels Score 10   Toilet Use Score 5   Transfers (Bed/Chair) Score 10   Mobility (Level Surface) Score 10   Stairs Score 0   Barthel Index Score 54   Licensure   [place] License Number  Kaitlin Mora PT [id number]       Time XX:3320  Time Out:1700  Total Time: 15      S:  "I want to walk"  O:  X 10 each ankle pumps, quad set, glut set, SAQ, AAROM hip abd/add  HEP issued and reviewed  Posterior hip precautions reviewed  Ice applied to R hip  Pt left OOB in recliner with all needs in reach, RN aware  A:  Pt demonstrates good function POD 1 s/p revision posterior CELENA  P:  Continue PT BID        Cloyde Meo, PT

## 2021-01-04 NOTE — H&P
Assessment/Plan:  1  Loosening of prosthetic hip, subsequent encounter  XR hip/pelv 2-3 vws right if performed   2  Right hip pain      3  History of total right hip arthroplasty              Scribe Attestation    I,:  Jaylin Carina am acting as a scribe while in the presence of the attending physician :       I,:  Tamiko Walker, DO personally performed the services described in this documentation    as scribed in my presence  :           Please see below the last office encounter to serve as today's H&P  There been no changes in the patient's overall medical health or orthopedic examination since last evaluation  Patient denies any recent fever, chills, nausea, vomiting, headache, trouble breathing, chest pain  Patient has been seen and cleared by his subspecialist   Patient be a good candidate for aspirin following his procedure for DVT prophylaxis  He will be a good candidate for outpatient physical therapy following his discharge from the hospital   All his questions were addressed this morning  Proceed to OR for revision right total hip arthroplasty  Vital signs will be reviewed prior to the case  Jeffrey Ibarra is a very pleasant 61-year-old male who returns today for follow-up evaluation and preoperative planning and surgical consent for his right hip  He has undergone extensive workup which has revealed 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 him  There is no evidence of infection currently  We again discussed moving forward with a revision total hip arthroplasty  The pre lynn and postoperative expectations were discussed here in the office today  The risks and benefits of undergoing revision right total hip arthroplasty with posterior approach were discussed at length and consents were signed and placed in the chart   Please see risk discussion below  He denies a history of DVT/PE, MRSA infection, cancer, GI bleeding or peptic ulcer, or diabetes    He is not in excessive drinker, smoker, and is not currently using turmeric  I explained that I anticipate revising his femoral component and will thoroughly evaluate the acetabular cup intraoperatively  If this construct appears stable I will leave in place and if there is evidence of failure will also be revised  He understands he will require clearance from his primary care physician and cardiologist   He is a candidate for outpatient physical therapy following the procedure  All of his questions and concerns were addressed today  He will meet with my surgery scheduler to pick a date for his operation and make preoperative arrangements  We will see him back at time of surgery      Subjective: Follow-up evaluation for right hip pain     Patient ID: Soledad Trejo is a 48 y o  male who returns today for preoperative planning and surgical consent for his right hip  He has undergone extensive preoperative workup and has aseptic loosening femoral component in his prosthetic right hip  At his last visit we discussed revision total hip arthroplasty in great detail  He returns today for consent  He continues to experience activity related pain about his hip and femur  This is severe nature  He denies any new injury or trauma      Review of Systems   Constitutional: Positive for activity change  Negative for chills, fever and unexpected weight change  HENT: Negative for hearing loss, nosebleeds and sore throat  Eyes: Negative for pain, redness and visual disturbance  Respiratory: Negative for cough, shortness of breath and wheezing  Cardiovascular: Negative for chest pain, palpitations and leg swelling  Gastrointestinal: Negative for abdominal pain, nausea and vomiting  Endocrine: Negative for polyphagia and polyuria  Genitourinary: Negative for dysuria and hematuria  Musculoskeletal: Positive for arthralgias, joint swelling and myalgias  See HPI   Skin: Negative for rash and wound     Neurological: Negative for dizziness, numbness and headaches  Psychiatric/Behavioral: Negative for decreased concentration and suicidal ideas  The patient is not nervous/anxious              Medical History        Past Medical History:   Diagnosis Date    Acquired pes planus      Arthralgia of ankle      Calcaneal spur      Chronic hip pain      Essential hypertension 2/28/2016    Flat foot      Flat foot 2/28/2016    GERD (gastroesophageal reflux disease)      Hallux valgus      High cholesterol      Hyperlipidemia 2/28/2016    Hypertension      Plantar fibromatosis      Posterior tibial tendinitis             Surgical History         Past Surgical History:   Procedure Laterality Date    COLONOSCOPY        CT EPIDURAL STEROID INJECTION (RANDI LUMBAR)   11/27/2019    CT EPIDURAL STEROID INJECTION (RANDI LUMBAR)   6/5/2020    EVACUATION OF HEMATOMA Left       knee    FL INJECTION RIGHT HIP (NON ARTHROGRAM)   10/8/2019    HAND SURGERY         reconstruction of tendons in hand s/p inury/laceration    OSTEOCHONDROMA EXCISION         left knee    RI TOTAL HIP ARTHROPLASTY Right 2/29/2016     Procedure: ARTHROPLASTY HIP TOTAL ANTERIOR;  Surgeon: Rajiv Topete MD;  Location: [facility];  Service: Orthopedics                 Family History   Problem Relation Age of Onset    Diabetes Mother      Atrial fibrillation Mother      Lymphoma Father      COPD Father      Heart disease Father           Social History            Occupational History    Not on file   Tobacco Use    Smoking status: Never Smoker    Smokeless tobacco: Never Used   Substance and Sexual Activity    Alcohol use: Yes       Alcohol/week: 28 0 standard drinks       Types: 28 Cans of beer per week    Drug use: No    Sexual activity: Not on file            Current Outpatient Medications:     amLODIPine (NORVASC) 10 mg tablet, Take 10 mg by mouth daily  , Disp: , Rfl:     amoxicillin (AMOXIL) 500 mg capsule, take 4 capsules by mouth 1 hour prior to appointment, Disp: , Rfl: 0    atorvastatin (LIPITOR) 20 mg tablet, Take 20 mg by mouth daily  , Disp: , Rfl:     b complex vitamins capsule, Take 1 capsule by mouth daily, Disp: , Rfl:     clotrimazole-betamethasone (LOTRISONE) 1-0 05 % cream, Apply 1 application topically 2 (two) times a day To affected area, Disp: , Rfl: 0    Coenzyme Q10 (COQ-10) 100 MG CAPS, Take by mouth, Disp: , Rfl:     esomeprazole (NexIUM) 20 mg capsule, Take 20 mg by mouth every morning before breakfast , Disp: , Rfl:     ezetimibe-simvastatin (VYTORIN) 10-10 mg per tablet, Take 1 tablet by mouth daily at bedtime, Disp: , Rfl:     hydrochlorothiazide (MICROZIDE) 12 5 mg capsule, Take 12 5 mg by mouth daily, Disp: , Rfl:     losartan (COZAAR) 100 MG tablet, Take 100 mg by mouth, Disp: , Rfl:     Melatonin 1 MG CAPS, Take 1 mg by mouth, Disp: , Rfl:     meloxicam (MOBIC) 15 mg tablet, , Disp: , Rfl: 0    NON FORMULARY, 1 tablet daily Indications: vitamin c, folic acid, and iron-per meds  , Disp: , Rfl:     pantoprazole (PROTONIX) 40 mg tablet, , Disp: , Rfl:     simvastatin (ZOCOR) 40 mg tablet, Take by mouth, Disp: , Rfl:      No Known Allergies     Objective:      Vitals:     12/02/20 0715   BP: 160/100   Pulse: 82         Body mass index is 38 35 kg/m²      Right Hip Exam      Tenderness   The patient is experiencing tenderness in the lateral (Lateral thigh)      Range of Motion   Abduction:  45 abnormal   Adduction:  30 abnormal   Extension:  0 abnormal   Flexion:  80 abnormal   External rotation:  40 abnormal   Internal rotation:  20 abnormal      Muscle Strength   Abduction: 5/5   Adduction: 5/5   Flexion: 4/5      Tests   ATTILA: positive     Other   Erythema: absent  Scars: present  Sensation: normal  Pulse: present     Comments:  No tenderness palpation of her greater troch but does have tenderness to palpation deeply proximal lateral thigh and anterior lateral thigh    No sign of infection around well-healed surgical scar of his anterior hip  Range of motion was as above but at the extremes of motion patient did have pain referred to proximal aspect of thigh posteriorly and anteriorly  Pain was significantly worsened with Filbert Foots and ATTILA no leg length deformity negative neuro straight leg raise provocative testing of his hip had referred pain to the thigh and not directly to groin, neurovascular intact                 Physical Exam  Vitals signs and nursing note reviewed  Constitutional:       Appearance: He is well-developed  HENT:      Head: Normocephalic and atraumatic  Eyes:      General: No scleral icterus  Conjunctiva/sclera: Conjunctivae normal    Neck:      Musculoskeletal: Normal range of motion and neck supple  Cardiovascular:      Rate and Rhythm: Normal rate  Pulmonary:      Effort: Pulmonary effort is normal  No respiratory distress  Musculoskeletal:      Comments: As noted in HPI   Skin:     General: Skin is warm and dry  Neurological:      Mental Status: He is alert and oriented to person, place, and time  Psychiatric:         Behavior: Behavior normal             I have personally reviewed pertinent films in PACS  X-ray of the right hip with magnification marker obtained on 12/02/2020 reviewed demonstrating a total hip arthroplasty with evidence of loosening of the femoral component  The acetabular cup appears well positioned and aligned and without evidence of failure  There is no new fracture, dislocation, lytic or blastic lesion      The patient was counseled in detail regarding the diagnosis, the treatment options available, the prognosis of each treatment option, the potential risks and complications    These are, but are not limited to; deep vein thrombosis, pulmonary embolism, neurologic and vascular injury, infection, instability, leg length discrepancy, dislocation, hematoma, reflex sympathetic dystrophy, loss of range of motion, ankylosis of the knee, fracture, screw or prosthetic perforation, chronic pain, acute pain, chronic leg pain and edema, loosening, death, heart attack, and stroke  The patient's questions were answered in detail  The patient demonstrates understanding of these risks and wishes to proceed with surgery

## 2021-01-04 NOTE — PROGRESS NOTES
Progress Note - Orthopedics   Antelmo Correa 48 y o  male MRN: [id number]  Unit/Bed#: 2 [place] [address] Encounter: [id number]    Assessment:  1) POD#0 s/p Revision R CELENA- one component    Plan:  Ancef 3 g IV x 2 for 24 hours postop  DVT prophylaxis:  Aspirin 325 PO BID/SCD's/Ambulation  WBAT RLE  PT/OT- WBAT RLE  Analgesia PRN  Follow up AM labs  D/c hernadez in AM POD #1  Dressing- monitor for drainage  Discharge planning - disposition pending progress with PT  Will plan forward discharged to home postoperative day 1 versus postoperative day 2  Patient scheduled to start outpatient physical therapy later this week  Weight bearing: WBAT RLE    VTE Pharmacologic Prophylaxis: ASA 325mg PO BID  VTE Mechanical Prophylaxis: sequential compression device    Subjective:  Patient seen examined in PACU  Pain controlled  Events of surgery discussed with the patient and the patient's wife via telephone  Vitals: Blood pressure 147/99, pulse 86, temperature 98 9 °F (37 2 °C), resp  rate 18, height 5' 11" (1 803 m), weight 125 kg (275 lb), SpO2 (!) 89 %  ,Body mass index is 38 35 kg/m²  Intake/Output Summary (Last 24 hours) at 1/4/2021 1613  Last data filed at 1/4/2021 1501  Gross per 24 hour   Intake 2000 ml   Output 700 ml   Net 1300 ml       Invasive Devices     Peripheral Intravenous Line            Peripheral IV 01/04/21 Right Hand less than 1 day          Drain            Urethral Catheter Latex 16 Fr  less than 1 day                Physical Exam: NAD  Ortho Exam: RLE: Dsg c/d/i, thigh soft, abduction pillow in place, +DF/PF/EHL, +L3-S1 SILT, DP2+, foot warm, leg lengths appear equal    Lab, Imaging and other studies: PO XR R hip:  Reviewed and acceptable    Jennifre VAZQUEZ    Division of Adult Reconstruction  Department of Orthopaedic Surgery  [facility]

## 2021-01-04 NOTE — ANESTHESIA POSTPROCEDURE EVALUATION
Post-Op Assessment Note    CV Status:  Stable  Pain Score: 0    Pain management: adequate     Mental Status:  Sleepy   Hydration Status:  Euvolemic   PONV Controlled:  Controlled   Airway Patency:  Patent      Post Op Vitals Reviewed: Yes      Staff: CRNA, Anesthesiologist         No complications documented      BP  158/86   Temp   98   Pulse 92 nsr   Resp 16   SpO2 97% FM 6lpm

## 2021-01-04 NOTE — OP NOTE
OPERATIVE REPORT  PATIENT NAME: Lyric Park    :  1965  MRN: [id number]  Pt Location: [facility] OR ROOM 01    SURGERY DATE: 2021    Surgeon(s) and Role:     * Catarino Dias DO - Primary     * Vianey Turk PA-C - Assisting, no qualified resident was available an assistant was needed for patient positioning, prepping and draping, soft tissue retraction, protection of vital structures throughout the case, and to complete the case safely  Debbie Montes ATC/OTC - 2nd Assist    Preop Diagnosis:  Loosening of prosthetic hip, subsequent encounter [T84 038D, Z96 649]  Right hip pain  History of total right hip arthroplasty [Z96 641]    Post-Op Diagnosis Codes:     * Loosening of prosthetic hip, subsequent encounter [T84 038D, Z96 649]     * Right hip pain     * History of total right hip arthroplasty [Z96 641]    Procedure(s) (LRB):  ARTHROPLASTY HIP TOTAL REVISION - RIGHT (Right)    Specimen(s):  Right femoral canal reaming  ID Type Source Tests Collected by Time Destination   1 : right femoral canal Tissue Bone TISSUE EXAM Catarino Dias DO 2021 1257        Estimated Blood Loss:   350 mL    Drains:  None  Urethral Catheter Latex 16 Fr   (Active)   Site Assessment Clean;Skin intact 21 1501   Collection Container Standard drainage bag 21 1501   Securement Method Securing device (Describe) 21 1501   Output (mL) 100 mL 21 1501   Number of days: 0       Anesthesia Type:   General    Antibiotics:  Ancef 3 g    Intravenous fluids:  1800 cc    Urine output:  Pineda:  250 cc    Implant:  Reclaim revision femur prosthesis:  16 mm x 140 mm straight tapered stem, 24 mm x 85 mm 85 mm conical proximal body, Biolox delta ceramic +5 36 mm head    Operative Indications:  Loosening of prosthetic hip, subsequent encounter [T84 038D, Z96 649]  History of total right hip arthroplasty [Z96 641]  Patient is a 73-year-old male that underwent right total hip arthroplasty via the anterior approach with Dr Anabelle Ponce on 2/29/2016  There were no postoperative complications reported  In 2019 the patient had complaints of right thigh pain  He was worked up intermittently with bone scans and there was a suspicion that the femoral stem may be loose and not integrating well  The patient initiate care with me in in June of 2020 and again I performed a thorough diagnostic evaluation and found that his right hip was undergoing aseptic loosening of his femoral component  The did not appear to be any loosening of his acetabular component preoperatively  There was no infection found preoperatively  I recommended that the patient undergo revision right hip arthroplasty with revision of his femoral component  He was agreeable to this  He had to wait until his work demands would permit him to undergo surgical intervention with the appropriate postoperative recovery  He was medically optimized for the intended procedure and ultimately underwent revision right hip arthroplasty on 1/4/2020  For full detail please see the outpatient orthopedic record  Operative Findings:  Intraoperatively native tissue was preserved down to the capsule of the right hip joint  Once the capsulotomy was performed there was evidence of significant scarring within the capsule that had to be debulked in order to mobilize the hip and dislocate the hip  There was reactive bone that had grown over the top part of the prosthesis and collar  After the reactive bone was removed from the proximal perimeter of the implant the implant demonstrated that it was grossly loose to manual manipulation  The stem was removed without complication without difficulty  The acetabular component was evaluated and tested and appeared to be stable  The polyethylene liner was examined and was without significant signs of wear, scratch, or aberration  Revision femoral arthroplasty was then pursued    A modular, tapered, Golden implant was successfully implanted into the femur  Of note preparation of the femoral canal was quite difficult due to the native host bone that was incredibly dense  Samples of intramedullary reaming were sent to pathology for evaluation  Ultimately the final construct demonstrated restoration of appropriate head center height, stable fixation within the femur, and excellent stability in the position of sleep, hip flexion to 90° and 70° of internal rotation of the femur, and leg lengths that appeared to be equal intraoperatively  The patient tolerated procedure well  There were no complications  Complications:   None    Procedure and Technique:  The patient was identified and marked in preoperative holding area  The right lower extremity was identified and marked by the orthopedic staff  The consents were reviewed for laterality and intended procedure and these were deemed to be correct and placed in the chart  Patient's final questions were answered  The patient was transferred to the operating room where they were transferred from the hospital stretcher to the operating room table  General anesthesia was administered by the anesthesia staff  The patient was then positioned in the left lateral decubitus position with the right hip facing up  This was done with the Stulberg positioner with all bony prominences well-padded  The right lower extremity was prepped and draped in normal sterile fashion  Antibiotics in the form of Ancef 3 g were administered by anesthesia  A final timeout was performed and all members of the operating room staff were in agreement of the intended procedure and correct laterality of the procedure  Tranexamic acid was administered by the anesthesia staff  The perimeter of the proximal greater trochanter and femur was delineated using a marking pen in preparation for posterior lateral approach to the hip   A curvilinear incision was centered over the posterior one third of the greater trochanter and curved slightly towards the PSIS proximally  Skin incision was made using a scalpel  Hemostasis was achieved with electrocautery and dissection was carried down through the subcutaneous tissues using the Bovie  The deep fascia/IT band was incised using a scalpel in line with the skin incision and the trochanteric bursa adhesions were freed using blunt dissection  The fibers of the gluteus cherri were split with electrocautery while protecting the sciatic nerve and hemostasis was achieved using an aqua mantis  The sciatic nerve was palpated deeply and the Charnley retractor was placed superficial to the nerve and under the deep fascia  The external rotators were identified and released from the posterior portion of the trochanter in addition to the capsule  Throughout this portion of the approach the native tissue appeared to be healthy and without signs of infection or particulate wear  The capsulotomy was then carried posteriorly to the acetabulum taking great care not to encounter the polyethylene liner  The leaflets of the capsulotomy were tagged with a 2  Ethibond suture  During the tacking process a needle stick occurred with the Ethibond needle  The needle was removed from the field, gloves were removed and the surgeon's hand was soaked with sterile Betadine solution, new gloves were donned  Once the capsulotomy was performed and released from the posterior aspect of the femur, down past the reactive bone, and down to the lesser troch it was evident that the intra-articular space was encased with significant amount of scar tissue from the prior procedure  The leaflets of the capsulotomy had to be debulked in a laminar fashion to gain proper exposure of the hip articulation  Again the intra-articular space was benign in appearance without any significant amount of fluid  There was no sign of particular wear  There is no sign of infection    Once the intra-articular space was completely from intra-articular scar the hip articulation was dislocated  There was no significant wear on the ceramic head or on the polyethylene insert  There was significant amount of reactive bone encapsulated around the proximal aspect of the femur and around the neck of the implant above the collar  Even despite the presence of reactive bone the femoral stem did appear to be grossly loose with manual manipulation  Utilizing various osteotomes the reactive bone around the proximal aspect of the implantable the collar was removed carefully taking great care not to damage the host bone around the calcar  The collar of the implant was freed and visualized  The lateral shoulder of the implant was also freed from reactive overgrowth of bone  The stem was rotationally loose and unstable however could not be removed manually therefore a extraction device was applied around the head and neck and with one gentle strike of the extraction device the implant was removed from the femoral canal   The proximal aspect of the implant did have some HA coating still present on the implant however the distal aspect of the implant was devoid of HA coating  There was no bony ingrowth evident and no spot welding present  With the implant removed the femur was mobilized anteriorly into a pocket of scar tissue in order to inspect the acetabulum closely  The rim of the acetabular component appeared to be appropriately oriented within the patient's native acetabulum and the anteversion and inclination was acceptable  Polyethylene insert was inspected and there was no sign of scratching, wear, aberration or oxidation  Utilizing an instrumented the acetabular component was manually tested and it was deemed to be stable without signs of motion  With these findings it was deemed that the acetabular component could be retained and at this point the polyethylene insert could be retained  Preparation for the revision femoral implant and began    Utilizing the reamers for the reclaim revision hip system the diaphysis of the femur was reamed utilizing the starting Reamer and attempts at caring a down to the appropriate depth were made  However this was quite difficult due to the patient's native host bone  It was quite dense and difficult to ream   There was also a pedestal at the distal extent of where the prior stem was localized which was also quite hard  The cement removal drills had to be utilized to break up the pedestal distally and aggressively initiate reaming past the pedestal into the diaphysis  Once the pedestal was violated and the canal was gently and carefully opened up utilizing the cement drills reaming with the starting Reamer for the reclaim system then continued  Starting with a 13 mm x 140 mm Reamer the canal was reamed down to the level to accommodate an 85 proximal body  Again the patient's host bone was incredibly dense and the Reamer had to be cleared multiple times and reinserted in order to gain the appropriate depth  Reaming was carried up in a similar fashion sequentially until a size 16 Reamer was obtained and this demonstrated good tight chatter within the femoral canal   The final 16 mm x 140 mm straight distal tapered fluted stem was inserted into the canal and impacted into its final position  Reamings from the intramedullary canal of the femur were collected and sent to pathology for evaluation  The proximal femur was opened up using the opening Reamer in preparation for the conical body  The conical body reamers were then inserted and proximal femur and reaming was carried up to accommodate of 24 mm x 85 mm conical proximal body  This was done as significant offset was needed to match the patient's host anatomy  In addition the prior stem was also a high offset primary stem that was removed  Once the proximal femur was prepared the 24 mm x 85 mm 85 mm proximal body and neck trial was assembled on the final distal stem construct  Anteversion was set to approximately 10° of anteversion and the neck was locked into place  The +1 5 ball was trialed 1st and leg length evaluation demonstrated that the legs were short however stability was reasonable  The +5 ball was then placed upon the trial construct and trialing continued  Ultimately a 85 mm offset neck and a +5 36 mm head was used and the hip was reduced into the acetabular component  Leg lengths were measured and the leg lengths appeared to be optimized and equal by the overlay intraoperative evaluation  The hip was taken through the range of motion and demonstrated 1 mm of shuck, appropriate tension on the abductors, no hamstring kick, good stability in the position of sleep, and good stability at 90° of hip flexion and internal rotation of the femur to 70°  This was deemed that this would be the final composition for the femoral component  The version was marked using electrocautery on the proximal calcar  All trial components were removed  The final 24 mm x 85 mm 85 mm proximal conical body was then inserted into the proximal femur and attached to the distally potted stem  These 2 were mated and secured while desired version was maintained proximally  Once the final construct was completely assembled the trunnion was cleaned and dried and the +536 mm Biolox Delta ceramic head was impacted upon the trunnion  The hip was reduced and taken through a final range of motion test and leg length evaluation  Leg lengths appeared to be equal and stability was unchanged  No fractures were appreciated in the calcar or greater trochanter  The hip was copiously irrigated with normal saline solution, normal saline solution with bacitracin, followed by 1 L normal saline solution  There was no significant bleeding  The capsulotomy was then reapproximated using #2 Ethibond suture   The external rotators were reapproximated to their position on the posterior femur and trochanter using a #2 Ethibond suture for the piriformis  The Charnley retractor was removed and the sciatic nerve was palpated and was at physiologic tension and in continuity  The deep fascia and IT band was closed with a #1 bidirectional barbed suture  The deep subcutaneous tissue was reapproximated using an un-dyed 0 Vicryl, the subcutaneous tissue was reapproximated using un-dyed 2-0 Vicryl, and the skin was reapproximated subcuticularly using a 3-0 absorbable bidirectional barbed suture  The skin edges were sealed with Dermabond skin adhesive  A clean sterile silver impregnated Mepilex dressing was applied after the glue had dried  The abduction pillow was in placed between the bilateral lower extremities and the patient was then repositioned in the supine position on the operating table  The patient was then awakened from anesthesia without any complications and the patient was then transferred to the hospital bed on which they were transferred to PACU in stable condition        I was present for all critical portions of the procedure    Patient Disposition:  PACU     SIGNATURE: Lynn Mandujano DO  DATE: January 4, 2021  TIME: 3:37 PM

## 2021-01-04 NOTE — PLAN OF CARE
Problem: Potential for Falls  Goal: Patient will remain free of falls  Description: INTERVENTIONS:  - Assess patient frequently for physical needs  -  Identify cognitive and physical deficits and behaviors that affect risk of falls    -  [place] fall precautions as indicated by assessment   - Educate patient/family on patient safety including physical limitations  - Instruct patient to call for assistance with activity based on assessment  - Modify environment to reduce risk of injury  - Consider OT/PT consult to assist with strengthening/mobility  Outcome: Progressing     Problem: PAIN - ADULT  Goal: Verbalizes/displays adequate comfort level or baseline comfort level  Description: Interventions:  - Encourage patient to monitor pain and request assistance  - Assess pain using appropriate pain scale  - Administer analgesics based on type and severity of pain and evaluate response  - Implement non-pharmacological measures as appropriate and evaluate response  - Consider cultural and social influences on pain and pain management  - Notify physician/advanced practitioner if interventions unsuccessful or patient reports new pain  Outcome: Progressing     Problem: INFECTION - ADULT  Goal: Absence or prevention of progression during hospitalization  Description: INTERVENTIONS:  - Assess and monitor for signs and symptoms of infection  - Monitor lab/diagnostic results  - Monitor all insertion sites, i e  indwelling lines, tubes, and drains  - Monitor endotracheal if appropriate and nasal secretions for changes in amount and color  - [place] appropriate cooling/warming therapies per order  - Administer medications as ordered  - Instruct and encourage patient and family to use good hand hygiene technique  - Identify and instruct in appropriate isolation precautions for identified infection/condition  Outcome: Progressing

## 2021-01-05 PROBLEM — R65.10 SIRS (SYSTEMIC INFLAMMATORY RESPONSE SYNDROME) (HCC): Status: ACTIVE | Noted: 2021-01-04

## 2021-01-05 PROBLEM — N17.9 AKI (ACUTE KIDNEY INJURY) (HCC): Status: ACTIVE | Noted: 2021-01-05

## 2021-01-05 PROBLEM — R65.10 SIRS (SYSTEMIC INFLAMMATORY RESPONSE SYNDROME) (HCC): Status: RESOLVED | Noted: 2021-01-04 | Resolved: 2021-01-05

## 2021-01-05 PROBLEM — E87.1 HYPONATREMIA: Status: ACTIVE | Noted: 2021-01-05

## 2021-01-05 PROBLEM — D64.9 ANEMIA: Status: ACTIVE | Noted: 2021-01-05

## 2021-01-05 LAB
ANION GAP SERPL CALCULATED.3IONS-SCNC: 5 MMOL/L (ref 4–13)
ANION GAP SERPL CALCULATED.3IONS-SCNC: 8 MMOL/L (ref 4–13)
ATRIAL RATE: 113 BPM
BUN SERPL-MCNC: 19 MG/DL (ref 5–25)
BUN SERPL-MCNC: 20 MG/DL (ref 5–25)
CALCIUM SERPL-MCNC: 7.7 MG/DL (ref 8.3–10.1)
CALCIUM SERPL-MCNC: 8.4 MG/DL (ref 8.3–10.1)
CHLORIDE SERPL-SCNC: 100 MMOL/L (ref 100–108)
CHLORIDE SERPL-SCNC: 98 MMOL/L (ref 100–108)
CO2 SERPL-SCNC: 27 MMOL/L (ref 21–32)
CO2 SERPL-SCNC: 29 MMOL/L (ref 21–32)
CREAT SERPL-MCNC: 1.12 MG/DL (ref 0.6–1.3)
CREAT SERPL-MCNC: 1.4 MG/DL (ref 0.6–1.3)
ERYTHROCYTE [DISTWIDTH] IN BLOOD BY AUTOMATED COUNT: 12 % (ref 11.6–15.1)
GFR SERPL CREATININE-BSD FRML MDRD: 57 ML/MIN/1.73SQ M
GFR SERPL CREATININE-BSD FRML MDRD: 74 ML/MIN/1.73SQ M
GLUCOSE SERPL-MCNC: 149 MG/DL (ref 65–140)
GLUCOSE SERPL-MCNC: 155 MG/DL (ref 65–140)
GLUCOSE SERPL-MCNC: 158 MG/DL (ref 65–140)
GLUCOSE SERPL-MCNC: 165 MG/DL (ref 65–140)
GLUCOSE SERPL-MCNC: 198 MG/DL (ref 65–140)
HCT VFR BLD AUTO: 33.6 % (ref 36.5–49.3)
HGB BLD-MCNC: 11 G/DL (ref 12–17)
MCH RBC QN AUTO: 30.6 PG (ref 26.8–34.3)
MCHC RBC AUTO-ENTMCNC: 32.7 G/DL (ref 31.4–37.4)
MCV RBC AUTO: 93 FL (ref 82–98)
P AXIS: 54 DEGREES
PLATELET # BLD AUTO: 328 THOUSANDS/UL (ref 149–390)
PMV BLD AUTO: 9.6 FL (ref 8.9–12.7)
POTASSIUM SERPL-SCNC: 3.6 MMOL/L (ref 3.5–5.3)
POTASSIUM SERPL-SCNC: 3.8 MMOL/L (ref 3.5–5.3)
PR INTERVAL: 158 MS
QRS AXIS: 12 DEGREES
QRSD INTERVAL: 88 MS
QT INTERVAL: 336 MS
QTC INTERVAL: 460 MS
RBC # BLD AUTO: 3.6 MILLION/UL (ref 3.88–5.62)
SODIUM SERPL-SCNC: 133 MMOL/L (ref 136–145)
SODIUM SERPL-SCNC: 134 MMOL/L (ref 136–145)
T WAVE AXIS: 23 DEGREES
VENTRICULAR RATE: 113 BPM
WBC # BLD AUTO: 13.48 THOUSAND/UL (ref 4.31–10.16)

## 2021-01-05 PROCEDURE — 93010 ELECTROCARDIOGRAM REPORT: CPT | Performed by: INTERNAL MEDICINE

## 2021-01-05 PROCEDURE — 99252 IP/OBS CONSLTJ NEW/EST SF 35: CPT | Performed by: INTERNAL MEDICINE

## 2021-01-05 PROCEDURE — 97167 OT EVAL HIGH COMPLEX 60 MIN: CPT

## 2021-01-05 PROCEDURE — 97110 THERAPEUTIC EXERCISES: CPT

## 2021-01-05 PROCEDURE — 97530 THERAPEUTIC ACTIVITIES: CPT

## 2021-01-05 PROCEDURE — 80048 BASIC METABOLIC PNL TOTAL CA: CPT | Performed by: PHYSICIAN ASSISTANT

## 2021-01-05 PROCEDURE — 97535 SELF CARE MNGMENT TRAINING: CPT

## 2021-01-05 PROCEDURE — 85027 COMPLETE CBC AUTOMATED: CPT | Performed by: PHYSICIAN ASSISTANT

## 2021-01-05 PROCEDURE — 99024 POSTOP FOLLOW-UP VISIT: CPT | Performed by: ORTHOPAEDIC SURGERY

## 2021-01-05 PROCEDURE — 82948 REAGENT STRIP/BLOOD GLUCOSE: CPT

## 2021-01-05 RX ORDER — OXYCODONE HYDROCHLORIDE 5 MG/1
TABLET ORAL
Qty: 60 TABLET | Refills: 0 | Status: SHIPPED | OUTPATIENT
Start: 2021-01-05 | End: 2021-03-31 | Stop reason: ALTCHOICE

## 2021-01-05 RX ORDER — DOCUSATE SODIUM 100 MG/1
100 CAPSULE, LIQUID FILLED ORAL 2 TIMES DAILY
Qty: 60 CAPSULE | Refills: 0 | Status: SHIPPED | OUTPATIENT
Start: 2021-01-05 | End: 2021-01-20 | Stop reason: ALTCHOICE

## 2021-01-05 RX ORDER — ACETAMINOPHEN 325 MG/1
975 TABLET ORAL EVERY 8 HOURS
Refills: 0
Start: 2021-01-05 | End: 2021-03-31 | Stop reason: ALTCHOICE

## 2021-01-05 RX ORDER — ASPIRIN 325 MG
325 TABLET ORAL 2 TIMES DAILY
Qty: 84 TABLET | Refills: 0 | Status: SHIPPED | OUTPATIENT
Start: 2021-01-05 | End: 2021-03-31 | Stop reason: ALTCHOICE

## 2021-01-05 RX ADMIN — ACETAMINOPHEN 975 MG: 325 TABLET, FILM COATED ORAL at 08:31

## 2021-01-05 RX ADMIN — DOCUSATE SODIUM 100 MG: 100 CAPSULE, LIQUID FILLED ORAL at 08:31

## 2021-01-05 RX ADMIN — ASPIRIN 325 MG: 325 TABLET, FILM COATED ORAL at 08:32

## 2021-01-05 RX ADMIN — ACETAMINOPHEN 975 MG: 325 TABLET, FILM COATED ORAL at 03:24

## 2021-01-05 RX ADMIN — OXYCODONE HYDROCHLORIDE 10 MG: 10 TABLET ORAL at 15:52

## 2021-01-05 RX ADMIN — INSULIN LISPRO 1 UNITS: 100 INJECTION, SOLUTION INTRAVENOUS; SUBCUTANEOUS at 12:44

## 2021-01-05 RX ADMIN — INSULIN LISPRO 1 UNITS: 100 INJECTION, SOLUTION INTRAVENOUS; SUBCUTANEOUS at 08:32

## 2021-01-05 RX ADMIN — SODIUM CHLORIDE 125 ML/HR: 0.9 INJECTION, SOLUTION INTRAVENOUS at 10:14

## 2021-01-05 RX ADMIN — PANTOPRAZOLE SODIUM 40 MG: 40 TABLET, DELAYED RELEASE ORAL at 08:32

## 2021-01-05 RX ADMIN — CEFAZOLIN SODIUM 3000 MG: 1 INJECTION, POWDER, FOR SOLUTION INTRAMUSCULAR; INTRAVENOUS at 03:24

## 2021-01-05 RX ADMIN — GABAPENTIN 200 MG: 100 CAPSULE ORAL at 08:32

## 2021-01-05 NOTE — ASSESSMENT & PLAN NOTE
Continue statin, home dose of simvastatin 40 mg substituted with formulary equivalent of pravastatin 80 mg daily

## 2021-01-05 NOTE — CONSULTS
[address] Internal [facility]  Consult- Aurther Like 1965, 48 y o  male MRN: [id number]  Unit/Bed#: [address] [place] [address] Encounter: [id number]  Primary Care Provider: Jaret Hampton DO   Date and time admitted to hospital: 1/4/2021  7:26 AM    Inpatient consult to Internal Medicine  Consult performed by: Laquita Macdonald PA-C  Consult ordered by: Melissa Castro DO          * S/P revision of total hip  Assessment & Plan  Patient is POD #0 from revision of total R hip arthroplasty  Tolerated the procedure well, working with PT this afternoon and has been up and OOB, ambulating in the hallway with the use of walker with minimal discomfort  Plan per primary team  Continue  PO BID, SCDs for DVT prophylaxis  PT/OT    SIRS (systemic inflammatory response syndrome) ([facility] [address] )  Assessment & Plan  Patient noted to have tachycardia ranging from 100-110's in post-op period  RRR on exam, EKG pending  Patient denies any chest pain, palpitations, SOB  Patient remains afebrile    AM labs pending  Continue to monitor fever curve during admission  AM labs showed leukocytosis - cultures, lactic pending; doubt infectious source at this time, suspect reactive WBC in post-op period, but given SIRS criteria will r/o infectious etiology  PRN pain medication per primary team      MICHAEL (acute kidney injury) ([facility] [address] )  Assessment & Plan  Baseline Cr 0 8-1 4  Cr 1 40 this AM  Start gentle IVF, repeat BMP in AM    Essential hypertension  Assessment & Plan  Continue home medications including losartan 100 mg daily and HCTZ 12 5 mg daily    Anemia  Assessment & Plan  Hg 11 0 on AM labs   Previous labs from 1 month ago with Hg of 15 4  Continue to monitor, patient denies any sx of blood loss and is otherwise asymptoamtic    Type 2 diabetes mellitus without complication, without long-term current use of insulin (HCC)  Assessment & Plan  Lab Results   Component Value Date    HGBA1C 6 5 (H) 12/08/2020       No results for input(s): POCGLU in the last 72 hours  Blood Sugar Average: Last 72 hrs:     Hold home dose of metformin during admission  Add SSI coverage with meals     Hyperlipidemia  Assessment & Plan  Continue statin, home dose of simvastatin 40 mg substituted with formulary equivalent of pravastatin 80 mg daily    Hyponatremia  Assessment & Plan  Mild hyponatremia, 133  IVF  Repeat BMP in AM        VTE Prophylaxis: Sequential compression device (Venodyne)  ASA / sequential compression device     Counseling / Coordination of Care Time: 30 minutes  Greater than 50% of total time spent on patient counseling and coordination of care  Collaboration of Care: Were Recommendations Directly Discussed with Primary Treatment Team? - No     History of Present Illness:    Jerry Rodrigues is a 48 y o  male who is originally admitted to the orthopedic surgery service for elective revision of total R hip arthroplasty  We are consulted for medical management, tachycardia in post-op period  Patient has a PMH of HTN, HLD, recently diagnosed DM2 on metformin  He is doing well in the post-op period and reports minimal pain  He states his pain went up to a 4/10 while he was ambulating in the hallway with the use of his walker, but at rest his pain is a 1/10  He states he feels better now compared to the immediate post-op period from his original hip arthroplasty  He continues to get up OOB with the use of his walker and is keeping up with his exercises  He denies any other complaints at present  He follows with his PCP and was recently diagnosed with DM2 when completing pre-operative clearance and was started on metformin  Review of Systems:    Review of Systems   Constitutional: Negative for chills, fatigue and fever  HENT: Negative for congestion, rhinorrhea and sore throat  Eyes: Negative for visual disturbance  Respiratory: Negative for cough, chest tightness, shortness of breath and wheezing      Cardiovascular: Negative for chest pain, palpitations and leg swelling  Gastrointestinal: Negative for abdominal distention, abdominal pain, diarrhea, nausea and vomiting  Genitourinary: Negative for decreased urine volume, dysuria and hematuria  Musculoskeletal: Positive for arthralgias (mild, 1/10 at rest currently)  Negative for back pain and myalgias  Skin: Negative for pallor and rash  Neurological: Negative for dizziness, weakness, light-headedness, numbness and headaches         Past Medical and Surgical History:     Past Medical History:   Diagnosis Date    Acquired pes planus     Arthralgia of ankle     Calcaneal spur     Chronic hip pain     Diabetes mellitus ([facility] [address] )     recently put on RX for elevated HGB A1C    Essential hypertension 2/28/2016    Flat foot     Flat foot 2/28/2016    GERD (gastroesophageal reflux disease)     Hallux valgus     High cholesterol     Hyperlipidemia 2/28/2016    Hypertension     Plantar fibromatosis     Posterior tibial tendinitis        Past Surgical History:   Procedure Laterality Date    COLONOSCOPY      CT EPIDURAL STEROID INJECTION (RANDI LUMBAR)  11/27/2019    CT EPIDURAL STEROID INJECTION (RANDI LUMBAR)  6/5/2020    EVACUATION OF HEMATOMA Left     knee    FL INJECTION RIGHT HIP (NON ARTHROGRAM)  10/8/2019    HAND SURGERY      reconstruction of tendons in hand s/p inury/laceration    OSTEOCHONDROMA EXCISION      left knee    FL TOTAL HIP ARTHROPLASTY Right 2/29/2016    Procedure: ARTHROPLASTY HIP TOTAL ANTERIOR;  Surgeon: Smiley Hdez MD;  Location: [facility];  Service: Orthopedics       Meds/Allergies:    all medications and allergies reviewed    Allergies: No Known Allergies    Social History:     Marital Status: /Civil Union    Substance Use History:   Social History     Substance and Sexual Activity   Alcohol Use Yes    Alcohol/week: 28 0 standard drinks    Types: 28 Cans of beer per week     Social History     Tobacco Use   Smoking Status Never Smoker   Smokeless Tobacco Never Used Social History     Substance and Sexual Activity   Drug Use No       Family History:    Family History   Problem Relation Age of Onset    Diabetes Mother     Atrial fibrillation Mother     Lymphoma Father     COPD Father     Heart disease Father        Physical Exam:     Vitals:   Blood Pressure: 132/77 (01/05/21 0327)  Pulse: 86 (01/05/21 0327)  Temperature: (!) 97 3 °F (36 3 °C) (01/05/21 0327)  Temp Source: Temporal (01/04/21 0744)  Respirations: 16 (01/04/21 2254)  Height: 5' 11" (180 3 cm) (01/04/21 0744)  Weight - Scale: 125 kg (276 lb 3 8 oz) (01/05/21 0600)  SpO2: 96 % (01/05/21 0327)    Physical Exam  Constitutional:       General: He is not in acute distress  Appearance: He is well-developed  He is obese  He is not ill-appearing  Comments: Pt found sitting in armchair next to bed, stood during the exam to reposition and tolerated minimal WB on RLE  HENT:      Head: Normocephalic and atraumatic  Neck:      Musculoskeletal: Normal range of motion and neck supple  Cardiovascular:      Rate and Rhythm: Regular rhythm  Tachycardia present  Heart sounds: Normal heart sounds  No murmur  No friction rub  No gallop  Pulmonary:      Effort: Pulmonary effort is normal  No respiratory distress  Breath sounds: Normal breath sounds  No wheezing or rales  Abdominal:      General: Bowel sounds are normal  There is no distension  Palpations: Abdomen is soft  Tenderness: There is no abdominal tenderness  There is no guarding  Musculoskeletal:         General: Swelling (mild swelling at R hip with bruising noted around the bandaged incision sites without surrounding erythema/drainage) present  No tenderness  Right lower leg: No edema  Left lower leg: No edema  Skin:     General: Skin is warm and dry  Findings: No erythema or rash  Neurological:      Mental Status: He is alert and oriented to person, place, and time     Psychiatric:         Mood and Affect: Mood normal          Behavior: Behavior normal          Additional Data:     Lab Results: I have personally reviewed pertinent reports  Results from last 7 days   Lab Units 01/05/21  0457   WBC Thousand/uL 13 48*   HEMOGLOBIN g/dL 11 0*   HEMATOCRIT % 33 6*   PLATELETS Thousands/uL 328     Results from last 7 days   Lab Units 01/05/21  0457   SODIUM mmol/L 133*   POTASSIUM mmol/L 3 8   CHLORIDE mmol/L 98*   CO2 mmol/L 27   BUN mg/dL 19   CREATININE mg/dL 1 40*   ANION GAP mmol/L 8   CALCIUM mg/dL 7 7*   GLUCOSE RANDOM mg/dL 198*             Lab Results   Component Value Date/Time    HGBA1C 6 5 (H) 12/08/2020 09:23 AM               Imaging: I have personally reviewed pertinent reports  XR hip/pelv 1 vw right if performed   Final Result by Homero Chamorro MD (01/04 4890)   Radiographically typical appearing revised total right hip arthroplasty      No acute osseous abnormality  Workstation performed: [id number]             EKG, Pathology, and Other Studies Reviewed on Admission:   · EKG: from 12/11/2020: NSR, 87 BPM - repeat EKG pending    ** Please Note: This note has been constructed using a voice recognition system   **

## 2021-01-05 NOTE — ASSESSMENT & PLAN NOTE
Patient noted to have tachycardia ranging from 100-110's in post-op period  RRR on exam, EKG pending  Patient denies any chest pain, palpitations, SOB  Patient remains afebrile    AM labs pending  Continue to monitor fever curve during admission  AM labs showed leukocytosis - cultures, lactic pending; doubt infectious source at this time, suspect reactive WBC in post-op period, but given SIRS criteria will r/o infectious etiology  PRN pain medication per primary team

## 2021-01-05 NOTE — PHYSICAL THERAPY NOTE
PT TREATMENT     01/05/21 1035   Note Type   Note Type BID visit/treatment   Pain Assessment   Pain Assessment Tool 0-10   Pain Score 3  (R hip area)   Restrictions/Precautions   RLE Weight Bearing Per Order WBAT   Braces or Orthoses   (abduction pillow)   Other Precautions Pain; Fall Risk   General   Chart Reviewed Yes   Family/Caregiver Present No   Cognition   Arousal/Participation Cooperative   Subjective   Subjective patient states having had pain in hip for years   Transfers   Sit to Stand 7  Independent   Stand to Sit 7  Independent   Additional Comments patient stood in bathroom for urination, independently   Ambulation/Elevation   Gait Assistance 5  Supervision   Additional items Verbal cues  (cuing for gait patterning)   Assistive Device Rolling walker   Distance 150 feet with change in direction, antalgic gait patterning at times and improved with gait endurance   Exercises   Balance training  sidestepping and backward walking completed for balance and strengthening   Assessment   Assessment patient cooperative and motivated and will benefit from continued PT with progression upon discharge from outpatient PT   Plan   Treatment/Interventions ADL retraining;Functional transfer training;LE strengthening/ROM; Elevations; Therapeutic exercise; Endurance training;Gait training   PT Frequency Twice a day   Recommendation   PT Discharge Recommendation Other (Comment)  (out patient PT)   Licensure   [place] License Number  Pinky Michael PT [id number]

## 2021-01-05 NOTE — ASSESSMENT & PLAN NOTE
Lab Results   Component Value Date    HGBA1C 6 5 (H) 12/08/2020       No results for input(s): POCGLU in the last 72 hours      Blood Sugar Average: Last 72 hrs:     Hold home dose of metformin during admission  Add SSI coverage with meals

## 2021-01-05 NOTE — ASSESSMENT & PLAN NOTE
Patient is POD #0 from revision of total R hip arthroplasty  Tolerated the procedure well, working with PT this afternoon and has been up and OOB, ambulating in the hallway with the use of walker with minimal discomfort    Plan per primary team  Continue  PO BID, SCDs for DVT prophylaxis  PT/OT

## 2021-01-05 NOTE — PHYSICAL THERAPY NOTE
PT TREATMENT     01/05/21 1353   PT Last Visit   PT Visit Date 01/05/21   Note Type   Note Type BID visit/treatment   Pain Assessment   Pain Assessment Tool 0-10   Pain Score 4   Pain Location/Orientation Orientation: Right;Location: Hip   Restrictions/Precautions   RLE Weight Bearing Per Order WBAT   Braces or Orthoses   (abduction pillow)   Other Precautions Fall Risk;Pain  (hip flex < 90 ; no hip adduction, no hip IR)   General   Chart Reviewed Yes   Family/Caregiver Present No   Cognition   Overall Cognitive Status WFL   Arousal/Participation Cooperative   Attention Within functional limits   Orientation Level Oriented X4   Following Commands Follows all commands and directions without difficulty   Subjective   Subjective Pt states that he is ready to go home but they are waiting on his kidney levels to normalize   Bed Mobility   Additional Comments presents in recliner chair   Transfers   Sit to Stand 7  Independent   Stand to Sit 7  Independent   Ambulation/Elevation   Gait pattern Antalgic; Step to   Gait Assistance 6  Modified independent   Additional items Verbal cues  (to keep RW closer to him )   Assistive Device Rolling walker   Distance 200 feet with change in direction   Balance   Static Sitting Good   Dynamic Sitting Fair +   Static Standing Fair +  (with RW)   Dynamic Standing Fair +  (with RW)   Ambulatory Fair +  (with RW)   Endurance Deficit   Endurance Deficit No   Activity Tolerance   Activity Tolerance Patient tolerated treatment well   Nurse Made Aware yes: [place] can re attach IV fluids   Exercises   Quad Sets Sitting;10 reps;Right   Knee AROM Long Arc Quad Sitting;10 reps;Bilateral   Ankle Pumps Sitting;20 reps;Bilateral   Balance training  turns with RW: not pivoting on RLE: take small continuous steps   Assessment   Prognosis Good   Problem List Decreased strength; Impaired balance;Decreased mobility; Decreased skin integrity;Pain;Orthopedic restrictions   Assessment Progressing well with all functional mobility  OK from a PT standpoint for D/C  Cont  until d/c  Goals   Patient Goals to go home   Plan   Treatment/Interventions ADL retraining;Functional transfer training;LE strengthening/ROM; Elevations; Therapeutic exercise; Endurance training;Patient/family training;Equipment eval/education; Bed mobility;Gait training;Spoke to nursing;Spoke to case management;OT   Progress Progressing toward goals   PT Frequency Twice a day   Recommendation   PT Discharge Recommendation Other (Comment)  (home with OPPT)   Licensure   [place] License Number  Radha lopez PT  [id number]

## 2021-01-05 NOTE — DISCHARGE INSTRUCTIONS
Minimally Invasive Total Hip Replacement   WHAT YOU SHOULD KNOW:   Minimally invasive total hip replacement is surgery to replace a damaged hip joint with an implant  AFTER YOU LEAVE:     Medicines:   · Anticoagulants    are a type of blood thinner medicine that helps prevent clots  Clots can cause strokes, heart attacks, and death  These medicines may cause you to bleed or bruise more easily  You will be on full-dose aspirin 325mg twice a day for 6 weeks after surgery    ¨ Watch for bleeding from your gums or nose  Watch for blood in your urine and bowel movements  Use a soft washcloth and a soft toothbrush  If you shave, use an electric razor  Avoid activities that can cause bruising or bleeding  ¨ Tell your healthcare provider about all medicines you take because many medicines cannot be used with anticoagulants  Do not start or stop any medicines unless your healthcare provider tells you to  Tell your dentist and other healthcare providers that you take anticoagulants  Wear a bracelet or necklace that says you take this medicine  ¨ You will need regular blood tests so your healthcare provider can decide how much medicine you need  Take anticoagulants exactly as directed  Tell your healthcare provider right away if you forget to take the medicine, or if you take too much  · Prescription pain medicine : You will be given oxycodone 5 mg to take 1-2 tablets every 4-6 hours as needed for pain  We also recommend you take Tylenol 975 mg 3 times daily around the clock for baseline pain control    · Stool softeners  make it easier for you to have a bowel movement  You may need this medicine to treat or prevent constipation  He will be prescribed Colace 100 mg to take twice daily as needed for constipation  This will be important all your taking the narcotic pain medication    · Take your medicine as directed    Contact your PHP if you think your medicine is not helping or if you have side effects  Tell him if you are allergic to any medicine  Keep a list of the medicines, vitamins, and herbs you take  Include the amounts, and when and why you take them  Bring the list or the pill bottles to follow-up visits  Carry your medicine list with you in case of an emergency  Follow up with your orthopedist as directed: You may need to return to have your wound checked and stitches or staples removed  Write down your questions so you remember to ask them during your visits  Please schedule a follow-up appointment with Dr Nuha Lares for 2 weeks after surgery  Physical therapy:  A physical therapist teaches you exercises to help improve movement and strength, and to decrease pain  Self-care:   · Use a cane, walker, or crutches as directed  These devices will help decrease your risk of falling  · Wear pressure stockings  These are long, tight stockings that put pressure on your legs to promote blood flow and prevent clots and decrease swelling  · Keep your knees apart  Place a pillow or wedge between your knees when you sit or lie down  This helps support your hip  · Prevent dislocation of your hip implant:      ¨ Do not lean forward when you are in bed or sit up with your legs straight out in front of you  ¨ Do not  sit on a low chair  Use armrests when you rise from a sitting position to decrease the force and pressure on your hips  ¨ Do not cross your legs  ¨ Lift objects with your knees bent rather than straight  Contact your orthopedist if:  · You have a fever over 101 0°F     · You have trouble moving or bending your joint  · You have increased pain and swelling in your joint, even after you take pain medicine  · You have back pain or lower leg pain when you bend your foot upwards  · You have questions or concerns about your condition or care  Seek immediate care or call 911 if:   · You feel like you are going to faint      · Blood soaks through/saturates your bandage  · Your incision comes apart  · Your incision is red, swollen, or draining pus  · You cannot walk or move your joint, or the limb is numb  · Your arm or leg feels warm, tender, and painful  It may look swollen and red  · You have a seizure or feel confused  · You feel lightheaded, short of breath, and have chest pain  · You have chest pain when you take a deep breath or cough  You may cough up blood  © 2014 [address] is for End User's use only and may not be sold, redistributed or otherwise used for commercial purposes  All illustrations and images included in CareNotes® are the copyrighted property of A [employer] M [employer]  or Magdy Henry  The above information is an  only  It is not intended as medical advice for individual conditions or treatments  Talk to your doctor, nurse or pharmacist before following any medical regimen to see if it is safe and effective for you

## 2021-01-05 NOTE — CASE MANAGEMENT
Per chart review and rounds with PTOT pt is reported to be independent with no apparent discharge needs at this time  Pt will be returning home and starting outpatient therapy  No DME needs per therapy

## 2021-01-05 NOTE — NURSING NOTE
Patient discharged via wheelchair accompanied by PCA to home  IV taken out  All discharge instructions gone over with patient, all belongings left with patient as well  Prescriptions sent to pharmacy from MD  Abductor pillow sent with patient home to use at night time  All questions and concerns answered

## 2021-01-05 NOTE — UTILIZATION REVIEW
Initial Clinical Review    Elective  surgical procedure  Age/Sex: 48 y o  male  Surgery Date: 1/4/21  Procedure: ARTHROPLASTY HIP TOTAL REVISION - RIGHT (Right)     Anesthesia: General  Operative Findings: Intraoperatively native tissue was preserved down to the capsule of the right hip joint  Once the capsulotomy was performed there was evidence of significant scarring within the capsule that had to be debulked in order to mobilize the hip and dislocate the hip  There was reactive bone that had grown over the top part of the prosthesis and collar  After the reactive bone was removed from the proximal perimeter of the implant the implant demonstrated that it was grossly loose to manual manipulation  The stem was removed without complication without difficulty  The acetabular component was evaluated and tested and appeared to be stable  The polyethylene liner was examined and was without significant signs of wear, scratch, or aberration  Revision femoral arthroplasty was then pursued  A modular, tapered, Golden implant was successfully implanted into the femur  Of note preparation of the femoral canal was quite difficult due to the native host bone that was incredibly dense  Samples of intramedullary reaming were sent to pathology for evaluation  Ultimately the final construct demonstrated restoration of appropriate head center height, stable fixation within the femur, and excellent stability in the position of sleep, hip flexion to 90° and 70° of internal rotation of the femur, and leg lengths that appeared to be equal intraoperatively  The patient tolerated procedure well  There were no complications    POD#1 Progress Note:   1) POD#1 s/p Revision Right CELENA- one component  Plan:  Ancef 3 g IV x 2 for 24 hours postop - completed  DVT prophylaxis:  Aspirin 325 PO BID/SCD's/Ambulation  PT/OT- WBAT RLE  Posterior hip precautions x 6 weeks  Analgesia PRN  Follow up AM labs - gfr below 60/Cr elevated 1 4, will dc NSAID and continue gentle hydration with IV fluids; recheck BMP this afternoon; H&H stable  Pineda DC - monitor for void  Consult to SLIM - initially for tachycardia and low oxygen saturation, those issues seem to have resolved  I discontinued the workup for infection, as the leukocytosis is minimal in nature and expected postoperatively after a large revision type surgery  We do not have any concern for SIRS or infectious etiology  However, patient has elevated creatinine, some medicine include the appreciated  NSAIDs, hydrochlorothiazide, and losartan have already been held  Dressing- monitor for drainage, Mepilex may remain in place 7 days  Discharge planning - disposition pending progress with PT and recheck BMP  Will plan forward discharged to home today or tomorrow    Admission Orders: Date/Time/Statement:   Admission Orders (From admission, onward)     Ordered        01/04/21 1456  Inpatient Admission  Once                   Orders Placed This Encounter   Procedures    Inpatient Admission     Standing Status:   Standing     Number of Occurrences:   1     Order Specific Question:   Admitting Physician     Answer:   Camila Mckeon     Order Specific Question:   Level of Care     Answer:   Med Surg [16]     Order Specific Question:   Estimated length of stay     Answer:   Inpatient Only Surgery     Vital Signs: /79 (BP Location: Right arm)   Pulse 95   Temp 98 1 °F (36 7 °C) (Oral)   Resp 17   Ht 5' 11" (1 803 m)   Wt 125 kg (276 lb 3 8 oz)   SpO2 95%   BMI 38 53 kg/m²   Diet: cardio 2gm na  Mobility: pt/ot   DVT Prophylaxis: asa bid  Medications/Pain Control: oxycodone  Scheduled Medications:  acetaminophen, 975 mg, Oral, Q8H  aspirin, 325 mg, Oral, BID  dexamethasone, 10 mg, Intravenous, Once  docusate sodium, 100 mg, Oral, BID  gabapentin, 200 mg, Oral, BID  insulin lispro, 1-6 Units, Subcutaneous, TID AC  insulin lispro, 1-6 Units, Subcutaneous, HS  melatonin, 3 mg, Oral, HS  pantoprazole, 40 mg, Oral, Daily  pravastatin, 80 mg, Oral, Daily With Dinner  scopolamine, 1 patch, Transdermal, Once      Continuous IV Infusions:  sodium chloride, 125 mL/hr, Intravenous, Continuous      PRN Meds:  aluminum-magnesium hydroxide-simethicone, 30 mL, Oral, Q6H PRN  HYDROmorphone, 0 5 mg, Intravenous, Q2H PRN  lactated ringers, 1,000 mL, Intravenous, Once PRN    And  lactated ringers, 1,000 mL, Intravenous, Once PRN  magnesium hydroxide, 30 mL, Oral, Daily PRN  ondansetron, 4 mg, Intravenous, Q6H PRN  oxyCODONE, 10 mg, Oral, Q4H PRN  oxyCODONE, 5 mg, Oral, Q4H PRN  sodium chloride, 1,000 mL, Intravenous, Once PRN    And  sodium chloride, 1,000 mL, Intravenous, Once PRN        Network Utilization Review Department  ATTENTION: Please call with any questions or concerns to [phone] and carefully listen to the prompts so that you are directed to the right person  All voicemails are confidential   Byron Saldana all requests for admission clinical reviews, approved or denied determinations and any other requests to dedicated fax number below belonging to the campus where the patient is receiving treatment   List of dedicated fax numbers for the Facilities:  [address] DENIALS (Administrative/Medical Necessity) [phone]   [address] (Maternity/NICU/Pediatrics) [phone] [address] Tosha Méndez 1198 (  Eugenio Webb "Meagan" 103) [address] [place] [address] Sayda Shepard 14[address] [facility]) [address] [facility] [place] [address] [phone]

## 2021-01-05 NOTE — ASSESSMENT & PLAN NOTE
Losartan and hydrochlorothiazide have been on hold due to acute kidney injury  Can resume medications if creatinine improves

## 2021-01-05 NOTE — ASSESSMENT & PLAN NOTE
Hg 11 0 on AM labs   Previous labs from 1 month ago with Hg of 15 4  Continue to monitor, patient denies any sx of blood loss and is otherwise asymptoamtic

## 2021-01-05 NOTE — OCCUPATIONAL THERAPY NOTE
Occupational Therapy Evaluation/Treatment       01/05/21 0930   OT Last Visit   OT Visit Date 01/05/21   Note Type   Note type Evaluation   Restrictions/Precautions   RLE Weight Bearing Per Order WBAT   Braces or Orthoses   (abduction pillow)   Other Precautions Fall Risk;Pain  (posterior hip precautions )   Pain Assessment   Pain Assessment Tool 0-10   Pain Score 2   Pain Location/Orientation Orientation: Right;Location: Hip   Home Living   Type of [address] [place] [address] Two level;1/2 bath on main level;Bed/bath upstairs; Able to live on main level with bedroom/bathroom  (1+1 CHAITANYA)   Bathroom Shower/Tub Tub/shower unit   [employer] Raised  (raised upstairs, has a commode and raiser if needed )   [address] chair   Home Equipment Walker;Cane   Prior Function   Level of [place] Independent with ADLs and functional mobility   Lives With Marueen Help From Family   ADL Assistance Independent   IADLs Independent   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[employer] )   ADL   Eating Assistance 7  Independent   Grooming Assistance 5  Supervision/Setup   Grooming Deficit Standing with assistive device; Wash/dry hands   UB Bathing Assistance 7  Independent   LB Bathing Assistance 4  Minimal Assistance   [facility] 4  Minimal Assistance   Toileting Assistance  5  Supervision/Setup   Bed Mobility   Supine to Sit 5  Supervision   Additional items Verbal cues   Transfers   Sit to Stand 5  Supervision   Stand to Sit 5  Supervision   Stand pivot 5  Supervision   Functional Mobility   Functional Mobility 5  Supervision   Additional Comments 15 feet   Additional items Rolling walker   Balance   Static Sitting Good   Dynamic Sitting Fair +   Static Standing Fair +   Dynamic Standing Fair   Activity Tolerance   Activity Tolerance Patient tolerated treatment well   Nurse Made Aware yes   RUE Assessment   RUE Assessment WNL   LUE Assessment   LUE Assessment WNL Cognition   Overall Cognitive Status WFL   Arousal/Participation Cooperative   Attention Within functional limits   Orientation Level Oriented X4   Following Commands Follows all commands and directions without difficulty   Assessment   Limitation Decreased ADL status; Decreased Safe judgement during ADL;Decreased endurance;Decreased self-care trans;Decreased high-level ADLs  (decreased balance and mobility )   Prognosis Good   Assessment Patient evaluated by Occupational Therapy  Patient admitted with S/P revision of total hip  The patients occupational profile, medical and therapy history includes a extensive additional review of physical, cognitive, or psychosocial history related to current functional performance  Comorbidities affecting functional mobility and ADLS include: chronic pain, diabetes, hypertension and CELENA  Prior to admission, patient was independent with functional mobility without assistive device, independent with ADLS and independent with IADLS  The evaluation identifies the following performance deficits: weakness, impaired balance, decreased endurance, increased fall risk, new onset of impairment of functional mobility, decreased ADLS, decreased IADLS, pain, decreased activity tolerance, decreased safety awareness, impaired judgement, ortheopedic restrictions and decreased strength, that result in activity limitations and/or participation restrictions  This evaluation requires clinical decision making of high complexity, because the patient presents with comorbidites that affect occupational performance and required significant modification of tasks or assistance with consideration of multiple treatment options  The Barthel Index was used as a functional outcome tool presenting with a score of 55, indicating marked limitations of functional mobility and ADLS    Patient will benefit from skilled Occupational Therapy services to address above deficits and facilitate a safe return to prior level of function  Goals   Patient Goals walk without pain    STG Time Frame   (1-7 days)   Short Term Goal  Goals established to promote patient goal of walk without pain:  Patient will increase standing tolerance to 5 minutes during ADL task to decrease assistance level and decrease fall risk; Patient will increase bed mobility to independent in preparation for ADLS and transfers; Patient will increase functional mobility to and from bathroom with rolling walker independently to increase performance with ADLS and to use a toilet;  Patient will improve functional activity tolerance to 10 minutes of sustained functional tasks to increase participation in basic self-care and decrease assistance level;    Patient will increase dynamic standing balance to fair+ to improve postural stability and decrease fall risk during standing ADLS and transfers  LTG Time Frame   (8-14 days)   Long Term Goal Goals established to promote patient goal of walk without pain:  Patient will increase standing tolerance to 10 minutes during ADL task to decrease assistance level and decrease fall risk;  Patient will improve functional activity tolerance to 20 minutes of sustained functional tasks to increase participation in basic self-care and decrease assistance level;    Patient will increase dynamic standing balance to good to improve postural stability and decrease fall risk during standing ADLS and transfers  Functional Transfer Goals   Pt Will Perform All Functional Transfers   (STG independent )   ADL Goals   Pt Will Perform Grooming Standing at sink  (STG independent )   Pt Will Perform Bathing   (STG supervision LTG Independent )   Pt Will Perform LE Dressing   (STG supervision LTG Independent )   Pt Will Perform Toileting   (STG independent )   Plan   Treatment Interventions ADL retraining;Functional transfer training; Endurance training;Patient/family training;Equipment evaluation/education; Activityengagement; Compensatory technique education   Goal Expiration Date 01/19/21   OT Frequency 5x/wk   Additional Treatment Session   Start Time 0912   End Time 0930   Treatment Assessment Patient instructed on total hip precautions  Patient able to verbalized and demonstrate understanding  Patient independently donned shirt, donned underwear and pants with supervision with reacher,  doffed/donned socks with reacher and sockaid independently seated  Patient donned slip on shoes independently  Patient reports his wife will assist with socks, has a reacher, does not want a sock-aid but aware how to get one and verbalized understanding  Patient completed toilet transfer from standard toilet with grab bar use with supervision  Patient stood to wash hands at sink with supervision  Functional mobility 15 feet with RW supervision  Patient verbalized understanding on car transfers  Patient tolerated well  Cooperative and motivated  Nurse aware       Recommendation   OT Discharge Recommendation Return to previous environment with social support   Barthel Index   Feeding 10   Bathing 0   Grooming Score 5   Dressing Score 5   Bladder Score 10   Bowels Score 10   Toilet Use Score 5   Transfers (Bed/Chair) Score 10   Mobility (Level Surface) Score 0   Stairs Score 0   Barthel Index Score 54   Licensure   [place] License Number  Macho Joseph Jesus Kevin 87 OTR/L [id number]

## 2021-01-05 NOTE — PROGRESS NOTES
Progress Note - Orthopedics   Rach Cheung 48 y o  male MRN: [id number]  Unit/Bed#: [address] [place] [address] Encounter: [id number]    Assessment:  1) POD#1 s/p Revision Right CELENA- one component    Plan:  Ancef 3 g IV x 2 for 24 hours postop - completed  DVT prophylaxis:  Aspirin 325 PO BID/SCD's/Ambulation  PT/OT- WBAT RLE  Posterior hip precautions x 6 weeks  Analgesia PRN  Follow up AM labs - gfr below 60/Cr elevated 1 4, will dc NSAID and continue gentle hydration with IV fluids; recheck BMP this afternoon; H&H stable  Pineda DC - monitor for void  Consult to SLIM - initially for tachycardia and low oxygen saturation, those issues seem to have resolved  I discontinued the workup for infection, as the leukocytosis is minimal in nature and expected postoperatively after a large revision type surgery  We do not have any concern for SIRS or infectious etiology  However, patient has elevated creatinine, some medicine include the appreciated  NSAIDs, hydrochlorothiazide, and losartan have already been held  Dressing- monitor for drainage, Mepilex may remain in place 7 days  Discharge planning - disposition pending progress with PT and recheck BMP  Will plan forward discharged to home today or tomorrow  Patient scheduled to start outpatient physical therapy later this week  Weight bearing: WBAT RLE    VTE Pharmacologic Prophylaxis: ASA 325mg PO BID  VTE Mechanical Prophylaxis: sequential compression device    Subjective:  Patient seen and examined in bed this morning  Pain controlled  Patient developed tachycardia and decreased oxygen saturation last night  A stat medicine consult was obtained of along with an EKG  He feels better this morning  He was able to ambulate in the hallway with physical therapy yesterday  Denies CP, SOB, n/v, f/c, dizziness, lightheadedness, or parasthesias    Vitals: Blood pressure 139/79, pulse 95, temperature 98 1 °F (36 7 °C), temperature source Oral, resp   rate 17, height 5' 11" (1 803 m), weight 125 kg (276 lb 3 8 oz), SpO2 95 %  ,Body mass index is 38 53 kg/m²        Intake/Output Summary (Last 24 hours) at 1/5/2021 0844  Last data filed at 1/5/2021 0603  Gross per 24 hour   Intake 2491 67 ml   Output 2200 ml   Net 291 67 ml       Invasive Devices     Peripheral Intravenous Line            Peripheral IV 01/04/21 Right Hand 1 day                Physical Exam: NAD, A&Ox3, resting comfortably in bed  Ortho Exam: RLE:  Posterolateral right hip Dsg c/d/i, thigh soft, abduction pillow in place, +DF/PF/EHL, +L3-S1 SILT, DP2+, foot warm, leg lengths appear equal, SCDs in place    Lab, Imaging and other studies: PO XR R hip:  Reviewed and acceptable    Lab Results   Component Value Date    WBC 13 48 (H) 01/05/2021    HGB 11 0 (L) 01/05/2021    HCT 33 6 (L) 01/05/2021    MCV 93 01/05/2021     01/05/2021     Lab Results   Component Value Date    SODIUM 133 (L) 01/05/2021    K 3 8 01/05/2021    CL 98 (L) 01/05/2021    CO2 27 01/05/2021    AGAP 8 01/05/2021    BUN 19 01/05/2021    CREATININE 1 40 (H) 01/05/2021    GLUC 198 (H) 01/05/2021    GLUF 126 (H) 12/08/2020    CALCIUM 7 7 (L) 01/05/2021    AST 29 12/08/2020    ALT 65 12/08/2020    ALKPHOS 77 12/08/2020    TP 7 3 12/08/2020    TBILI 0 40 12/08/2020    EGFR 57 01/05/2021     Nathan Hernandez PA-C

## 2021-01-06 ENCOUNTER — TELEPHONE (OUTPATIENT)
Dept: RHEUMATOLOGY | Facility: CLINIC | Age: 56
End: 2021-01-06

## 2021-01-06 VITALS
WEIGHT: 276.24 LBS | HEART RATE: 99 BPM | TEMPERATURE: 99.2 F | DIASTOLIC BLOOD PRESSURE: 96 MMHG | OXYGEN SATURATION: 94 % | HEIGHT: 71 IN | RESPIRATION RATE: 18 BRPM | BODY MASS INDEX: 38.67 KG/M2 | SYSTOLIC BLOOD PRESSURE: 158 MMHG

## 2021-01-06 NOTE — UTILIZATION REVIEW
Notification of Discharge  This is a Notification of Discharge from our facility [address]  Please be advised that this patient has been discharge from our facility  Below you will find the admission and discharge date and time including the patients disposition  PRESENTATION DATE: 1/4/2021  7:26 AM  OBS ADMISSION DATE:   IP ADMISSION DATE: 1/4/21 1456   DISCHARGE DATE: 1/5/2021  4:42 PM  DISPOSITION: Home/Self Care Home/Self Care   Admission Orders listed below:  Admission Orders (From admission, onward)     Ordered        01/04/21 1456  Inpatient Admission  Once                   Please contact the UR Department if additional information is required to close this patient's authorization/case  Catrina [facility] Utilization Review Department  Main: [phone] x carefully listen to the prompts  All voicemails are confidential   [email or web address]  Send all requests for admission clinical reviews, approved or denied determinations and any other requests to dedicated fax number below belonging to the campus where the patient is receiving treatment   List of dedicated fax numbers:  [address] DENIALS (Administrative/Medical Necessity) [phone]   [address] (Maternity/NICU/Pediatrics) [phone] 54[address] [facility] [phone]     [address] [phone]   Juan Kim [phone]   Atif Mishra [facility] [address] [place] [address] [phone]   [facility]  [phone]   [address] [phone]

## 2021-01-07 ENCOUNTER — TELEPHONE (OUTPATIENT)
Dept: OBGYN CLINIC | Facility: HOSPITAL | Age: 56
End: 2021-01-07

## 2021-01-07 ENCOUNTER — OFFICE VISIT (OUTPATIENT)
Dept: PHYSICAL THERAPY | Facility: CLINIC | Age: 56
End: 2021-01-07
Payer: COMMERCIAL

## 2021-01-07 DIAGNOSIS — M25.551 RIGHT HIP PAIN: ICD-10-CM

## 2021-01-07 DIAGNOSIS — Z96.641 HISTORY OF TOTAL RIGHT HIP ARTHROPLASTY: ICD-10-CM

## 2021-01-07 DIAGNOSIS — T84.038D LOOSENING OF PROSTHETIC HIP, SUBSEQUENT ENCOUNTER: Primary | ICD-10-CM

## 2021-01-07 DIAGNOSIS — Z96.649 S/P REVISION OF TOTAL HIP: ICD-10-CM

## 2021-01-07 DIAGNOSIS — Z96.649 LOOSENING OF PROSTHETIC HIP, SUBSEQUENT ENCOUNTER: Primary | ICD-10-CM

## 2021-01-07 PROCEDURE — 97110 THERAPEUTIC EXERCISES: CPT | Performed by: PHYSICAL THERAPIST

## 2021-01-07 PROCEDURE — 97112 NEUROMUSCULAR REEDUCATION: CPT | Performed by: PHYSICAL THERAPIST

## 2021-01-07 PROCEDURE — 97164 PT RE-EVAL EST PLAN CARE: CPT | Performed by: PHYSICAL THERAPIST

## 2021-01-07 NOTE — PROGRESS NOTES
PT Re-Evaluation     Today's date: 2021  Patient name: Sukhi German  : 1965  MRN: [id number]  Referring provider: Concha Torres DO  Dx:   Encounter Diagnosis     ICD-10-CM    1  Loosening of prosthetic hip, subsequent encounter  T84 038D     Z96 649    2  Right hip pain  M25 551    3  History of total right hip arthroplasty  Z96 641    4  S/P revision of total hip  Z96 649                      Assessment  Assessment details: 2020: Sukhi German is a 48 y o  male who presents with pain, decreased strength, decreased ROM, decreased joint mobility and ambulatory dysfunction  Due to these impairments, patient has difficulty performing ADL's, recreational activities, work-related activities, engaging in social activities, ambulation, stair negotiation, lifting/carrying, transfers  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, he owns a [id number]  Patient's clinical presentation is consistent with their referring diagnosis of Loosening of prosthetic hip, subsequent encounter, Right hip pain, History of total right hip arthroplasty which is in need of revision  Plan: Ambulatory referral to Physical Therapy    Pre-op exam  Plan: Ambulatory referral to Physical Therapy  Patient has been educated in post-op contraindications / precautions, wound care, gait training, weight bearing status, home exercise program and plan of care  Patient would benefit from skilled physical therapy services to address their aforementioned functional limitations and progress towards prior level of function and independence with home exercise program      2021: Pt presents for first post-op visit w/ mild to moderate pain controlled w/ medication  He ambulates safely w/ RW  He presents w/ expected ROM and strength limitations  Redness around incision which reportedly existed pre-op  Pt feels like R leg is now longer, and was advised this feeling will likely resolve  He is an excellent candidate for PT   Continue PT per POC     Impairments: abnormal gait, abnormal or restricted ROM, activity intolerance, impaired balance, impaired physical strength, lacks appropriate home exercise program, pain with function, weight-bearing intolerance and poor body mechanics  Functional limitations: ambulates w/ RW; not driving; non-recip stairs, min difficulty w/ bed mobility and transfersUnderstanding of Dx/Px/POC: good   Prognosis: good    Goals  Short Term Goals to be accomplished in 4 weeks: (2/4/2020)  STG1: Pt will be I with HEP and understanding CELENA precautions - met  STG2: Pt will demo 50% improvement in hip AROM  STG3: Pt will demo 1/2 MMT strength inc hip  STG4: Pt will demo ease w/ bed mobility  STG5: Pt to advance from ambulation w/ RW to [facility] or  AD level surfaces  Long Term Goals to be accomplished in 12 weeks: (4/1/2021)   LTG1: Pt will achieve full hip AROM to allow reciprocal stairs and ease w/ LE dressing  LTG2: Pt will demo hip strength 4+/5 or better to allow SLS x 30"  LTG3: Pt will return to work, and will need to be able to ambulate on uneven terrain w/o AD to do so  LTG4: Reduce pain to 0-2/10 w/ activity  LTG5: Pt to demonstrate sit to stand transfers w/o UE assist to facilitate ease w/ squatting          Plan  Plan details:       Patient would benefit from: PT eval and skilled physical therapy  Planned modality interventions: cryotherapy, thermotherapy: hydrocollator packs and unattended electrical stimulation  Planned therapy interventions: manual therapy, neuromuscular re-education, therapeutic activities, therapeutic exercise, home exercise program, stretching, patient education, postural training, balance/weight bearing training and functional ROM exercises  Frequency: 2x week  Plan of Care beginning date: 12/28/2020  Plan of Care expiration date: 4/1/2021  Treatment plan discussed with: patient        Subjective Evaluation    History of Present Illness  Mechanism of injury: 12/28/2020: Pt states his original CELENA was 2015 and never felt right  He went for a second opinion and he found out the prosthesis is loose and needs to be replaced  He is having a revision on 2021  He reports near constant pain and his function is significantly limited  He states it feels like he can feel the prosthetic loosely moving in his hip  CELENA revision planned for 2021: Pt reports his post-op pain is managed pretty well if he takes his medications consistently  He feels like his Right leg is longer than his left  Pt reports redness at R hip was present pre-op, is B/L and attributes it to wearing jeans all the time  States MD is aware of redness  Pain  At best pain ratin  At worst pain ratin  Pain location: R lateral/posterior hip and thigh  Alleviating factors: nothing  Exacerbated by: weight bearing and after prolonged positions    Progression: worsening    Social Support  Steps to enter house: yes (1+1 w/ post, no rail)  Stairs in house: yes (w/ rail on left then on right to ascend)   Lives with: spouse and adult children    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[employer])    Diagnostic Tests  X-ray: abnormal  Treatments  Current treatment: physical therapy  Patient Goals  Patient goals for therapy: decreased pain, return to sport/leisure activities and return to work  Patient goal: weight bear w/o pain        Objective    Flowsheet Rows      Most Recent Value   PT/OT G-Codes   Current Score  39   Projected Score  62       A/PROM:  R  R  L     2020  Hip flex sup  35*  80*  100  Hip abd sup  30  32*  32  Hip ER prone  5 sit  50  50  Hip IR prone  NT  20**  30    MMT:    R  R  L     2020  Hip flex   3/5  4+/5*  5/5  Hip abd  2+/5  4+/5  5/5  Hip ER   3/5  3+/5*  5/5  Hip IR   3+/5  4/5  5/5  Hip ext   3-/5  5/5  5/5  Knee flexion  4/5  5/5  5/5  Knee exten  4/5  5/5  5/5      Balance:   2020  Tandem R post w/ EO:  30 sec (+) 30 sec (+)  Tandem L post w/ EO:  30 sec (+) 30 sec (+)  SLS R w/ EO:    unable  5 sec*  SLS L w/ EO:   20 sec  20 sec    Function: ambulates w/ RW; min difficulty w/ bed mobility and sit to stand; NT squatting today  2020- poor squat form w/ pain, pain upon standing after sitting,     TU second 2021 w/ RW   16 seconds 2020 w/o AD    Gait: mildly antagic w/ toe out no AD    Palpation: No reported glut tenderness; (-) Homen's  Post-op dressing in place  Redness in surrounding tissue into gluts, iliac crest - pt states this was present pre-op and MD is aware  2020 - TTP R lat gluts     Flexibility: mod quintanilla B/L hams, pirif/hip flexor mod/bharti quintanilla R  Precautions: CELENA precautions (posterior),   SOC: 2020  POC expiration:   FOTO: to be completed at post-op visit   Daily Treatment log:  Date 2020      Visit # 1 pre-op 1- post op      Manual  5'      Hip abd PROM & supported hip flexor str off edge  5'                      There Exer  20'      CP instruction TT        total joint HEP  Review/issued 15'       DVT/infect Prevention rev       virt home asses Pt forgot - reviewed verbally       Home prep checklist TT       Stand march @ rail  1x10 R/L      Stand hip abd @ rail  1x10 R/L      Stand HR/TR        hooklying hip ER  5"x10      SLR flex  1x5      LAQ w/ add  3# 1x15 R/L      Hip flexor stretch supine off edge  1'x2 supported      There activ  Stair training TT       Car transfer TT                       HEP        NMRed  15'      bridges  5"x10      Tandem balance  30"x1 R/L      Clamshells w/ pillow  Assisted 2x5      Stand quad set w/ ball at wall  5"x10                                              Modalities  5'      CP to end  5'                        Reviewed cancellation and no-show policy w/ patient  Pt expresses understanding future appointments will be removed if one appointment is missed as a no-show or if there are two last minute cancellations   The option of a virtual visit was explained to help avoid missing a session

## 2021-01-07 NOTE — TELEPHONE ENCOUNTER
Pt contacted to complete a postoperative follow up call assessment  No answer from patient at this time, VM left requesting a call back

## 2021-01-08 ENCOUNTER — TELEPHONE (OUTPATIENT)
Dept: OBGYN CLINIC | Facility: HOSPITAL | Age: 56
End: 2021-01-08

## 2021-01-08 NOTE — TELEPHONE ENCOUNTER
Pt contacted to complete a postoperative follow up call assessment  Pt reports doing "awesome" since being home  Pt reports current 1/10 pain  His AVS and AVS medication list were reviewed  He reports taking tylenol 1000mg TID, and oxycodone 5mg TID for pain at this time  He is also taking his ASA 325mg BID to prevent postop blood clots  Pt reports he is trying to "take minimal" medications regarding his pain control with TYlenol and Oxycodone  Pt also confirmed taking Colace BID yesterday, also taking a miralax to assist      Pt reports swelling is "swollen" but that he admitted to needing to ice "more " We discussed icing 20 minutes on every 2-3 hours for swelling control  He reports his dressing is dry and denies any drainage  Pt had outpatient PT yesterday and reports it was "real good " He has been ambulating with the RW and denies any issues or complaints  He reports having 19 steps to get upstairs and denies once sleeping on the entry level, and being able to do the stairs  Pt denies any chest pain, SOB, dizziness, fever or calf pain  He denies any questions or concerns at this time  He was advised to contact us with any issues, and he is aware of his f/u with the surgeon on 1/20

## 2021-01-11 ENCOUNTER — OFFICE VISIT (OUTPATIENT)
Dept: PHYSICAL THERAPY | Facility: CLINIC | Age: 56
End: 2021-01-11
Payer: COMMERCIAL

## 2021-01-11 DIAGNOSIS — Z96.649 LOOSENING OF PROSTHETIC HIP, SUBSEQUENT ENCOUNTER: ICD-10-CM

## 2021-01-11 DIAGNOSIS — M25.551 RIGHT HIP PAIN: ICD-10-CM

## 2021-01-11 DIAGNOSIS — T84.038D LOOSENING OF PROSTHETIC HIP, SUBSEQUENT ENCOUNTER: ICD-10-CM

## 2021-01-11 DIAGNOSIS — Z96.649 S/P REVISION OF TOTAL HIP: Primary | ICD-10-CM

## 2021-01-11 PROCEDURE — 97110 THERAPEUTIC EXERCISES: CPT | Performed by: PHYSICAL THERAPIST

## 2021-01-11 PROCEDURE — 97112 NEUROMUSCULAR REEDUCATION: CPT | Performed by: PHYSICAL THERAPIST

## 2021-01-11 NOTE — PROGRESS NOTES
Daily Note     Today's date: 2021  Patient name: Antelmo Correa  : 1965  MRN: [id number]  Referring provider: Abraham Bumpers, DO  Dx:   Encounter Diagnosis     ICD-10-CM    1  S/P revision of total hip  Z96 649    2  Loosening of prosthetic hip, subsequent encounter  T84 038D     Z96 649    3  Right hip pain  M25 551                   Subjective: Pt reports that terrible pain he had prior to surgery is gone  He feels so much better  He removed his post-op dressing and showered this morning  Objective: See treatment diary below  Pt enters w/o assistive device, stating he feels so much better  Decreased redness in area around incision  Incision fully approximated, still healing  Pt was verbally instructed for single crutch or SPC gait, and cautioned not to overdo it with working and not using AD to avoid soreness  Assessment: Tolerated treatment well  Patient demonstrated fatigue post treatment and gait remains mildy antalgic w/ mildly flexed hip and knee  Plan: Continue per plan of care  Progress treament per protocol        Precautions: CELENA precautions (posterior),   SOC: 2020  POC expiration:   FOTO: to be completed at post-op visit   Daily Treatment log:  Date 2020     Visit # 1 pre-op 1- post op 2 post op     Manual  5' 5'     Hip abd PROM & supported hip flexor str off edge  5' 5'                     There Exer  20' 25'     CP instruction TT        total joint HEP  Review/issued 15'       DVT/infect Prevention rev       virt home asses Pt forgot - reviewed verbally       Home prep checklist TT       Stand march @ rail  1x10 R/L      Stand hip abd @ rail  1x10 R/L grn TB knees 20x R/L     Stand HR/TR   HR off step 2x10     hooklying hip ER  5"x10 5"x10     SLR flex  1x5 1x10     LAQ w/ add  3# 1x15 R/L      Hip flexor stretch supine off edge  1'x2 supported Prone w/ SOS 30"x3     Prone B/L quad set   5"x10     Sit to stand low mat   1x10  65 inch height There activ  Stair training TT       Car transfer TT                       HEP        NMRed  15' 15'     bridges  5"x10 3" 2x10     Tandem balance  30"x1 R/L      Clamshells w/ pillow  Assisted 2x5 indep 2x5     Stand quad set w/ ball at wall  5"x10      FSU w/ rail   6" 2x10     Alt 2 cone taps   1x10 R/L CS                             Modalities  5'      CP to end  5'                        Reviewed cancellation and no-show policy w/ patient  Pt expresses understanding future appointments will be removed if one appointment is missed as a no-show or if there are two last minute cancellations  The option of a virtual visit was explained to help avoid missing a session

## 2021-01-13 ENCOUNTER — OFFICE VISIT (OUTPATIENT)
Dept: PHYSICAL THERAPY | Facility: CLINIC | Age: 56
End: 2021-01-13
Payer: COMMERCIAL

## 2021-01-13 DIAGNOSIS — Z96.641 HISTORY OF TOTAL RIGHT HIP ARTHROPLASTY: ICD-10-CM

## 2021-01-13 DIAGNOSIS — Z96.649 S/P REVISION OF TOTAL HIP: Primary | ICD-10-CM

## 2021-01-13 DIAGNOSIS — Z96.649 LOOSENING OF PROSTHETIC HIP, SUBSEQUENT ENCOUNTER: ICD-10-CM

## 2021-01-13 DIAGNOSIS — M25.551 RIGHT HIP PAIN: ICD-10-CM

## 2021-01-13 DIAGNOSIS — T84.038D LOOSENING OF PROSTHETIC HIP, SUBSEQUENT ENCOUNTER: ICD-10-CM

## 2021-01-13 PROCEDURE — 97112 NEUROMUSCULAR REEDUCATION: CPT

## 2021-01-13 PROCEDURE — 97110 THERAPEUTIC EXERCISES: CPT

## 2021-01-13 NOTE — PROGRESS NOTES
Daily Note     Today's date: 2021  Patient name: Daniel Mcdaniel  : 1965  MRN: [id number]  Referring provider: Dawson Hernandez DO  Dx:   Encounter Diagnosis     ICD-10-CM    1  S/P revision of total hip  Z96 649    2  Right hip pain  M25 551    3  Loosening of prosthetic hip, subsequent encounter  T84 038D     Z96 649    4  History of total right hip arthroplasty  Z96 641                   Subjective: pt reports his hip is feeling stiff this morning, no pain noted  Present to session with no AD  Reports he has intermittently gotten pain in his lateral R knee  Objective: See treatment diary below      Assessment: Tolerated treatment well  Patient would benefit from continued PT pt fatigued post standing there ex, decreased stance on R LE but progressing well  Cont with antalgic gait with no AD but pt is not restricted from pain  Pt inquired about full leg compression stockings due to swelling, pt educated on the swelling after surgery will last for a while but with his previous circulatory issues it is something he an ask his Dr when he sees him next week  Plan: Continue per plan of care        Precautions: CELENA precautions (posterior),   SOC: 2020  POC expiration:   FOTO: to be completed at post-op visit   Daily Treatment log:  Date 2020    Visit # 1 pre-op 1- post op 2 post op 3 post op    Manual  5' 5' 5'    Hip abd PROM & supported hip flexor str off edge  5' 5' 5'                     There Exer  20' 25' 25'    CP instruction TT        total joint HEP  Review/issued 15'       DVT/infect Prevention rev       virt home asses Pt forgot - reviewed verbally       Home prep checklist TT       Stand march @ rail  1x10 R/L      Stand hip abd @ rail  1x10 R/L grn TB knees 20x R/L grn TB knees 20x R/L     Stand HR/TR   HR off step 2x10 HR off step 2x10     hooklying hip ER  5"x10 5"x10 5"x10     SLR flex  1x5 1x10 1x10     LAQ w/ add  3# 1x15 R/L      Hip flexor stretch supine off edge  1'x2 supported Prone w/ SOS 30"x3 Prone w/ SOS 30"x4    Prone B/L quad set   5"x10 5"x15     Hip flex stretch on step     1x10    Sit to stand low mat   1x10  65 cm height 1x10  65 cm height     There activ  Stair training TT       Car transfer TT                       HEP        NMRed  15' 15' 15'    bridges  5"x10 3" 2x10 3" 2x10     Tandem balance  30"x1 R/L  15" x4 R/L     Clamshells w/ pillow  Assisted 2x5 indep 2x5 2x10     Stand quad set w/ ball at wall  5"x10  5"x15     FSU w/ rail   6" 2x10 6" 2x10     Alt 2 cone taps   1x10 R/L CS 1x10 R/L CS                             Modalities  5'      CP to end  5'                        Reviewed cancellation and no-show policy w/ patient  Pt expresses understanding future appointments will be removed if one appointment is missed as a no-show or if there are two last minute cancellations  The option of a virtual visit was explained to help avoid missing a session

## 2021-01-15 ENCOUNTER — OFFICE VISIT (OUTPATIENT)
Dept: PHYSICAL THERAPY | Facility: CLINIC | Age: 56
End: 2021-01-15
Payer: COMMERCIAL

## 2021-01-15 DIAGNOSIS — Z96.649 S/P REVISION OF TOTAL HIP: Primary | ICD-10-CM

## 2021-01-15 DIAGNOSIS — M25.551 RIGHT HIP PAIN: ICD-10-CM

## 2021-01-15 PROCEDURE — 97112 NEUROMUSCULAR REEDUCATION: CPT | Performed by: PHYSICAL THERAPIST

## 2021-01-15 PROCEDURE — 97110 THERAPEUTIC EXERCISES: CPT | Performed by: PHYSICAL THERAPIST

## 2021-01-15 NOTE — PROGRESS NOTES
Daily Note     Today's date: 1/15/2021  Patient name: Lisa Abarca  : 1965  MRN: [id number]  Referring provider: Génesis Chen DO  Dx:   Encounter Diagnosis     ICD-10-CM    1  S/P revision of total hip  Z96 649    2  Right hip pain  M25 551                   Subjective: pt reports he's amazed at how fast he is recovering from this surgery compared to his first CELENA  He reports LE edema is best in the morning, and increases as the day goes on  He is making an effort to elevate his leg during the day  Objective: See treatment diary below      Assessment: Tolerated treatment well  Patient demonstrated fatigue post treatment and is now able to complete abd SLR  Gait w/o AD remains mildly antaglic      Plan: Progress treament per protocol        Precautions: CELENA precautions (posterior),   SOC: 2020  POC expiration:   FOTO: to be completed at post-op visit   Daily Treatment log:  Date 2020 2021 2021 2021 1/15/2021   Visit # 1 pre-op 1- post op 2 post op 3 post op 4 post op   Manual  5' 5' 5'    Hip abd PROM & supported hip flexor str off edge  5' 5' 5'                     There Exer  20' 25' 25' 25'   CP instruction TT        total joint HEP  Review/issued 15'       DVT/infect Prevention rev       virt home asses Pt forgot - reviewed verbally       Home prep checklist TT       Stand march @ rail  1x10 R/L      Stand hip abd @ rail  1x10 R/L grn TB knees 20x R/L grn TB knees 20x R/L  SL abd SLR  3x5   Stand HR/TR   HR off step 2x10 HR off step 2x10     hooklying hip ER  5"x10 5"x10 5"x10  10x10"   SLR flex  1x5 1x10 1x10  1x10   LAQ w/ add  3# 1x15 R/L      Hip flexor stretch supine off edge  1'x2 supported Prone w/ SOS 30"x3 Prone w/ SOS 30"x4 Prone w/ SOS 30"x3  Sup 30"x1   Prone B/L quad set   5"x10 5"x15     Hip flex stretch on step     1x10 5"x10   Sit to stand low mat   1x10  65 cm height 1x10  65 cm height  56 cm 2x10 squat taps   Supine leg press     B/L 70# 2x12   Stat bike seat #12     L2x7'   There activ  Stair training TT       Car transfer TT                       HEP        NMRed  15' 15' 15' 20'   bridges  5"x10 3" 2x10 3" 2x10  W/ hip abd grn 2x10   Tandem balance  30"x1 R/L  15" x4 R/L     Clamshells w/ pillow  Assisted 2x5 indep 2x5 2x10  2x10   Stand quad set w/ ball at wall  5"x10  5"x15     FSU w/ rail   6" 2x10 6" 2x10  8" 2x10 R/L   Alt 2 cone taps   1x10 R/L CS 1x10 R/L CS     High march ladder     1 lap; 3x   SLS w/ fwd reach                Modalities  5'      CP to end  5'                        Reviewed cancellation and no-show policy w/ patient  Pt expresses understanding future appointments will be removed if one appointment is missed as a no-show or if there are two last minute cancellations  The option of a virtual visit was explained to help avoid missing a session

## 2021-01-18 ENCOUNTER — OFFICE VISIT (OUTPATIENT)
Dept: PHYSICAL THERAPY | Facility: CLINIC | Age: 56
End: 2021-01-18
Payer: COMMERCIAL

## 2021-01-18 DIAGNOSIS — M25.551 RIGHT HIP PAIN: ICD-10-CM

## 2021-01-18 DIAGNOSIS — Z96.641 HISTORY OF TOTAL RIGHT HIP ARTHROPLASTY: ICD-10-CM

## 2021-01-18 DIAGNOSIS — T84.038D LOOSENING OF PROSTHETIC HIP, SUBSEQUENT ENCOUNTER: ICD-10-CM

## 2021-01-18 DIAGNOSIS — Z96.649 LOOSENING OF PROSTHETIC HIP, SUBSEQUENT ENCOUNTER: ICD-10-CM

## 2021-01-18 DIAGNOSIS — Z96.649 S/P REVISION OF TOTAL HIP: Primary | ICD-10-CM

## 2021-01-18 PROCEDURE — 97112 NEUROMUSCULAR REEDUCATION: CPT

## 2021-01-18 PROCEDURE — 97110 THERAPEUTIC EXERCISES: CPT

## 2021-01-18 NOTE — PROGRESS NOTES
Daily Note     Today's date: 2021  Patient name: Antelmo Correa  : 1965  MRN: [id number]  Referring provider: Abraham Bumpers, DO  Dx:   Encounter Diagnosis     ICD-10-CM    1  S/P revision of total hip  Z96 649    2  Right hip pain  M25 551    3  Loosening of prosthetic hip, subsequent encounter  T84 038D     Z96 649    4  History of total right hip arthroplasty  Z96 641                   Subjective: pt reports his hip is feeling good this morning, more tightness than pain  Objective: See treatment diary below      Assessment: Tolerated treatment well  Patient would benefit from continued PT Pt progressing well, deficits in strength and balance with SLS activities  Pt fatigued post session  Plan: Continue per plan of care  Precautions: CELENA precautions (posterior),   SOC: 2020  POC expiration:   FOTO: to be completed at post-op visit   Daily Treatment log:  Date 2021       Visit # 5 post-op       Manual        Hip abd PROM  5'                        There Exer 25'       Stand march @ rail        SL SLR abd  3x5        Stand HR off step         hooklying hip ER 10x10"       SLR flex 2x10        LAQ w/ add 3#        Hip flexor stretch supine off edge Prone w/ SOS 30"x3         Prone B/L quad set        Hip flex stretch on step  5"x15       Sit to stand low mat 56 cm 2x10 squat taps       Supine leg press B/L 70# 3x10        Stat bike seat #12 L2x6'       There activ  HEP        NMRed 15'       Bridges w/ hip abd   grn 2x10       Tandem balance        Clamshells w/ pillow 2x10        Stand quad set w/ ball at wall 5"x15        FSU w/ rail 8" 2x10 R/L       Alt  3cone taps 1x10 R/L CS       High march ladder        SLS w/ fwd reach 1x5 R/L CGA                Modalities        CP to end                          Reviewed cancellation and no-show policy w/ patient   Pt expresses understanding future appointments will be removed if one appointment is missed as a no-show or if there are two last minute cancellations  The option of a virtual visit was explained to help avoid missing a session

## 2021-01-20 ENCOUNTER — OFFICE VISIT (OUTPATIENT)
Dept: OBGYN CLINIC | Facility: CLINIC | Age: 56
End: 2021-01-20

## 2021-01-20 ENCOUNTER — OFFICE VISIT (OUTPATIENT)
Dept: PHYSICAL THERAPY | Facility: CLINIC | Age: 56
End: 2021-01-20
Payer: COMMERCIAL

## 2021-01-20 ENCOUNTER — APPOINTMENT (OUTPATIENT)
Dept: RADIOLOGY | Facility: CLINIC | Age: 56
End: 2021-01-20
Payer: COMMERCIAL

## 2021-01-20 VITALS
HEART RATE: 78 BPM | HEIGHT: 71 IN | SYSTOLIC BLOOD PRESSURE: 130 MMHG | DIASTOLIC BLOOD PRESSURE: 82 MMHG | WEIGHT: 279 LBS | BODY MASS INDEX: 39.06 KG/M2

## 2021-01-20 DIAGNOSIS — Z96.649 S/P REVISION OF TOTAL HIP: Primary | ICD-10-CM

## 2021-01-20 DIAGNOSIS — Z47.1 AFTERCARE FOLLOWING RIGHT HIP JOINT REPLACEMENT SURGERY: ICD-10-CM

## 2021-01-20 DIAGNOSIS — Z96.649 S/P REVISION OF TOTAL HIP: ICD-10-CM

## 2021-01-20 DIAGNOSIS — M25.551 RIGHT HIP PAIN: ICD-10-CM

## 2021-01-20 DIAGNOSIS — Z96.641 AFTERCARE FOLLOWING RIGHT HIP JOINT REPLACEMENT SURGERY: ICD-10-CM

## 2021-01-20 PROBLEM — K21.9 GASTROESOPHAGEAL REFLUX DISEASE: Status: ACTIVE | Noted: 2019-01-02

## 2021-01-20 PROBLEM — M79.89 LEG SWELLING: Status: ACTIVE | Noted: 2017-03-22

## 2021-01-20 PROCEDURE — 97110 THERAPEUTIC EXERCISES: CPT | Performed by: PHYSICAL THERAPIST

## 2021-01-20 PROCEDURE — 97112 NEUROMUSCULAR REEDUCATION: CPT | Performed by: PHYSICAL THERAPIST

## 2021-01-20 PROCEDURE — 73502 X-RAY EXAM HIP UNI 2-3 VIEWS: CPT

## 2021-01-20 PROCEDURE — 99024 POSTOP FOLLOW-UP VISIT: CPT | Performed by: ORTHOPAEDIC SURGERY

## 2021-01-20 NOTE — PROGRESS NOTES
Assessment/Plan:  1  S/P revision of total hip  XR hip/pelv 2-3 vws right if performed   2  Aftercare following right hip joint replacement surgery       Sima Contreras is a pleasant 50-year-old gentleman presenting today 2 weeks after undergoing a revision right total hip arthroplasty for an aseptic loosening of the femoral stem  He is doing exceptionally well in his recovery at this point  We did reaffirm the need for compliance with posterior hip dislocation precautions to allow his capsule heal over the next 4 weeks  He will also continue with aspirin for DVT prophylaxis for the next 4 weeks  There is no concern for infection or DVT  He may now get his incision wet in the shower, but should avoid direct scrubbing or saturation  We will plan to see him back in 4 weeks with x-rays on arrival of his right hip  He and his wife expressed understanding and all of their questions were addressed today  Subjective:  2 weeks status post revision right total hip arthroplasty    Patient ID: Doe Coronado is a 48 y o  male  Sima Contreras is a pleasant 50-year-old gentleman presenting 2 weeks after the aforementioned surgery  He reports that he is doing exceptionally well and is very happy with his recovery at this point  He has been compliant with the aspirin for DVT prophylaxis  He has been participating with physical therapy 3 times a week  He is no longer taking oxycodone and is only relying on Tylenol for pain control  He does admit that he has not been completely compliant with the posterior hip dislocation precautions, but has not noted any discomfort  He denies any paresthesias in the right lower extremity  He has kept the incision dry    Review of Systems   Constitutional: Negative  HENT: Negative  Eyes: Negative  Respiratory: Negative  Cardiovascular: Positive for leg swelling  Gastrointestinal: Negative  Endocrine: Negative  Genitourinary: Negative  Musculoskeletal: Positive for myalgias  Skin: Negative  Allergic/Immunologic: Negative  Neurological: Negative  Hematological: Negative  Psychiatric/Behavioral: Negative  Past Medical History:   Diagnosis Date    Acquired pes planus     Arthralgia of ankle     Calcaneal spur     Chronic hip pain     Diabetes mellitus ([address] )     recently put on RX for elevated HGB A1C    Essential hypertension 2/28/2016    Flat foot     Flat foot 2/28/2016    GERD (gastroesophageal reflux disease)     Hallux valgus     High cholesterol     Hyperlipidemia 2/28/2016    Hypertension     Plantar fibromatosis     Posterior tibial tendinitis        Past Surgical History:   Procedure Laterality Date    COLONOSCOPY      CT EPIDURAL STEROID INJECTION (RANDI LUMBAR)  11/27/2019    CT EPIDURAL STEROID INJECTION (RANDI LUMBAR)  6/5/2020    EVACUATION OF HEMATOMA Left     knee    FL INJECTION RIGHT HIP (NON ARTHROGRAM)  10/8/2019    HAND SURGERY      reconstruction of tendons in hand s/p inury/laceration    OSTEOCHONDROMA EXCISION      left knee    DE REVISE TOTAL HIP REPLACEMENT Right 1/4/2021    Procedure: ARTHROPLASTY HIP TOTAL REVISION - RIGHT;  Surgeon: Zuhair Bethea DO;  Location: [facility];  Service: Orthopedics    DE TOTAL HIP ARTHROPLASTY Right 2/29/2016    Procedure: ARTHROPLASTY HIP TOTAL ANTERIOR;  Surgeon: Rafael Carlos MD;  Location:  MAIN OR;  Service: Orthopedics       Family History   Problem Relation Age of Onset    Diabetes Mother     Atrial fibrillation Mother     Lymphoma Father     COPD Father     Heart disease Father        Social History     Occupational History    Not on file   Tobacco Use    Smoking status: Never Smoker    Smokeless tobacco: Never Used   Substance and Sexual Activity    Alcohol use:  Yes     Alcohol/week: 28 0 standard drinks     Types: 28 Cans of beer per week    Drug use: No    Sexual activity: Not on file         Current Outpatient Medications:     acetaminophen (TYLENOL) 325 mg tablet, Take 3 tablets (975 mg total) by mouth every 8 (eight) hours, Disp:  , Rfl: 0    aspirin 325 mg tablet, Take 1 tablet (325 mg total) by mouth 2 (two) times a day, Disp: 84 tablet, Rfl: 0    b complex vitamins capsule, Take 1 capsule by mouth daily, Disp: , Rfl:     clotrimazole-betamethasone (LOTRISONE) 1-0 05 % cream, Apply 1 application topically 2 (two) times a day To affected area, Disp: , Rfl: 0    hydrochlorothiazide (MICROZIDE) 12 5 mg capsule, Take 12 5 mg by mouth daily, Disp: , Rfl:     Melatonin 1 MG CAPS, Take 3 mg by mouth , Disp: , Rfl:     metFORMIN (GLUCOPHAGE) 500 mg tablet, Take 500 mg by mouth daily with breakfast , Disp: , Rfl:     pantoprazole (PROTONIX) 40 mg tablet, , Disp: , Rfl:     simvastatin (ZOCOR) 40 mg tablet, Take by mouth, Disp: , Rfl:     losartan (COZAAR) 100 MG tablet, Take 100 mg by mouth, Disp: , Rfl:     oxyCODONE (ROXICODONE) 5 mg immediate release tablet, Take 1-2 tablets by mouth every 4-6 hours as needed for moderate to severe pain (Patient not taking: Reported on 1/20/2021), Disp: 60 tablet, Rfl: 0    No Known Allergies    Objective:  Vitals:    01/20/21 0916   BP: 130/82   Pulse: 78       Body mass index is 38 91 kg/m²  Right Hip Exam     Tenderness   The patient is experiencing no tenderness       Range of Motion   Abduction: normal   Adduction: normal   Extension: normal   Flexion: normal   External rotation: normal   Internal rotation: normal     Muscle Strength   Abduction: 5/5   Adduction: 5/5   Flexion: 5/5     Tests   ATTILA: negative  Carolyn: negative    Other   Erythema: absent  Scars: present  Sensation: normal  Pulse: present    Comments:  1mm longer RLE than LLE  Thigh and calf soft and non-tender  Posterolateral incision well approximated without erythema, ecchymosis, drainage, dehiscence, or other sign of infection  Ambulates with slightly antalgic gait on right without assistive device  Grossly NVI  Mild edema around hip without sign of hematoma          Physical Exam  Vitals signs and nursing note reviewed  Constitutional:       Appearance: He is well-developed  Comments: Body mass index is 38 91 kg/m²  HENT:      Head: Normocephalic and atraumatic  Right Ear: External ear normal       Left Ear: External ear normal    Eyes:      Extraocular Movements: Extraocular movements intact  Neck:      Musculoskeletal: Normal range of motion  Cardiovascular:      Rate and Rhythm: Normal rate  Pulmonary:      Effort: Pulmonary effort is normal    Abdominal:      Palpations: Abdomen is soft  Musculoskeletal:      Comments: See ortho exam   Skin:     General: Skin is warm and dry  Neurological:      Mental Status: He is alert and oriented to person, place, and time  Psychiatric:         Mood and Affect: Mood normal          Behavior: Behavior normal          Thought Content: Thought content normal          Judgment: Judgment normal        I have personally reviewed pertinent films in PACS of the x-rays taken today of his right hip and compared them to previous postoperative films  The revision prosthesis maintains adequate alignment and offset with good fixation  There is no evidence of periprosthetic fracture, dislocation, or other interval changes

## 2021-01-20 NOTE — PROGRESS NOTES
Daily Note     Today's date: 2021  Patient name: Mary Ellen Vargas  : 1965  MRN: [id number]  Referring provider: Nighat Neville DO  Dx:   Encounter Diagnosis     ICD-10-CM    1  S/P revision of total hip  Z96 649    2  Right hip pain  M25 551                   Subjective: Pt reports MD was pleased w/ his progress and XRays look good (pt came from MD follow up)  He can now shower w/o wound dressing  Objective: See treatment diary below; Pt 15' late due to MD appt running late  Assessment: Tolerated treatment well  Patient demonstrated fatigue post treatment  Pt struggles w/ balance/SLS and is challenged by SLR abd  Plan: Continue per plan of care  Precautions: CELENA precautions (posterior),   SOC: 2020  POC expiration:   FOTO: to be completed at post-op visit   Daily Treatment log:  Date 2021  FOTO      Visit # 5 post-op 6 POST UP      Manual        Hip abd PROM  5'                        There Exer 25' 15'      SL SLR abd  3x5  3X5      Stand HR off step         hooklying hip ER 10x10" 10X10"      SLR flex 2x10        LAQ w/ add 3#        Hip flexor stretch supine off edge Prone w/ SOS 30"x3   Supine w/ SOS 30"x3      Hip flex stretch on step  5"x15       Sit to stand low mat 56 cm 2x10 squat taps       Supine leg press B/L 70# 3x10  B/L 70# 3x10      Stat bike seat #12 L2x6' L3x5'      There activ  HEP        NMRed 15' 20'      Bridges w/ hip abd   grn 2x10 blk 2x10      Clamshells w/ pillow 2x10  2# 2X12      Stand quad set w/ ball at wall 5"x15        FSU w/ rail 8" 2x10 R/L 10" 2x10 R/L      Alt  3cone taps 1x10 R/L CS               SLS w/ fwd reach 1x5 R/L CGA  1x5 R/L CS      diag walk TB ankles  grn 10'x4      Side step TB ankles  grn 10'x3 R/L      Modalities        CP to end                          Reviewed cancellation and no-show policy w/ patient   Pt expresses understanding future appointments will be removed if one appointment is missed as a no-show or if there are two last minute cancellations  The option of a virtual visit was explained to help avoid missing a session

## 2021-01-22 ENCOUNTER — OFFICE VISIT (OUTPATIENT)
Dept: PHYSICAL THERAPY | Facility: CLINIC | Age: 56
End: 2021-01-22
Payer: COMMERCIAL

## 2021-01-22 DIAGNOSIS — Z96.649 S/P REVISION OF TOTAL HIP: Primary | ICD-10-CM

## 2021-01-22 DIAGNOSIS — M25.551 RIGHT HIP PAIN: ICD-10-CM

## 2021-01-22 PROCEDURE — 97112 NEUROMUSCULAR REEDUCATION: CPT | Performed by: PHYSICAL THERAPIST

## 2021-01-22 PROCEDURE — 97110 THERAPEUTIC EXERCISES: CPT | Performed by: PHYSICAL THERAPIST

## 2021-01-22 NOTE — PROGRESS NOTES
Daily Note     Today's date: 2021  Patient name: Sukhi German  : 1965  MRN: [id number]  Referring provider: Concha Torres DO  Dx:   Encounter Diagnosis     ICD-10-CM    1  S/P revision of total hip  Z96 649    2  Right hip pain  M25 551                   Subjective: Pt reports no pain, he feels he is progressing very well  Objective: See treatment diary below      Assessment: Tolerated treatment well  Patient demonstrated fatigue post treatment and improving functional mobility  slight limp remains and slight flexed knee gait  Plan: Continue per plan of care  Precautions: CELENA precautions (posterior),   SOC: 2020  POC expiration:   FOTO: to be completed at post-op visit   Daily Treatment log:  Date 2021  FOTO 2021     Visit # 5 post-op 6 POST UP 7 post op     Manual        Hip abd PROM  5'                        There Exer 25' 15' 25'     SL SLR abd  3x5  3X5 2x10     Stand HR off step    1x10     hooklying hip ER 10x10" 10X10" 10x10"     SLR flex 2x10        Sup ham stret    W/ SOS 20"x3     Hip flexor stretch supine off edge Prone w/ SOS 30"x3   Supine w/ SOS 30"x3 Supine w/ SOS 30"x3     Hip flex stretch on step  5"x15  20"x3     Sit to stand low mat 56 cm 2x10 squat taps       Supine leg press B/L 70# 3x10  B/L 70# 3x10 B/L 70# 3x10  Uni 30# 2x10     Stat bike seat #12 L2x6' L3x5' L3x5'     There activ                          HEP        NMRed 15' 20' 20'     Bridges w/ hip abd   grn 2x10 blk 2x10      Clamshells w/ pillow 2x10  2# 2X12      Stand quad set w/ ball at wall 5"x15        FSU w/ rail 8" 2x10 R/L 10" 2x10 R/L 10" 2x10 R/L     Alt  3cone taps 1x10 R/L CS       Ladder high march   2 laps     SLS w/ fwd reach 1x5 R/L CGA  1x5 R/L CS      diag walk TB ankles  grn 10'x4 grn 20'x2     Side step TB ankles  grn 10'x3 R/L grn 20'x1 R/L     Feet on pball bridge   5"x15     Modalities        CP to end                          Reviewed cancellation and no-show policy w/ patient  Pt expresses understanding future appointments will be removed if one appointment is missed as a no-show or if there are two last minute cancellations  The option of a virtual visit was explained to help avoid missing a session

## 2021-01-25 ENCOUNTER — OFFICE VISIT (OUTPATIENT)
Dept: PHYSICAL THERAPY | Facility: CLINIC | Age: 56
End: 2021-01-25
Payer: COMMERCIAL

## 2021-01-25 DIAGNOSIS — T84.038D LOOSENING OF PROSTHETIC HIP, SUBSEQUENT ENCOUNTER: ICD-10-CM

## 2021-01-25 DIAGNOSIS — Z96.649 S/P REVISION OF TOTAL HIP: Primary | ICD-10-CM

## 2021-01-25 DIAGNOSIS — Z96.649 LOOSENING OF PROSTHETIC HIP, SUBSEQUENT ENCOUNTER: ICD-10-CM

## 2021-01-25 DIAGNOSIS — Z96.641 HISTORY OF TOTAL RIGHT HIP ARTHROPLASTY: ICD-10-CM

## 2021-01-25 DIAGNOSIS — M25.551 RIGHT HIP PAIN: ICD-10-CM

## 2021-01-25 PROCEDURE — 97112 NEUROMUSCULAR REEDUCATION: CPT

## 2021-01-25 PROCEDURE — 97110 THERAPEUTIC EXERCISES: CPT

## 2021-01-25 NOTE — PROGRESS NOTES
Daily Note     Today's date: 2021  Patient name: Corky Sequeira  : 1965  MRN: [id number]  Referring provider: Estefani Lilyl DO  Dx:   Encounter Diagnosis     ICD-10-CM    1  S/P revision of total hip  Z96 649    2  Right hip pain  M25 551    3  Loosening of prosthetic hip, subsequent encounter  T84 [id number]     Z96 649    4  History of total right hip arthroplasty  Z96 641                   Subjective: pt reports sleeping (due to wedge between legs) and putting his shoes and socks on are most difficult for him at this time due to tightness but his pain has been very minimal, has not had to take any tylenol  Objective: See treatment diary below      Assessment: Tolerated treatment well  Patient would benefit from continued PT deficits in SLS time during dynamic and static balance  Fatigued post session         Plan: Continue per plan of care  Precautions: CELENA precautions (posterior),   SOC: 2020  POC expiration:   FOTO: to be completed at post-op visit   Daily Treatment log:  Date 2021  FOTO 2021    Visit # 5 post-op 6 POST UP 7 post op 8 post op    Manual        Hip abd PROM  5'                        There Exer 25' 15' 25' 25'    SL SLR abd  3x5  3X5 2x10 2x10     Stand HR off step    1x10     hooklying hip ER 10x10" 10X10" 10x10" 10x10"     SLR flex 2x10    2x10     Sup ham stret    W/ SOS 20"x3 W/ SOS 20"x3    Hip flexor stretch supine off edge Prone w/ SOS 30"x3   Supine w/ SOS 30"x3 Supine w/ SOS 30"x3 Supine w/ SOS 30"x3     Hip flex stretch on step  5"x15  20"x3     Sit to stand low mat 56 cm 2x10 squat taps       Supine leg press B/L 70# 3x10  B/L 70# 3x10 B/L 70# 3x10  Uni 30# 2x10 B/L 70# 3x10  Uni 30# 2x10     Stat bike seat #12 L2x6' L3x5' L3x5' L3 x5'     There activ                          HEP        NMRed 15' 20' 20' 20'    Bridges w/ hip abd   grn 2x10 blk 2x10      Clamshells w/ pillow 2x10  2# 2X12  3# 2x12     Stand quad set w/ ball at wall 5"x15        FSU w/ rail 8" 2x10 R/L 10" 2x10 R/L 10" 2x10 R/L 10" 2x10 R/L     Alt  3cone taps 1x10 R/L CS       Ladder high march   2 laps 2 laps     SLS w/ fwd reach 1x5 R/L CGA  1x5 R/L CS  SLS 10"x5     diag walk TB ankles  grn 10'x4 grn 20'x2 grn 20'x2    Side step TB ankles  grn 10'x3 R/L grn 20'x1 R/L grn 20'x1 R/L     Feet on pball bridge   5"x15 5"x15     Modalities        CP to end                          Reviewed cancellation and no-show policy w/ patient  Pt expresses understanding future appointments will be removed if one appointment is missed as a no-show or if there are two last minute cancellations  The option of a virtual visit was explained to help avoid missing a session

## 2021-01-27 ENCOUNTER — OFFICE VISIT (OUTPATIENT)
Dept: PHYSICAL THERAPY | Facility: CLINIC | Age: 56
End: 2021-01-27
Payer: COMMERCIAL

## 2021-01-27 DIAGNOSIS — Z96.649 S/P REVISION OF TOTAL HIP: Primary | ICD-10-CM

## 2021-01-27 DIAGNOSIS — M25.551 RIGHT HIP PAIN: ICD-10-CM

## 2021-01-27 PROCEDURE — 97110 THERAPEUTIC EXERCISES: CPT | Performed by: PHYSICAL THERAPIST

## 2021-01-27 PROCEDURE — 97112 NEUROMUSCULAR REEDUCATION: CPT | Performed by: PHYSICAL THERAPIST

## 2021-01-27 NOTE — PROGRESS NOTES
PT Re-Evaluation     Today's date: 2021  Patient name: Tricia Myers  : 1965  MRN: [id number]  Referring provider: Dev Alvarez DO  Dx:   Encounter Diagnosis     ICD-10-CM    1  S/P revision of total hip  Z96 649    2  Right hip pain  M25 551                      Assessment  Assessment details: 2020: Tricia Myers is a 48 y o  male who presents with pain, decreased strength, decreased ROM, decreased joint mobility and ambulatory dysfunction  Due to these impairments, patient has difficulty performing ADL's, recreational activities, work-related activities, engaging in social activities, ambulation, stair negotiation, lifting/carrying, transfers  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, he owns a [employer]  Patient's clinical presentation is consistent with their referring diagnosis of Loosening of prosthetic hip, subsequent encounter, Right hip pain, History of total right hip arthroplasty which is in need of revision  Plan: Ambulatory referral to Physical Therapy    Pre-op exam  Plan: Ambulatory referral to Physical Therapy  Patient has been educated in post-op contraindications / precautions, wound care, gait training, weight bearing status, home exercise program and plan of care  Patient would benefit from skilled physical therapy services to address their aforementioned functional limitations and progress towards prior level of function and independence with home exercise program      2021: Pt presents for first post-op visit w/ mild to moderate pain controlled w/ medication  He ambulates safely w/ RW  He presents w/ expected ROM and strength limitations  Redness around incision which reportedly existed pre-op  Pt feels like R leg is now longer, and was advised this feeling will likely resolve  He is an excellent candidate for PT  Continue PT per POC     2021: Pt is progressing well post CELENA revision   He still lacks hip ER strength and ROM which limits his ability to put on socks/shoes, abd strength which leads to Trendellenberg pattern of gait and general balance due to remaining hip weakness  He will benefit from continued PT w/ re-assessment in 4 weeks  Impairments: abnormal gait, abnormal or restricted ROM, activity intolerance, impaired balance, impaired physical strength and weight-bearing intolerance  Functional limitations: not driving; has resumed recip stairs except for first thing in morning, no remaining difficulty w/ bed mobility and transfersUnderstanding of Dx/Px/POC: good   Prognosis: good    Goals  Short Term Goals to be accomplished in 4 weeks: (2/4/2020)  STG1: Pt will be I with HEP and understanding CELENA precautions - met  STG2: Pt will demo 50% improvement in hip AROM- met  STG3: Pt will demo 1/2 MMT strength inc hip - met  STG4: Pt will demo ease w/ bed mobility  - met  STG5: Pt to advance from ambulation w/ RW to [facility] or no AD level surfaces  - met     Long Term Goals to be accomplished in 12 weeks: (4/1/2021) - ongoing LTG's (met #4)  LTG1: Pt will achieve full hip AROM to allow reciprocal stairs and ease w/ LE dressing  LTG2: Pt will demo hip strength 4+/5 or better to allow SLS x 30"  LTG3: Pt will return to work, and will need to be able to ambulate on uneven terrain w/o AD to do so  LTG4: Reduce pain to 0-2/10 w/ activity  - met  LTG5: Pt to demonstrate sit to stand transfers w/o UE assist to facilitate ease w/ squatting          Plan  Plan details:       Patient would benefit from: PT eval and skilled physical therapy  Planned modality interventions: cryotherapy, thermotherapy: hydrocollator packs and unattended electrical stimulation  Planned therapy interventions: manual therapy, neuromuscular re-education, therapeutic activities, therapeutic exercise, home exercise program, stretching, patient education, postural training, balance/weight bearing training and functional ROM exercises  Frequency: 2x week  Plan of Care beginning date: 12/28/2020  Plan of Care expiration date: 2021  Treatment plan discussed with: patient        Subjective Evaluation    History of Present Illness  Mechanism of injury: 2020: Pt states his original CELENA was 2015 and never felt right  He went for a second opinion and he found out the prosthesis is loose and needs to be replaced  He is having a revision on 2021  He reports near constant pain and his function is significantly limited  He states it feels like he can feel the prosthetic loosely moving in his hip  CELENA revision planned for 2021: Pt reports his post-op pain is managed pretty well if he takes his medications consistently  He feels like his Right leg is longer than his left  Pt reports redness at R hip was present pre-op, is B/L and attributes it to wearing jeans all the time  States MD is aware of redness  2021: Pt reports decreasing R LE edema, still has ankle swelling, but less than before  He feels stiff upon waking in the morning and/or after prolonged sitting  His balance is not back to PLOF, and he still struggles w/ donning shoes/socks due to hip ROM  Pain  At best pain ratin  At worst pain rating: 3  Pain location: R lateral/posterior hip and thigh  Alleviating factors: moving around  Exacerbated by: 1st thing in a m ; after prolonged positions    Progression: improved    Social Support  Steps to enter house: yes (1+1 w/ post, no rail)  Stairs in house: yes (w/ rail on left then on right to ascend)   Lives with: spouse and adult children    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[employer])    Diagnostic Tests  X-ray: abnormal  Treatments  Current treatment: physical therapy  Patient Goals  Patient goals for therapy: decreased pain, return to sport/leisure activities and return to work  Patient goal: weight bear w/o pain        Objective     A/PROM:  R  R  R  L     2020  Hip flex sup  90  35*  80*  100  Hip abd sup  35  30  32*  32  Hip ER prone  10sit  5 sit  50  50  Hip IR prone  35sit  NT  20**  30    MMT:    R  R  R  L     2020  Hip flex   4/5  3/5  4+/5*  5/5  Hip abd  4/5  2+/5  4+/5  5/5  Hip ER   4-/5  3/5  3+/5*  5/5  Hip IR   4+/5  3+/5  4/5  5/5  Hip ext   5/5  3-/5  5/5  5/5  Knee flexion  5/5  4/5  5/5  5/5  Knee exten  4+/5  4/5  5/5  5/5      Balance:   2020  Tandem R post w/ EO:  30 sec (+) 30 sec (+) 30 sec (+)  Tandem L post w/ EO:  30 sec (+) 30 sec (+) 30 sec (+)  SLS R w/ EO:    10 sec  unable  5 sec*  SLS L w/ EO:   20 sec  20 sec  20 sec    Function: ambulates w/o AD, mild Trendellenberg pattern   ambulates w/ RW; min difficulty w/ bed mobility and sit to stand; NT squatting today  2020- poor squat form w/ pain, pain upon standing after sitting,     TU2021 8 sec w/o AD    15 seconds 2021 w/ RW   16 seconds 2020 w/o AD        Palpation: incision fully approximated; (-) Homen's  2021 - No reported glut tenderness; (-) Homen's  Post-op dressing in place  Redness in surrounding tissue into gluts, iliac crest - pt states this was present pre-op and MD is aware  2020 - TTP R lat gluts     Flexibility: mod quintanilla B/L hams, pirif/hip flexor mod/bharti quintanilla R          Precautions: CELENA precautions (posterior),   SOC: 2020  POC expiration: 2021  RE: 2021  FOTO: 2021  Daily Treatment log:  Date 2021  FOTO [id number]  RE   Visit # 5 post-op 6 POST UP 7 post op 8 post op 9  Post op   Manual        Hip abd PROM 5'       Stick rolling lat glut     Next visit           There Exer 25' 15' 25' 25' 30'   SL SLR abd  3x5  3X5 2x10 2x10  2" 2x10   Stand HR off step    1x10     hooklying hip ER 10x10" 10X10" 10x10" 10x10"  Fig 4 ER stretch 1'x2   SLR flex 2x10    2x10     Sup ham stret    W/ SOS 20"x3 W/ SOS 20"x3    Hip flexor stretch supine off edge Prone w/ SOS 30"x3   Supine w/ SOS 30"x3 Supine w/ SOS 30"x3 Supine w/ SOS 30"x3  Supine w/ SOS 30"x2   Hip flex stretch on step  5"x15  20"x3  10"x5   Sit to stand low mat 56 cm 2x10 squat taps       Supine leg press B/L 70# 3x10  B/L 70# 3x10 B/L 70# 3x10  Uni 30# 2x10 B/L 70# 3x10  Uni 30# 2x10  B/L 75# 2x10  Uni 35# 2x10   Stat bike seat #11 L2x6' L3x5' L3x5' L3 x5'     Hip ER w/ ball btwn knees     Red 2x10                   HEP        NMRed 15' 20' 20' 20' 15'   Bridges w/ hip abd   grn 2x10 blk 2x10      Clamshells w/ pillow 2x10  2# 2X12  3# 2x12     Stand quad set w/ ball at wall 5"x15        FSU w/ rail 8" 2x10 R/L 10" 2x10 R/L 10" 2x10 R/L 10" 2x10 R/L  10" 2x10 R/L   Alt  3cone taps 1x10 R/L CS    2x10 R/L indep   Ladder high march   2 laps 2 laps     SLS w/ fwd reach 1x5 R/L CGA  1x5 R/L CS  SLS 10"x5     diag walk TB ankles  grn 10'x4 grn 20'x2 grn 20'x2    Side step TB ankles  grn 10'x3 R/L grn 20'x1 R/L grn 20'x1 R/L     Feet on pball bridge   5"x15 5"x15  5"x10   Modalities        CP to end                            Reviewed cancellation and no-show policy w/ patient  Pt expresses understanding future appointments will be removed if one appointment is missed as a no-show or if there are two last minute cancellations  The option of a virtual visit was explained to help avoid missing a session

## 2021-01-29 ENCOUNTER — OFFICE VISIT (OUTPATIENT)
Dept: PHYSICAL THERAPY | Facility: CLINIC | Age: 56
End: 2021-01-29
Payer: COMMERCIAL

## 2021-01-29 ENCOUNTER — TELEPHONE (OUTPATIENT)
Dept: OBGYN CLINIC | Facility: HOSPITAL | Age: 56
End: 2021-01-29

## 2021-01-29 DIAGNOSIS — M25.551 RIGHT HIP PAIN: ICD-10-CM

## 2021-01-29 DIAGNOSIS — Z96.641 HISTORY OF TOTAL RIGHT HIP ARTHROPLASTY: ICD-10-CM

## 2021-01-29 DIAGNOSIS — T84.038D LOOSENING OF PROSTHETIC HIP, SUBSEQUENT ENCOUNTER: ICD-10-CM

## 2021-01-29 DIAGNOSIS — Z96.649 S/P REVISION OF TOTAL HIP: Primary | ICD-10-CM

## 2021-01-29 DIAGNOSIS — Z96.649 LOOSENING OF PROSTHETIC HIP, SUBSEQUENT ENCOUNTER: ICD-10-CM

## 2021-01-29 PROCEDURE — 97110 THERAPEUTIC EXERCISES: CPT

## 2021-01-29 PROCEDURE — 97112 NEUROMUSCULAR REEDUCATION: CPT

## 2021-01-29 NOTE — TELEPHONE ENCOUNTER
Murphy Smith at [facility] is calling to let Dr Ros Ventura know that the patient slipped yesterday  Patient's right leg slid out into abduction and he caught himself, didn't hit the ground very hard and did manage to land on the left side (kind of easing himself down)  Patient seems fine, but Sofie Hawks wanted Dr Ros Ventura to be aware      ARTHROPLASTY HIP TOTAL REVISION - RIGHT (Right Hip) on 01-

## 2021-01-29 NOTE — PROGRESS NOTES
Daily Note     Today's date: 2021  Patient name: Conrado Tucker  : 1965  MRN: [id number]  Referring provider: Jose Steiner DO  Dx:   Encounter Diagnosis     ICD-10-CM    1  S/P revision of total hip  Z96 649    2  Right hip pain  M25 551    3  Loosening of prosthetic hip, subsequent encounter  T84 038D     Z96 649    4  History of total right hip arthroplasty  Z96 641                   Subjective: able to get his compression stockings on for the first time yesterday  Pt reports slipping on the ice and falling yesterday when taking out the mail, did not have any increases in pain after the fall  Some ant hip tightness but he was also on his feet a lot yesterday       Objective: See treatment diary below  Supervising PT reached out to Dr Ros Ventura 2021 to inform him of the fall, she spoke with makayla who reported she would reach out to Dr Marlin Chaves  Assessment: Tolerated treatment well  Patient would benefit from continued PT no increases in hip pain during session, session focused on less strengthening today due to recent report of falling yesterday  Decreased ant hip tightness post session       Plan: Continue per plan of care        Precautions: CELENA precautions (posterior),   SOC: 2020  POC expiration: 2021  RE: 2021  FOTO: 2021  Daily Treatment log:  Date 2021       Visit # 10 post-op       Manual 5'        Hip abd PROM        Stick rolling lat glut 5'                There Exer 25'        SL SLR abd         Stand HR off step  2x12        Figure 4 stretch  1'x2        SLR flex        Sup ham stret  W/ SOS 20"x3        Hip flexor stretch supine off edge Supine w/ SOS 30"x2       Hip flex stretch on step  10"x10        Sit to stand low mat 56 cm 2x10 squat taps       Supine leg press B/L 70# 2x10   Uni 30# 2x10        Stat bike seat #11 L3x6'       Hip ER w/ ball btwn knees Red                       HEP        NMRed 15'       Bridges w/ hip abd  black 2x10       Clamshells w/ pillow 3# 2x12         Stand quad set w/ ball at wall 5"x15        FSU w/ rail 10" 2x10 R/L       Alt  3cone taps        Ladder high march        SLS w/ fwd reach        diag walk TB ankles grn        Side step TB ankles grn       Feet on pball bridge        Modalities        CP to end                            Reviewed cancellation and no-show policy w/ patient  Pt expresses understanding future appointments will be removed if one appointment is missed as a no-show or if there are two last minute cancellations  The option of a virtual visit was explained to help avoid missing a session

## 2021-02-01 ENCOUNTER — APPOINTMENT (OUTPATIENT)
Dept: PHYSICAL THERAPY | Facility: CLINIC | Age: 56
End: 2021-02-01
Payer: COMMERCIAL

## 2021-02-03 ENCOUNTER — OFFICE VISIT (OUTPATIENT)
Dept: PHYSICAL THERAPY | Facility: CLINIC | Age: 56
End: 2021-02-03
Payer: COMMERCIAL

## 2021-02-03 DIAGNOSIS — Z96.649 LOOSENING OF PROSTHETIC HIP, SUBSEQUENT ENCOUNTER: ICD-10-CM

## 2021-02-03 DIAGNOSIS — Z96.649 S/P REVISION OF TOTAL HIP: Primary | ICD-10-CM

## 2021-02-03 DIAGNOSIS — M25.551 RIGHT HIP PAIN: ICD-10-CM

## 2021-02-03 DIAGNOSIS — Z96.641 HISTORY OF TOTAL RIGHT HIP ARTHROPLASTY: ICD-10-CM

## 2021-02-03 DIAGNOSIS — T84.038D LOOSENING OF PROSTHETIC HIP, SUBSEQUENT ENCOUNTER: ICD-10-CM

## 2021-02-03 PROCEDURE — 97110 THERAPEUTIC EXERCISES: CPT

## 2021-02-03 PROCEDURE — 97112 NEUROMUSCULAR REEDUCATION: CPT

## 2021-02-03 NOTE — PROGRESS NOTES
Daily Note     Today's date: 2/3/2021  Patient name: James Covarrubias  : 1965  MRN: [id number]  Referring provider: Ara Stuart DO  Dx:   Encounter Diagnosis     ICD-10-CM    1  S/P revision of total hip  Z96 649    2  Right hip pain  M25 551    3  Loosening of prosthetic hip, subsequent encounter  T84 038D     Z96 649    4  History of total right hip arthroplasty  Z96 641                   Subjective: pt reports no issues after his fall last week, hip has no pain and is feeling good on arrival to session today  Pt repots Dr singh's office did reach out to him after the fall     Objective: See treatment diary below      Assessment: Tolerated treatment well  Patient would benefit from continued PT Pt with no difficulties in his hip after the recent fall, pt making good progress towards PLOF  Some deficits in dynamic balance  Cont with balance and strengthening progressions as tolerated  Plan: Continue per plan of care        Precautions: CELENA precautions (posterior),   SOC: 2020  POC expiration: 2021  RE: 2021  FOTO: 2021  Daily Treatment log:  Date 2021 2/3/2021      Visit # 10 post-op       Manual 5'        Hip abd PROM        Stick rolling lat glut 5'                There Exer 25'  20'      SL SLR abd         Stand HR off step  2x12        Figure 4 stretch  1'x2  1'x2        SLR flex        Sup ham stret  W/ SOS 20"x3  W/ SOS 20"x3        Hip flexor stretch supine off edge Supine w/ SOS 30"x2 Supine w/ SOS 30"x4      Hip flex stretch on step  10"x10  10"x10        Sit to stand low mat 56 cm 2x10 squat taps       Supine leg press B/L 70# 2x10   Uni 30# 2x10  B/L 70# 3x10   Uni 30# 2x10       Stat bike seat #11 L3x6' L5x6'       Hip ER w/ ball btwn knees Red                       HEP        NMRed 15' 25'       WB balance AP   20x       Bridges w/ hip abd  black 2x10       Clamshells w/ pillow 3# 2x12   Red TB 2x12      Stand quad set w/ ball at wall 5"x15        FSU w/ rail 10" 2x10 R/L 10" 2x10 R/L       Alt  3cone taps        Ladder high march        SLS w/ fwd reach        diag walk TB ankles grn  Grn 20'x2      Side step TB ankles grn Grn 20'x2       Feet on pball bridge  5" 2x10      Modalities        CP to end                            Reviewed cancellation and no-show policy w/ patient  Pt expresses understanding future appointments will be removed if one appointment is missed as a no-show or if there are two last minute cancellations  The option of a virtual visit was explained to help avoid missing a session

## 2021-02-05 ENCOUNTER — OFFICE VISIT (OUTPATIENT)
Dept: PHYSICAL THERAPY | Facility: CLINIC | Age: 56
End: 2021-02-05
Payer: COMMERCIAL

## 2021-02-05 DIAGNOSIS — Z96.649 S/P REVISION OF TOTAL HIP: Primary | ICD-10-CM

## 2021-02-05 DIAGNOSIS — M25.551 RIGHT HIP PAIN: ICD-10-CM

## 2021-02-05 PROCEDURE — 97112 NEUROMUSCULAR REEDUCATION: CPT | Performed by: PHYSICAL THERAPIST

## 2021-02-05 PROCEDURE — 97110 THERAPEUTIC EXERCISES: CPT | Performed by: PHYSICAL THERAPIST

## 2021-02-05 NOTE — PROGRESS NOTES
Daily Note     Today's date: 2021  Patient name: Daniel Mcdaniel  : 1965  MRN: [id number]  Referring provider: Dawson Hernandez DO  Dx:   Encounter Diagnosis     ICD-10-CM    1  S/P revision of total hip  Z96 649    2  Right hip pain  M25 551                   Subjective: Pt has no new c/o  He is able to get his shoes on indep, but not his compression socks  Objective: See treatment diary below      Assessment: Tolerated treatment well  Patient continues w/ mild Trendellenberg pattern  He has minimal discomfort  He notes "hard" area of tissue at lateral hip under incision  Plan: Continue per plan of care  Pt was instructed for scar massage as well       Precautions: CELENA precautions (posterior),   SOC: 2020  POC expiration: 2021  RE: 2021  FOTO: 2021  Daily Treatment log:  Date 2021 2/3/2021 2021     Visit # 10 post-op  12-post op     Manual 5'        Hip abd PROM        Stick rolling lat glut 5'   5'     Scar massage   2'     There Exer 25'  20' 20'     SL SLR abd    2" 2x10     Stand HR off step  2x12        Figure 4 stretch  1'x2  1'x2   1'x2     SLR flex        Sup ham stret  W/ SOS 20"x3  W/ SOS 20"x3   W/ SOS 30"x2     Hip flexor stretch supine off edge Supine w/ SOS 30"x2 Supine w/ SOS 30"x4      Hip flex stretch on step  10"x10  10"x10   20"x3     Sit to stand low mat 56 cm 2x10 squat taps       Supine leg press B/L 70# 2x10   Uni 30# 2x10  B/L 70# 3x10   Uni 30# 2x10  B/L 75# 3x10  Uni 35# 2x10     Stat bike seat #11 L3x6' L5x6'  L6x6'     Hip ER w/ ball btwn knees Red  Red 20x                     HEP        NMRed 15' 25'  20'     WB balance AP   20x  20x     Bridges w/ hip abd  black 2x10       Clamshells w/ pillow 3# 2x12   Red TB 2x12 Red 2x12     Stand quad set w/ ball at wall 5"x15        FSU w/ rail 10" 2x10 R/L 10" 2x10 R/L  6"+2" foam  2x10      Alt  3cone taps        Tandem walk cone taps   6 cones; 6x     diag walk TB ankles grn  Grn 20'x2      Side step TB ankles grn Grn 20'x2  grn 20'x2 R/L     Feet on pball bridge  5" 2x10      Modalities        CP to end                            Reviewed cancellation and no-show policy w/ patient  Pt expresses understanding future appointments will be removed if one appointment is missed as a no-show or if there are two last minute cancellations  The option of a virtual visit was explained to help avoid missing a session

## 2021-02-08 ENCOUNTER — OFFICE VISIT (OUTPATIENT)
Dept: PHYSICAL THERAPY | Facility: CLINIC | Age: 56
End: 2021-02-08
Payer: COMMERCIAL

## 2021-02-08 DIAGNOSIS — Z96.649 S/P REVISION OF TOTAL HIP: Primary | ICD-10-CM

## 2021-02-08 DIAGNOSIS — Z96.641 HISTORY OF TOTAL RIGHT HIP ARTHROPLASTY: ICD-10-CM

## 2021-02-08 DIAGNOSIS — Z96.649 LOOSENING OF PROSTHETIC HIP, SUBSEQUENT ENCOUNTER: ICD-10-CM

## 2021-02-08 DIAGNOSIS — M25.551 RIGHT HIP PAIN: ICD-10-CM

## 2021-02-08 DIAGNOSIS — T84.038D LOOSENING OF PROSTHETIC HIP, SUBSEQUENT ENCOUNTER: ICD-10-CM

## 2021-02-08 PROCEDURE — 97112 NEUROMUSCULAR REEDUCATION: CPT

## 2021-02-08 PROCEDURE — 97110 THERAPEUTIC EXERCISES: CPT

## 2021-02-08 NOTE — PROGRESS NOTES
Daily Note     Today's date: 2021  Patient name: Ondina Velez  : 1965  MRN: [id number]  Referring provider: Jose A Anderson DO  Dx:   Encounter Diagnosis     ICD-10-CM    1  S/P revision of total hip  Z96 649    2  Right hip pain  M25 551    3  Loosening of prosthetic hip, subsequent encounter  T84 038D     Z96 649    4  History of total right hip arthroplasty  Z96 641                   Subjective: pt reports feeling phenomenal this morning, no hip pain noted only tightness  Reports ADLs are getting easier at home, most difficulty with putting on his socks and shoes  Objective: See treatment diary below      Assessment: Tolerated treatment well  Patient would benefit from continued PT pt has no increases in pain during session and no difficulties with increased weight on leg press today  Cont with stretching and strengthening to tolerance to address impairments in ROM for greater ease in ADLs and to return to PLOF  Plan: Continue per plan of care        Precautions: CELENA precautions (posterior),   SOC: 2020  POC expiration: 2021  RE: 2021  FOTO: 2021  Daily Treatment log:  Date 2021 2/3/2021 2021 2021    Visit # 10 post-op  12-post op 13-post op    Manual 5'        Hip abd PROM        Stick rolling lat glut 5'   5' 5'     Scar massage   2'     There Exer 25'  20' 20' 25'    SL SLR abd    2" 2x10     Stand HR off step  2x12        Figure 4 stretch  1'x2  1'x2   1'x2 W/ ball 1'x3      SLR flex        Sup ham stret  W/ SOS 20"x3  W/ SOS 20"x3   W/ SOS 30"x2 W/ SOS 30"x2     Hip flexor stretch supine off edge Supine w/ SOS 30"x2 Supine w/ SOS 30"x4  Supine w/ SOS 30"x4     Hip flex stretch on step  10"x10  10"x10   20"x3 20"x3     Sit to stand low mat 56 cm 2x10 squat taps       Supine leg press B/L 70# 2x10   Uni 30# 2x10  B/L 70# 3x10   Uni 30# 2x10  B/L 75# 3x10  Uni 35# 2x10 B/L 80# 3x10  Uni 40# 2x10    Stat bike seat #11 L3x6' L5x6'  L6x6' L6x6'     Hip ER w/ ball btwn knees Red  Red 20x                     HEP        NMRed 15' 25'  20' 15'    WB balance AP   20x  20x     Bridges w/ hip abd  black 2x10       Clamshells w/ pillow 3# 2x12   Red TB 2x12 Red 2x12 Red 2x12     Stand quad set w/ ball at wall 5"x15        FSU w/ rail 10" 2x10 R/L 10" 2x10 R/L  6"+2" foam  2x10  W/ high knee 6"+2" foam  2x10     Alt  3cone taps        Tandem walk cone taps   6 cones; 6x     diag walk TB ankles grn  Grn 20'x2      Side step TB ankles grn Grn 20'x2  grn 20'x2 R/L     Feet on pball bridge  5" 2x10  5" 2x10     Modalities        CP to end                            Reviewed cancellation and no-show policy w/ patient  Pt expresses understanding future appointments will be removed if one appointment is missed as a no-show or if there are two last minute cancellations  The option of a virtual visit was explained to help avoid missing a session

## 2021-02-10 ENCOUNTER — OFFICE VISIT (OUTPATIENT)
Dept: PHYSICAL THERAPY | Facility: CLINIC | Age: 56
End: 2021-02-10
Payer: COMMERCIAL

## 2021-02-10 DIAGNOSIS — M25.551 RIGHT HIP PAIN: Primary | ICD-10-CM

## 2021-02-10 PROCEDURE — 97110 THERAPEUTIC EXERCISES: CPT | Performed by: PHYSICAL THERAPIST

## 2021-02-10 PROCEDURE — 97140 MANUAL THERAPY 1/> REGIONS: CPT | Performed by: PHYSICAL THERAPIST

## 2021-02-10 NOTE — PROGRESS NOTES
Daily Note     Today's date: 2/10/2021  Patient name: Hasmukh Weber  : 1965  MRN: [id number]  Referring provider: Jose M Estrada DO  Dx:   Encounter Diagnosis     ICD-10-CM    1  Right hip pain  M25 551                   Subjective: Pt's c/c is stiffness, he feels he is doing well  No c/o pain  Objective: See treatment diary below      Assessment: Tolerated treatment well  Patient demonstrated fatigue post treatment and is challenged by today's POC progressions w/o pain  Plan: Continue per plan of care        Precautions: CELENA precautions (posterior),   SOC: 2020  POC expiration: 2021  RE: 2021  FOTO: 2/10/2021  Daily Treatment log:  Date 2021 2/3/2021 2021 2021 2/10/2020  FOTO   Visit # 10 post-op  12-post op 13-post op 14-post-op   Manual 5'     10'   Stick rolling lat glut 5'   5' 5'  5'   Scar massage   2'  Prone glut TP rel w/ gentle hip ER/IR 5'   There Exer 25'  20' 20' 25' 25'   SL SLR abd    2" 2x10  2# 2x10   Stand HR off step  2x12        Figure 4 stretch  1'x2  1'x2   1'x2 W/ ball 1'x3   W/ ball 1'x2   SLR flex        Sup ham stret  W/ SOS 20"x3  W/ SOS 20"x3   W/ SOS 30"x2 W/ SOS 30"x2     Hip flexor stretch supine off edge Supine w/ SOS 30"x2 Supine w/ SOS 30"x4  Supine w/ SOS 30"x4  Supine w/ SOS 30"x3   Hip flex stretch on step  10"x10  10"x10   20"x3 20"x3     Sit to stand low mat 56 cm 2x10 squat taps    W/ B/L OH press 10# DB's 2x10   Supine leg press B/L 70# 2x10   Uni 30# 2x10  B/L 70# 3x10   Uni 30# 2x10  B/L 75# 3x10  Uni 35# 2x10 B/L 80# 3x10  Uni 40# 2x10 W/ hip abd blk B/L 80# 2x15  Uni 40# 2x10   Stat bike seat #11 L3x6' L5x6'  L6x6' L6x6'  L6x6'   Hip ER w/ ball btwn knees Red  Red 20x                     HEP        NMRed 15' 25'  20' 15' 10'   WB balance AP   20x  20x     Bridges w/ hip abd black 2x10       Clamshells w/ pillow 3# 2x12   Red TB 2x12 Red 2x12 Red 2x12     Stand quad set w/ ball at wall 5"x15        FSU w/ rail 10" 2x10 R/L 10" 2x10 R/L  6"+2" foam  2x10  W/ high knee 6"+2" foam  2x10  W/ high knee 6"+2" foam   Uni bridge w/ KtoC w/ SOS     2x10 R/L   Tandem walk cone taps   6 cones; 6x  8 cones; 4x   diag walk TB ankles grn  Grn 20'x2      Side step TB ankles grn Grn 20'x2  grn 20'x2 R/L     Feet on pball bridge  5" 2x10  5" 2x10     Modalities        CP to end                            Reviewed cancellation and no-show policy w/ patient  Pt expresses understanding future appointments will be removed if one appointment is missed as a no-show or if there are two last minute cancellations  The option of a virtual visit was explained to help avoid missing a session

## 2021-02-12 ENCOUNTER — OFFICE VISIT (OUTPATIENT)
Dept: PHYSICAL THERAPY | Facility: CLINIC | Age: 56
End: 2021-02-12
Payer: COMMERCIAL

## 2021-02-12 DIAGNOSIS — M25.551 RIGHT HIP PAIN: Primary | ICD-10-CM

## 2021-02-12 DIAGNOSIS — Z96.649 S/P REVISION OF TOTAL HIP: ICD-10-CM

## 2021-02-12 PROCEDURE — 97110 THERAPEUTIC EXERCISES: CPT | Performed by: PHYSICAL THERAPIST

## 2021-02-12 PROCEDURE — 97140 MANUAL THERAPY 1/> REGIONS: CPT | Performed by: PHYSICAL THERAPIST

## 2021-02-12 PROCEDURE — 97112 NEUROMUSCULAR REEDUCATION: CPT | Performed by: PHYSICAL THERAPIST

## 2021-02-12 NOTE — PROGRESS NOTES
Daily Note     Today's date: 2021  Patient name: Lisa Abarca  : 1965  MRN: [id number]  Referring provider: Génesis Chen DO  Dx:   Encounter Diagnosis     ICD-10-CM    1  Right hip pain  M25 551    2  S/P revision of total hip  Z96 649                   Subjective: Pt reports his c/c today is stiffness after riding in the truck to/from [employer] yesterday to  parts  He thinks stick rolling is helping the "hard" area over his incision  Objective: See treatment diary below      Assessment: Tolerated treatment well  Patient demonstrated fatigue post treatment and reports stick rolling seems helpful for tightness  Plan: Continue per plan of care  Precautions: CELENA precautions (posterior),   SOC: 2020  POC expiration: 2021  RE: 2021  FOTO: 2/10/2021  Daily Treatment log:  Date 2021       Visit # 10 post-op       Manual 10'       Stick rolling lat glut 10' glut and hip flexor       There Exer 20'       SL SLR abd  2# 2x10       Figure 4 stretch  W/ ball 1'x2       Sup ham stret        Hip flexor stretch supine off edge 20"x3       Hip flex stretch on step  20"x3       Supine leg press w/ hip abd blk b/L 80# 2x15  Uni 40# 2x10       Stat bike seat #11 L6x6'       Hip ER w/ ball btwn knees grn 20x                       HEP        NMRed 15'       Clamshells w/ pillow        Side step over hurdles 6 hurdles   3x ea way       FSU w/ rail w hi knee 10" 1x10 R/L  1x5 to fatigue       Uni bridge w/ KtoC w/ SOS 2x10 R/L       Tandem walk cone taps        diag walk TB ankles grn        Side step TB ankles grn 15'x3 R/L       Feet on pball bridge        Modalities        CP to end                            Reviewed cancellation and no-show policy w/ patient  Pt expresses understanding future appointments will be removed if one appointment is missed as a no-show or if there are two last minute cancellations   The option of a virtual visit was explained to help avoid missing a session  none Infliximab Counseling:  I discussed with the patient the risks of infliximab including but not limited to myelosuppression, immunosuppression, autoimmune hepatitis, demyelinating diseases, lymphoma, and serious infections.  The patient understands that monitoring is required including a PPD at baseline and must alert us or the primary physician if symptoms of infection or other concerning signs are noted.

## 2021-02-16 ENCOUNTER — OFFICE VISIT (OUTPATIENT)
Dept: PHYSICAL THERAPY | Facility: CLINIC | Age: 56
End: 2021-02-16
Payer: COMMERCIAL

## 2021-02-16 DIAGNOSIS — Z96.649 S/P REVISION OF TOTAL HIP: ICD-10-CM

## 2021-02-16 DIAGNOSIS — M25.551 RIGHT HIP PAIN: Primary | ICD-10-CM

## 2021-02-16 PROCEDURE — 97112 NEUROMUSCULAR REEDUCATION: CPT | Performed by: PHYSICAL THERAPIST

## 2021-02-16 PROCEDURE — 97110 THERAPEUTIC EXERCISES: CPT | Performed by: PHYSICAL THERAPIST

## 2021-02-16 PROCEDURE — 97140 MANUAL THERAPY 1/> REGIONS: CPT | Performed by: PHYSICAL THERAPIST

## 2021-02-16 NOTE — PROGRESS NOTES
Daily Note     Today's date: 2021  Patient name: Chiquita Buck  : 1965  MRN: [id number]  Referring provider: Shavon Christie DO  Dx:   Encounter Diagnosis     ICD-10-CM    1  Right hip pain  M25 551    2  S/P revision of total hip  Z96 649                   Subjective: Pt's c/c is stiffness, he has difficulty still getting his shoes/socks on indep  Objective: See treatment diary below      Assessment: Tolerated treatment well  Patient is challenged by balance activities and reports stretching helps w/ stiffness  Mild Trendellenberg pattern remains  Pt demonstrates more difficulty w/ lat step up vs fwd  Plan: Continue per plan of care  Pt to see Dr Oumou Ivory tomorrow  Precautions: CELENA precautions (posterior),   SOC: 2020  POC expiration: 2021  RE: 2021  FOTO: 2/10/2021  Daily Treatment log:  Date 2021      Visit # 10 post-op 11- post up      Manual 10'       Stick rolling lat glut 10' glut and hip flexor 10'      There Exer 20' 20'      SL SLR abd  2# 2x10 2# 2x10      Figure 4 stretch  W/ ball 1'x2 W/ ball 1'x3      Sup ham stret        Hip flexor stretch supine off edge 20"x3 20"x3      Hip flex stretch on step  20"x3 20"x2      Supine leg press w/ hip abd blk b/L 80# 2x15  Uni 40# 2x10 blk B/L 80# 2x10  Uni 40# 2x10      Stat bike seat #11 L6x6' L6x6'      Hip ER w/ ball btwn knees grn 20x       LSU  6" 2x10 R      Sit to stand taps w/ OH press  10# DB's 2x10      HEP        NMRed 15' 15'      Clamshells  grn 2x10      Side step over hurdles 6 hurdles   3x ea way       FSU w/ rail w hi knee 10" 1x10 R/L  1x5 to fatigue       Uni bridge w/ KtoC w/ SOS 2x10 R/L 2x10 R/L      Tandem walk  40'x2      diag walk TB ankles grn        Side step TB ankles grn 15'x3 R/L       Feet on pball bridge        WB balance AP & ML  1' each      Modalities        CP to end                            Reviewed cancellation and no-show policy w/ patient   Pt expresses understanding future appointments will be removed if one appointment is missed as a no-show or if there are two last minute cancellations  The option of a virtual visit was explained to help avoid missing a session

## 2021-02-17 ENCOUNTER — APPOINTMENT (OUTPATIENT)
Dept: RADIOLOGY | Facility: CLINIC | Age: 56
End: 2021-02-17
Payer: COMMERCIAL

## 2021-02-17 ENCOUNTER — OFFICE VISIT (OUTPATIENT)
Dept: OBGYN CLINIC | Facility: CLINIC | Age: 56
End: 2021-02-17

## 2021-02-17 VITALS
HEIGHT: 71 IN | DIASTOLIC BLOOD PRESSURE: 82 MMHG | HEART RATE: 74 BPM | SYSTOLIC BLOOD PRESSURE: 140 MMHG | BODY MASS INDEX: 39.81 KG/M2 | WEIGHT: 284.4 LBS

## 2021-02-17 DIAGNOSIS — Z96.649 S/P REVISION OF TOTAL HIP: ICD-10-CM

## 2021-02-17 DIAGNOSIS — Z96.641 AFTERCARE FOLLOWING RIGHT HIP JOINT REPLACEMENT SURGERY: ICD-10-CM

## 2021-02-17 DIAGNOSIS — Z47.1 AFTERCARE FOLLOWING RIGHT HIP JOINT REPLACEMENT SURGERY: ICD-10-CM

## 2021-02-17 DIAGNOSIS — Z96.649 S/P REVISION OF TOTAL HIP: Primary | ICD-10-CM

## 2021-02-17 PROCEDURE — 73502 X-RAY EXAM HIP UNI 2-3 VIEWS: CPT

## 2021-02-17 PROCEDURE — 99024 POSTOP FOLLOW-UP VISIT: CPT | Performed by: ORTHOPAEDIC SURGERY

## 2021-02-17 NOTE — PROGRESS NOTES
Assessment/Plan:  1  S/P revision of total hip  XR hip/pelv 2-3 vws right if performed   2  Aftercare following right hip joint replacement surgery       Scribe Attestation    I,:  Misti Ramirez am acting as a scribe while in the presence of the attending physician :       I,:  Dawn Sanford, DO personally performed the services described in this documentation    as scribed in my presence :         [address] [place] [address] Linda is a pleasant 67-year-old male who presents today for follow-up evaluation six weeks status post right revision total hip arthroplasty  I am very pleased with his imaging and his clinical presentation today in the office  We did discuss his leg length discrepancy which is not bothersome to him  I explained that this was necessary to obtain stability of his prosthesis during surgery  I further explained that we could have him fitted for a shoe lift in the future if he begins to bother him  He should continue with his efforts at formal therapy with discharge to home exercise program as appropriate  He no longer needs aspirin for DVT prophylaxis  He may return to driving at this time  We will see him back in six weeks for repeat clinical evaluation with right hip x-rays on arrival     Subjective: Follow-up evaluation six weeks status post right revision total hip arthroplasty    Patient ID: Chiquita Buck is a 55 y o  male who presents today follow-up evaluation six weeks status post right revision total hip arthroplasty  He states that he has been feeling great  He has been participating in formal therapy using aspirin for DVT prophylaxis  He is very pleased with his progress  He states that his preoperative pain has completely resolved  He has been able to participate in all activities of daily living and has been doing this without groin or thigh pain  He denies any new injury or trauma  Review of Systems   Constitutional: Negative for chills, fever and unexpected weight change     HENT: Negative for hearing loss, nosebleeds and sore throat  Eyes: Negative for pain, redness and visual disturbance  Respiratory: Negative for cough, shortness of breath and wheezing  Cardiovascular: Negative for chest pain, palpitations and leg swelling  Gastrointestinal: Negative for abdominal pain, nausea and vomiting  Endocrine: Negative for polyphagia and polyuria  Genitourinary: Negative for dysuria and hematuria  Musculoskeletal:        See HPI   Skin: Negative for rash and wound  Neurological: Negative for dizziness, numbness and headaches  Psychiatric/Behavioral: Negative for decreased concentration and suicidal ideas  The patient is not nervous/anxious            Past Medical History:   Diagnosis Date    Acquired pes planus     Arthralgia of ankle     Calcaneal spur     Chronic hip pain     Diabetes mellitus ([address] )     recently put on RX for elevated HGB A1C    Essential hypertension 2/28/2016    Flat foot     Flat foot 2/28/2016    GERD (gastroesophageal reflux disease)     Hallux valgus     High cholesterol     Hyperlipidemia 2/28/2016    Hypertension     Plantar fibromatosis     Posterior tibial tendinitis        Past Surgical History:   Procedure Laterality Date    COLONOSCOPY      CT EPIDURAL STEROID INJECTION (RANDI LUMBAR)  11/27/2019    CT EPIDURAL STEROID INJECTION (RANDI LUMBAR)  6/5/2020    EVACUATION OF HEMATOMA Left     knee    FL INJECTION RIGHT HIP (NON ARTHROGRAM)  10/8/2019    HAND SURGERY      reconstruction of tendons in hand s/p inury/laceration    OSTEOCHONDROMA EXCISION      left knee    [place] REVISE TOTAL HIP REPLACEMENT Right 1/4/2021    Procedure: ARTHROPLASTY HIP TOTAL REVISION - RIGHT;  Surgeon: Kathy Harris DO;  Location: [facility];  Service: Orthopedics    [place] TOTAL HIP ARTHROPLASTY Right 2/29/2016    Procedure: ARTHROPLASTY HIP TOTAL ANTERIOR;  Surgeon: Catalino Todd MD;  Location:  MAIN OR;  Service: Orthopedics       Family History   Problem Relation Age of Onset    Diabetes Mother     Atrial fibrillation Mother     Lymphoma Father     COPD Father     Heart disease Father        Social History     Occupational History    Not on file   Tobacco Use    Smoking status: Never Smoker    Smokeless tobacco: Never Used   Substance and Sexual Activity    Alcohol use: Yes     Alcohol/week: 28 0 standard drinks     Types: 28 Cans of beer per week    Drug use: No    Sexual activity: Not on file         Current Outpatient Medications:     b complex vitamins capsule, Take 1 capsule by mouth daily, Disp: , Rfl:     clotrimazole-betamethasone (LOTRISONE) 1-0 05 % cream, Apply 1 application topically 2 (two) times a day To affected area, Disp: , Rfl: 0    hydrochlorothiazide (MICROZIDE) 12 5 mg capsule, Take 12 5 mg by mouth daily, Disp: , Rfl:     Melatonin 1 MG CAPS, Take 3 mg by mouth , Disp: , Rfl:     metFORMIN (GLUCOPHAGE) 500 mg tablet, Take 500 mg by mouth daily with breakfast , Disp: , Rfl:     pantoprazole (PROTONIX) 40 mg tablet, , Disp: , Rfl:     simvastatin (ZOCOR) 40 mg tablet, Take by mouth, Disp: , Rfl:     acetaminophen (TYLENOL) 325 mg tablet, Take 3 tablets (975 mg total) by mouth every 8 (eight) hours (Patient not taking: Reported on 2/17/2021), Disp:  , Rfl: 0    aspirin 325 mg tablet, Take 1 tablet (325 mg total) by mouth 2 (two) times a day (Patient not taking: Reported on 2/17/2021), Disp: 84 tablet, Rfl: 0    losartan (COZAAR) 100 MG tablet, Take 100 mg by mouth, Disp: , Rfl:     oxyCODONE (ROXICODONE) 5 mg immediate release tablet, Take 1-2 tablets by mouth every 4-6 hours as needed for moderate to severe pain (Patient not taking: Reported on 1/20/2021), Disp: 60 tablet, Rfl: 0    No Known Allergies    Objective:  Vitals:    02/17/21 0818   BP: 140/82   Pulse: 74       Body mass index is 39 67 kg/m²  Right Hip Exam     Tenderness   The patient is experiencing no tenderness       Range of Motion   Abduction: normal Right hip abduction: 50  Adduction: normal Right hip adduction: 30    Flexion: normal Right hip flexion: 90    External rotation: normal Right hip external rotation: 40  Internal rotation: normal Right hip internal rotation: 10      Muscle Strength   Abduction: 5/5   Adduction: 5/5   Flexion: 5/5     Tests   ATTILA: negative    Other   Erythema: absent  Scars: present (well healed posterolateral hip scar)  Sensation: normal  Pulse: present    Comments:  Thigh and calf soft and non-tender  Grossly NVI  Mild edema around hip without sign of hematoma  Stinchfield: negative  FADIR: negative  2mm lengthened  Slightly lurching gait               Physical Exam  Vitals signs and nursing note reviewed  Constitutional:       Appearance: He is well-developed  HENT:      Head: Normocephalic and atraumatic  Eyes:      General: No scleral icterus  Conjunctiva/sclera: Conjunctivae normal    Neck:      Musculoskeletal: Normal range of motion and neck supple  Cardiovascular:      Rate and Rhythm: Normal rate  Pulmonary:      Effort: Pulmonary effort is normal  No respiratory distress  Musculoskeletal:      Comments: As noted in HPI   Skin:     General: Skin is warm and dry  Neurological:      Mental Status: He is alert and oriented to person, place, and time  Psychiatric:         Behavior: Behavior normal          I have personally reviewed pertinent films in PACS  X-ray of the right hip obtained on 02/17/2021 reviewed demonstrating a well-positioned and aligned revision total hip arthroplasty without evidence of failure  There is no acute fracture, dislocation, lytic or blastic lesion

## 2021-02-18 ENCOUNTER — OFFICE VISIT (OUTPATIENT)
Dept: PHYSICAL THERAPY | Facility: CLINIC | Age: 56
End: 2021-02-18
Payer: COMMERCIAL

## 2021-02-18 DIAGNOSIS — Z96.649 LOOSENING OF PROSTHETIC HIP, SUBSEQUENT ENCOUNTER: ICD-10-CM

## 2021-02-18 DIAGNOSIS — Z96.641 HISTORY OF TOTAL RIGHT HIP ARTHROPLASTY: ICD-10-CM

## 2021-02-18 DIAGNOSIS — T84.038D LOOSENING OF PROSTHETIC HIP, SUBSEQUENT ENCOUNTER: ICD-10-CM

## 2021-02-18 DIAGNOSIS — M25.551 RIGHT HIP PAIN: Primary | ICD-10-CM

## 2021-02-18 DIAGNOSIS — Z96.649 S/P REVISION OF TOTAL HIP: ICD-10-CM

## 2021-02-18 PROCEDURE — 97112 NEUROMUSCULAR REEDUCATION: CPT | Performed by: PHYSICAL THERAPIST

## 2021-02-18 PROCEDURE — 97140 MANUAL THERAPY 1/> REGIONS: CPT | Performed by: PHYSICAL THERAPIST

## 2021-02-18 PROCEDURE — 97110 THERAPEUTIC EXERCISES: CPT | Performed by: PHYSICAL THERAPIST

## 2021-02-18 NOTE — PROGRESS NOTES
Daily Note     Today's date: 2021  Patient name: Jaimie Loaiza  : 1965  MRN: [id number]  Referring provider: Alvin Melo DO  Dx:   Encounter Diagnosis     ICD-10-CM    1  Right hip pain  M25 551    2  S/P revision of total hip  Z96 649    3  Loosening of prosthetic hip, subsequent encounter  T84 038D     Z96 649    4  History of total right hip arthroplasty  Z96 641                   Subjective: Pt reports he saw Dr Arlene Solis yesterday who was pleased w/ his progress  They discussed he continue w/ PT, D/C to HEP when appropriate, possibly after the 5 appts he has scheduled  Objective: See treatment diary below      Assessment: Tolerated treatment well  Patient demonstrated fatigue post treatment and is challenged by program progression  Hip stiffness still makes putting shoes on difficult, so added manual stretching today  Plan: Continue per plan of care        Precautions: CELENA precautions (posterior),   SOC: 2020  POC expiration: 2021  RE: 2021  FOTO: 2/10/2021  Daily Treatment log:  Date 2021     Visit # 10 post-op 11- post up 12     Manual 10'  15'     Stick rolling lat glut 10' glut and hip flexor 10' 5'     SL hip ext; sup ham/pirif stretch   10'     There Exer 20' 21' 15'     SL SLR abd  2# 2x10 2# 2x10 2# 2x10     Figure 4 stretch  W/ ball 1'x2 W/ ball 1'x3      Sup ham stret        Hip flexor stretch supine off edge 20"x3 20"x3      Hip flex stretch on step  20"x3 20"x2      Supine leg press w/ hip abd blk b/L 80# 2x15  Uni 40# 2x10 blk B/L 80# 2x10  Uni 40# 2x10 blk B/L 85# 3x10   uni     Stat bike seat #11 L6x6' L6x6' L6x6'     Hip ER w/ ball btwn knees grn 20x       LSU  6" 2x10 R 8" w/ knee hike 2x10 R/L     Sit to stand taps w/ OH press  10# DB's 2x10      HEP        NMRed 15' 15' 15'     Clamshells  grn 2x10 grn 2x12     Side step over hurdles 6 hurdles   3x ea way  6 hurdles 3x each way     FSU w/ rail w hi knee 10" 1x10 R/L  1x5 to fatigue       Uni bridge w/ KtoC w/ SOS 2x10 R/L 2x10 R/L 2x10 R L     Tandem walk  40'x2      diag walk TB ankles grn        Side step TB ankles grn 15'x3 R/L       Feet on pball bridge        WB balance AP & ML  1' each      R SLS; L radha hip abd w/ PBall   5"x10     Modalities        CP to end                            Reviewed cancellation and no-show policy w/ patient  Pt expresses understanding future appointments will be removed if one appointment is missed as a no-show or if there are two last minute cancellations  The option of a virtual visit was explained to help avoid missing a session

## 2021-02-23 ENCOUNTER — EVALUATION (OUTPATIENT)
Dept: PHYSICAL THERAPY | Facility: CLINIC | Age: 56
End: 2021-02-23
Payer: COMMERCIAL

## 2021-02-23 DIAGNOSIS — Z96.649 S/P REVISION OF TOTAL HIP: ICD-10-CM

## 2021-02-23 DIAGNOSIS — M25.551 RIGHT HIP PAIN: Primary | ICD-10-CM

## 2021-02-23 PROCEDURE — 97112 NEUROMUSCULAR REEDUCATION: CPT | Performed by: PHYSICAL THERAPIST

## 2021-02-23 PROCEDURE — 97140 MANUAL THERAPY 1/> REGIONS: CPT | Performed by: PHYSICAL THERAPIST

## 2021-02-23 PROCEDURE — 97110 THERAPEUTIC EXERCISES: CPT | Performed by: PHYSICAL THERAPIST

## 2021-02-23 NOTE — PROGRESS NOTES
PT Re-Evaluation     Today's date: 2021  Patient name: Soledad Trejo  : 1965  MRN: [id number]  Referring provider: Charlette Orozco DO  Dx:   Encounter Diagnosis     ICD-10-CM    1  Right hip pain  M25 551    2  S/P revision of total hip  Z96 649                      Assessment  Assessment details: 2020: Soledad Trejo is a 48 y o  male who presents with pain, decreased strength, decreased ROM, decreased joint mobility and ambulatory dysfunction  Due to these impairments, patient has difficulty performing ADL's, recreational activities, work-related activities, engaging in social activities, ambulation, stair negotiation, lifting/carrying, transfers  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, he owns a [employer]  Patient's clinical presentation is consistent with their referring diagnosis of Loosening of prosthetic hip, subsequent encounter, Right hip pain, History of total right hip arthroplasty which is in need of revision  Plan: Ambulatory referral to Physical Therapy    Pre-op exam  Plan: Ambulatory referral to Physical Therapy  Patient has been educated in post-op contraindications / precautions, wound care, gait training, weight bearing status, home exercise program and plan of care  Patient would benefit from skilled physical therapy services to address their aforementioned functional limitations and progress towards prior level of function and independence with home exercise program      2021: Pt presents for first post-op visit w/ mild to moderate pain controlled w/ medication  He ambulates safely w/ RW  He presents w/ expected ROM and strength limitations  Redness around incision which reportedly existed pre-op  Pt feels like R leg is now longer, and was advised this feeling will likely resolve  He is an excellent candidate for PT  Continue PT per POC     2021: Pt is progressing well post CELENA revision   He still lacks hip ER strength and ROM which limits his ability to put on socks/shoes, abd strength which leads to Trendellenberg pattern of gait and general balance due to remaining hip weakness  He will benefit from continued PT w/ re-assessment in 4 weeks  2/23/2021: Pt is making gains in hip strength and mobility  He continues w/ limited hip ER making donning shoes/socks difficult, but indep  Pt remains limited w/ balance and continues w/ +Trendellenberg pattern when fatigued  Pt will benefit from completing his remaining scheduled visits through 3/4/2021  Impairments: abnormal gait, abnormal or restricted ROM, activity intolerance, impaired balance, impaired physical strength and weight-bearing intolerance  Other impairment: mild pain after prolonged standing on concrete  Functional limitations: resumed driving; has resumed recip stairs except for first thing in morning, no remaining difficulty w/ bed mobility and transfersUnderstanding of Dx/Px/POC: good   Prognosis: good    Goals  Short Term Goals to be accomplished in 4 weeks: (2/4/2020)  STG1: Pt will be I with HEP and understanding CELENA precautions - met  STG2: Pt will demo 50% improvement in hip AROM- met  STG3: Pt will demo 1/2 MMT strength inc hip - met  STG4: Pt will demo ease w/ bed mobility  - met  STG5: Pt to advance from ambulation w/ RW to [facility] or  AD level surfaces  - met     Long Term Goals to be accomplished in 12 weeks: (4/1/2021) - ongoing LTG's (met #4)  LTG1: Pt will achieve full hip AROM to allow reciprocal stairs and ease w/ LE dressing - able w/ mod difficulty  LTG2: Pt will demo hip strength 4+/5 or better to allow SLS x 30"  - notmet  LTG3: Pt will return to work, and will need to be able to ambulate on uneven terrain w/o AD to do so  - met  LTG4: Reduce pain to 0-2/10 w/ activity   - met  LTG5: Pt to demonstrate sit to stand transfers w/o UE assist to facilitate ease w/ squatting  - met        Plan  Plan details:       Patient would benefit from: PT eval and skilled physical therapy  Planned modality interventions: cryotherapy, thermotherapy: hydrocollator packs and unattended electrical stimulation  Planned therapy interventions: manual therapy, neuromuscular re-education, therapeutic activities, therapeutic exercise, home exercise program, stretching, patient education, postural training, balance/weight bearing training and functional ROM exercises  Frequency: 2x week  Plan of Care beginning date: 2020  Plan of Care expiration date: 2021  Treatment plan discussed with: patient        Subjective Evaluation    History of Present Illness  Mechanism of injury: 2020: Pt states his original CELENA was 2015 and never felt right  He went for a second opinion and he found out the prosthesis is loose and needs to be replaced  He is having a revision on 2021  He reports near constant pain and his function is significantly limited  He states it feels like he can feel the prosthetic loosely moving in his hip  CELENA revision planned for 2021: Pt reports his post-op pain is managed pretty well if he takes his medications consistently  He feels like his Right leg is longer than his left  Pt reports redness at R hip was present pre-op, is B/L and attributes it to wearing jeans all the time  States MD is aware of redness  2021: Pt reports decreasing R LE edema, still has ankle swelling, but less than before  He feels stiff upon waking in the morning and/or after prolonged sitting  His balance is not back to PLOF, and he still struggles w/ donning shoes/socks due to hip ROM  2021: Pt can now put shoes & socks on indep with mod difficulty and stairs are reciprocal except for first thing in the morning due to stiffness  Balance is still limited and minor pain occurs w/ prolonged standing on concrete  Pain  At best pain ratin  At worst pain rating: 3  Pain location: R lateral/posterior hip and thigh  Alleviating factors: moving around    Exacerbated by: 1st thing in a m ; after prolonged positions  Progression: improved    Social Support  Steps to enter house: yes (1+1 w/ post, no rail)  Stairs in house: yes (w/ rail on left then on right to ascend)   Lives with: spouse and adult children    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[employer])    Diagnostic Tests  X-ray: abnormal  Treatments  Current treatment: physical therapy  Patient Goals  Patient goals for therapy: decreased pain, return to sport/leisure activities and return to work  Patient goal: weight bear w/o pain        Objective    Flowsheet Rows      Most Recent Value   PT/OT G-Codes   Current Score  63   Projected Score  62       A/PROM:  R  R  R  R  L     2020  Hip flex sup  100  90  35*  80*  100  Hip abd sup  38  35  30  32*  32  Hip ER prone  16sit  10sit  5 sit  50  50  Hip IR prone  35sit  35sit  NT  20**  30    MMT:    R  R  R  R  L     2020  Hip flex   4+/5  4/5  3/5  4+/5*  5/5  Hip abd  5/5  4/5  2+/5  4+/5  5/5  Hip ER   4/5  4-/5  3/5  3+/5*  5/5  Hip IR   4+/5  4+/5  3+/5  4/5  5/5  Hip ext   5/5  5/5  3-/5  5/5  5/5  Knee flexion  5/5  5/5  4/5  5/5  5/5  Knee exten  5/5  4+/5  4/5  5/5  5/5      Balance:   2020  Tandem R post w/ EO:  30 sec (+) 30 sec (+) 30 sec (+) 30 sec (+)  Tandem L post w/ EO:  30 sec (+) 30 sec (+) 30 sec (+) 30 sec (+)  SLS R w/ EO:    10 sec  10 sec  unable  5 sec*  SLS L w/ EO:   20 sec  20 sec  20 sec  20 sec    Function: - ambulates w/o AD   2021-ambulates w/o AD, mild Trendellenberg pattern   2021-ambulates w/ RW; min difficulty w/ bed mobility and sit to stand; NT squatting today     2020- poor squat form w/ pain, pain upon standing after sitting,     TU sec w/o AD 2021   8 sec w/o AD 2021   15 seconds 2021 w/ RW   16 seconds 2020 w/o AD        Palpation: incision fully approximated; (-) Corey's  1/7/2021 - No reported glut tenderness; (-) Homen's  Post-op dressing in place  Redness in surrounding tissue into gluts, iliac crest - pt states this was present pre-op and MD is aware  12/28/2020 - TTP R lat gluts     Flexibility: mod quintanilla B/L hams, pirif/hip flexor mod/bharti quintanilla R  Precautions: CELENA precautions (posterior),   SOC: 12/28/2020  POC expiration: 4/1/2021  RE: 1/27/2021  FOTO: 2/10/2021  Daily Treatment log:  Date 2/12/2021 2/16/2021 2/18/2021 2/23/2021  RE/FOTO    Visit # 10 post-op 11- post up 12 13    Manual 10'  15' 15'    Stick rolling lat glut 10' glut and hip flexor 10' 5' 5'    SL hip ext; sup ham/pirif stretch   10' 10'    There Exer 20' 20' 15' 10'    SL SLR abd  2# 2x10 2# 2x10 2# 2x10 2 5# 2X10    Figure 4 stretch  W/ ball 1'x2 W/ ball 1'x3      Hip flexor stretch supine off edge 20"x3 20"x3      Hip flex stretch on step  20"x3 20"x2      Supine leg press w/ hip abd blk b/L 80# 2x15  Uni 40# 2x10 blk B/L 80# 2x10  Uni 40# 2x10 blk B/L 85# 3x10   uni     Stat bike seat #11 L6x6' L6x6' L6x6'     Hip ER w/ ball btwn knees grn 20x       LSU  6" 2x10 R 8" w/ knee hike 2x10 R/L 8" w/ knee hike 2x10 R/L    Sit to stand taps w/ OH press  10# DB's 2x10      HEP        NMRed 15' 15' 15' 20'    Clamshells  grn 2x10 grn 2x12 Blue 2x10    Side step over hurdles 6 hurdles   3x ea way  6 hurdles 3x each way     FSU w/ rail w hi knee 10" 1x10 R/L  1x5 to fatigue   8" +2" foam  2x10 R/L    Uni bridge w/ KtoC w/ SOS 2x10 R/L 2x10 R/L 2x10 R  L 2x10 R/L    Tandem walk  40'x2  40'x2    diag walk TB ankles grn        Side step TB ankles grn 15'x3 R/L       WB balance AP & ML  1' each      R SLS; L radha hip abd w/ PBall   5"x10 5"x10 R/L    SLS w/ 3 way cone taps    1x15 R/L    Modalities        CP to end                                Reviewed cancellation and no-show policy w/ patient   Pt expresses understanding future appointments will be removed if one appointment is missed as a no-show or if there are two last minute cancellations  The option of a virtual visit was explained to help avoid missing a session

## 2021-02-25 ENCOUNTER — OFFICE VISIT (OUTPATIENT)
Dept: PHYSICAL THERAPY | Facility: CLINIC | Age: 56
End: 2021-02-25
Payer: COMMERCIAL

## 2021-02-25 DIAGNOSIS — Z96.641 HISTORY OF TOTAL RIGHT HIP ARTHROPLASTY: ICD-10-CM

## 2021-02-25 DIAGNOSIS — Z96.649 S/P REVISION OF TOTAL HIP: ICD-10-CM

## 2021-02-25 DIAGNOSIS — Z96.649 LOOSENING OF PROSTHETIC HIP, SUBSEQUENT ENCOUNTER: ICD-10-CM

## 2021-02-25 DIAGNOSIS — M25.551 RIGHT HIP PAIN: Primary | ICD-10-CM

## 2021-02-25 DIAGNOSIS — T84.038D LOOSENING OF PROSTHETIC HIP, SUBSEQUENT ENCOUNTER: ICD-10-CM

## 2021-02-25 PROCEDURE — 97110 THERAPEUTIC EXERCISES: CPT

## 2021-02-25 PROCEDURE — 97140 MANUAL THERAPY 1/> REGIONS: CPT

## 2021-02-25 PROCEDURE — 97112 NEUROMUSCULAR REEDUCATION: CPT

## 2021-02-25 NOTE — PROGRESS NOTES
Daily Note     Today's date: 2021  Patient name: Lisa Abarca  : 1965  MRN: [id number]  Referring provider: Génesis Chen DO  Dx:   Encounter Diagnosis     ICD-10-CM    1  Right hip pain  M25 551    2  S/P revision of total hip  Z96 649    3  Loosening of prosthetic hip, subsequent encounter  T84 038D     Z96 649    4  History of total right hip arthroplasty  Z96 641                   Subjective: pt reports the hip is feeling good overall, just some noted tightness in his lateral hip on arrival       Objective: See treatment diary below      Assessment: Tolerated treatment well  Patient would benefit from continued PTpt would benefit from cont hip strengthening to help with functional balance impairments and resume a less antalgic gait pattern  Plan: Continue per plan of care        Precautions: CELENA precautions (posterior),   SOC: 2020  POC expiration: 2021  RE: 2021  FOTO: 2/10/2021  Daily Treatment log:  Date 2021  RE/FOTO 2021   Visit # 10 post-op 11- post up 12 13 14   Manual 10'  15' 15' 10'    Stick rolling lat glut 10' glut and hip flexor 10' 5' 5'    SL hip ext; sup ham/pirif stretch   10' 10' 10'    There Exer 20' 20' 15' 10' 15'   SL SLR abd  2# 2x10 2# 2x10 2# 2x10 2 5# 2X10 2# 2x10    SLR flex     2# 2x10    Figure 4 stretch  W/ ball 1'x2 W/ ball 1'x3      Hip flexor stretch supine off edge 20"x3 20"x3   20"x3    Hip flex stretch on step  20"x3 20"x2      Supine leg press w/ hip abd blk b/L 80# 2x15  Uni 40# 2x10 blk B/L 80# 2x10  Uni 40# 2x10 blk B/L 85# 3x10   uni  blk B/L 85# 3x10    Uni      Stat bike seat #11 L6x6' L6x6' L6x6'  L6x6'    Hip ER w/ ball btwn knees grn 20x       LSU  6" 2x10 R 8" w/ knee hike 2x10 R/L 8" w/ knee hike 2x10 R/L     Sit to stand taps w/ OH press  10# DB's 2x10      HEP        NMRed 15' 15' 15' 20' 20'   Clamshells  grn 2x10 grn 2x12 Blue 2x10 Blue 2x12    Side step over hurdles 6 hurdles   3x ea way  6 hurdles 3x each way     FSU w/ rail w hi knee 10" 1x10 R/L  1x5 to fatigue   8" +2" foam  2x10 R/L 8" 2x10 R/L    Uni bridge w/ KtoC w/ SOS 2x10 R/L 2x10 R/L 2x10 R  L 2x10 R/L 2x10 R/L    Tandem walk  40'x2  40'x2 40'x2    diag walk TB ankles grn        Side step TB ankles grn 15'x3 R/L       WB balance AP & ML  1' each      R SLS; L radha hip abd w/ PBall   5"x10 5"x10 R/L    SLS w/ 3 way cone taps    1x15 R/L 1x15 R/L    Modalities        CP to end                                Reviewed cancellation and no-show policy w/ patient  Pt expresses understanding future appointments will be removed if one appointment is missed as a no-show or if there are two last minute cancellations  The option of a virtual visit was explained to help avoid missing a session

## 2021-03-02 ENCOUNTER — OFFICE VISIT (OUTPATIENT)
Dept: PHYSICAL THERAPY | Facility: CLINIC | Age: 56
End: 2021-03-02
Payer: COMMERCIAL

## 2021-03-02 DIAGNOSIS — Z96.641 HISTORY OF TOTAL RIGHT HIP ARTHROPLASTY: ICD-10-CM

## 2021-03-02 DIAGNOSIS — Z96.649 LOOSENING OF PROSTHETIC HIP, SUBSEQUENT ENCOUNTER: ICD-10-CM

## 2021-03-02 DIAGNOSIS — Z96.649 S/P REVISION OF TOTAL HIP: ICD-10-CM

## 2021-03-02 DIAGNOSIS — M25.551 RIGHT HIP PAIN: Primary | ICD-10-CM

## 2021-03-02 DIAGNOSIS — T84.038D LOOSENING OF PROSTHETIC HIP, SUBSEQUENT ENCOUNTER: ICD-10-CM

## 2021-03-02 PROCEDURE — 97112 NEUROMUSCULAR REEDUCATION: CPT

## 2021-03-02 PROCEDURE — 97110 THERAPEUTIC EXERCISES: CPT

## 2021-03-02 NOTE — PROGRESS NOTES
Daily Note     Today's date: 3/2/2021  Patient name: James Covarrubias  : 1965  MRN: [id number]  Referring provider: Ara Stuart DO  Dx:   Encounter Diagnosis     ICD-10-CM    1  Right hip pain  M25 551    2  S/P revision of total hip  Z96 649    3  Loosening of prosthetic hip, subsequent encounter  T84 [id number]     Z96 649    4  History of total right hip arthroplasty  Z96 641                   Subjective: pt with no new complaints on arrival  Reports his hip has been feeling really good recently and only has difficulty bending down and having the ROM to tie his shoes        Objective: See treatment diary below      Assessment: Tolerated treatment well  Patient would benefit from continued PT pt struggles with dynamic SLS activities with R LE  Pt challenged with current program and would benefit from cont R hip stretching and strengthening to allow for ease with functional ADLs       Plan: Continue per plan of care        Precautions: CELENA precautions (posterior),   SOC: 2020  POC expiration: 2021  RE: 2021  FOTO: 2/10/2021  Daily Treatment log:  Date 3/2/2021       Visit # 15       Manual 5'       Stick rolling lat glut        SL hip ext; sup ham/pirif stretch Ext 5'        There Exer 25'       SL SLR abd  2# 2x10       SLR flex 2# 2x10         Figure 4 stretch  W/ ball 1'x2        Hip flexor stretch supine off edge 30"x3       Hip flex stretch on step         Supine leg press w/ hip abd blk b/L 85# 2x15  Uni 40# 2x10       Stat bike seat #11 L6x6'       Hip ER w/ ball btwn knees grn        LSU 8" 2x10 R/L         Sit to stand taps w/ OH press 10# DB's 2x10       HEP        NMRed 15'       Clamshells Blue        Side step over hurdles        FSU w/ rail w hi knee 8" 2x10 R/L        Uni bridge w/ KtoC w/ SOS        Bridges with TB abd  Blk        Tandem walk 40'x2        diag walk TB ankles grn        Side step TB ankles grn        WB balance AP & ML 1' each        R SLS; L radha hip abd w/ PBall SLS w/ 3 way cone taps                Modalities        CP to end                                Reviewed cancellation and no-show policy w/ patient  Pt expresses understanding future appointments will be removed if one appointment is missed as a no-show or if there are two last minute cancellations  The option of a virtual visit was explained to help avoid missing a session

## 2021-03-04 ENCOUNTER — OFFICE VISIT (OUTPATIENT)
Dept: PHYSICAL THERAPY | Facility: CLINIC | Age: 56
End: 2021-03-04
Payer: COMMERCIAL

## 2021-03-04 DIAGNOSIS — Z96.649 S/P REVISION OF TOTAL HIP: ICD-10-CM

## 2021-03-04 DIAGNOSIS — M25.551 RIGHT HIP PAIN: Primary | ICD-10-CM

## 2021-03-04 PROCEDURE — 97140 MANUAL THERAPY 1/> REGIONS: CPT | Performed by: PHYSICAL THERAPIST

## 2021-03-04 PROCEDURE — 97110 THERAPEUTIC EXERCISES: CPT | Performed by: PHYSICAL THERAPIST

## 2021-03-04 PROCEDURE — 97112 NEUROMUSCULAR REEDUCATION: CPT | Performed by: PHYSICAL THERAPIST

## 2021-03-04 NOTE — PROGRESS NOTES
PT Discharge    Today's date: 3/4/2021  Patient name: Lyric Park  : 1965  MRN: [id number]  Referring provider: Selma Cleaning DO  Dx:   Encounter Diagnosis     ICD-10-CM    1  Right hip pain  M25 551    2  S/P revision of total hip  Z96 649                      Assessment  Assessment details: 2020: Lyric Park is a 48 y o  male who presents with pain, decreased strength, decreased ROM, decreased joint mobility and ambulatory dysfunction  Due to these impairments, patient has difficulty performing ADL's, recreational activities, work-related activities, engaging in social activities, ambulation, stair negotiation, lifting/carrying, transfers  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, he owns a [employer]  Patient's clinical presentation is consistent with their referring diagnosis of Loosening of prosthetic hip, subsequent encounter, Right hip pain, History of total right hip arthroplasty which is in need of revision  Plan: Ambulatory referral to Physical Therapy    Pre-op exam  Plan: Ambulatory referral to Physical Therapy  Patient has been educated in post-op contraindications / precautions, wound care, gait training, weight bearing status, home exercise program and plan of care  Patient would benefit from skilled physical therapy services to address their aforementioned functional limitations and progress towards prior level of function and independence with home exercise program      2021: Pt presents for first post-op visit w/ mild to moderate pain controlled w/ medication  He ambulates safely w/ RW  He presents w/ expected ROM and strength limitations  Redness around incision which reportedly existed pre-op  Pt feels like R leg is now longer, and was advised this feeling will likely resolve  He is an excellent candidate for PT  Continue PT per POC     2021: Pt is progressing well post CELENA revision   He still lacks hip ER strength and ROM which limits his ability to put on socks/shoes, abd strength which leads to Trendellenberg pattern of gait and general balance due to remaining hip weakness  He will benefit from continued PT w/ re-assessment in 4 weeks  2/23/2021: Pt is making gains in hip strength and mobility  He continues w/ limited hip ER making donning shoes/socks difficult, but indep  Pt remains limited w/ balance and continues w/ +Trendellenberg pattern when fatigued  Pt will benefit from completing his remaining scheduled visits through 3/4/2021      3/4/2021: Pt has met all goals except SLS not yet 30" and does report some minor remaining stiffness w/ putting on socks and shoes (independently) and getting on/off the floor  He is appropriate to D/C to HEP  Impairments: abnormal gait, abnormal or restricted ROM and impaired balance  Other impairment: mild pain after prolonged standing on concrete  Functional limitations: resumed driving; has resumed recip stairs, no remaining difficulty w/ bed mobility and transfers or ambulation uneven terrain/inclies; c/c remains stiffnessUnderstanding of Dx/Px/POC: good   Prognosis: good    Goals  Short Term Goals to be accomplished in 4 weeks: (2/4/2020)  STG1: Pt will be I with HEP and understanding CELENA precautions - met  STG2: Pt will demo 50% improvement in hip AROM- met  STG3: Pt will demo 1/2 MMT strength inc hip - met  STG4: Pt will demo ease w/ bed mobility  - met  STG5: Pt to advance from ambulation w/ RW to [facility] or no AD level surfaces  - met     Long Term Goals to be accomplished in 12 weeks: (4/1/2021) - ongoing LTG's (met #4)  LTG1: Pt will achieve full hip AROM to allow reciprocal stairs and ease w/ LE dressing - met  LTG2: Pt will demo hip strength 4+/5 or better to allow SLS x 30"  - met MMT/not met SLS  LTG3: Pt will return to work, and will need to be able to ambulate on uneven terrain w/o AD to do so  - met  LTG4: Reduce pain to 0-2/10 w/ activity   - met  LTG5: Pt to demonstrate sit to stand transfers w/o UE assist to facilitate ease w/ squatting  - met        Plan  Plan details:       Planned therapy interventions: home exercise program  Plan of Care beginning date: 2020  Plan of Care expiration date: 2021  Treatment plan discussed with: patient        Subjective Evaluation    History of Present Illness  Mechanism of injury: 2020: Pt states his original CELENA was 2015 and never felt right  He went for a second opinion and he found out the prosthesis is loose and needs to be replaced  He is having a revision on 2021  He reports near constant pain and his function is significantly limited  He states it feels like he can feel the prosthetic loosely moving in his hip  CELENA revision planned for 2021: Pt reports his post-op pain is managed pretty well if he takes his medications consistently  He feels like his Right leg is longer than his left  Pt reports redness at R hip was present pre-op, is B/L and attributes it to wearing jeans all the time  States MD is aware of redness  2021: Pt reports decreasing R LE edema, still has ankle swelling, but less than before  He feels stiff upon waking in the morning and/or after prolonged sitting  His balance is not back to PLOF, and he still struggles w/ donning shoes/socks due to hip ROM  2021: Pt can now put shoes & socks on indep with mod difficulty and stairs are reciprocal except for first thing in the morning due to stiffness  Balance is still limited and minor pain occurs w/ prolonged standing on concrete  3/4/2021: Pt reports he is pain free most days, and stiffness is decreasing  He is now able to don shoes/socks indep  He reports minor remaining balance limitations  Pain  At best pain ratin  At worst pain ratin  Pain location: R lateral/posterior hip and thigh  Alleviating factors: moving around  Exacerbated by: 1st thing in a m ; after prolonged positions    Progression: improved    Social Support  Steps to enter house: yes (1+1 w/ post, no rail)  Stairs in house: yes (w/ rail on left then on right to ascend)   Lives with: spouse and adult children    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[employer])    Diagnostic Tests  X-ray: abnormal  Treatments  Current treatment: physical therapy  Patient Goals  Patient goals for therapy: decreased pain, return to sport/leisure activities and return to work  Patient goal: weight bear w/o pain        Objective     A/PROM:  R  R  R  R  R  L     3/4/2021 2021 2021 2021 2020 2020  Hip flex sup  100  100  90  35*  80*  100  Hip abd sup  38  38  35  30  32*  32  Hip ER prone  20sit  16sit  10sit  5 sit  50  50  Hip IR prone  35sit  35sit  35sit  NT  20**  30    MMT:    R  R  R  R  R  L     3/4/2021 2021 2021 2021 2020 2020  Hip flex   4+/5  4+/5  4/5  3/5  4+/5*  5/5  Hip abd  5/5  5/5  4/5  2+/5  4+/5  5/5  Hip ER   4+/5  4/5  4-/5  3/5  3+/5*  5/5  Hip IR   5/5  4+/5  4+/5  3+/5  4/5  5/5  Hip ext   5/5  5/5  5/5  3-/5  5/5  5/5  Knee flexion  5/5  5/5  5/5  4/5  5/5  5/5  Knee exten  5/5  5/5  4+/5  4/5  5/5  5/5      Balance:   3/4/2021 2021 2021 2021 2020  Tandem R post w/ EO: 30 sec (+)  30 sec (+) 30 sec (+) 30 sec (+) 30 sec (+)  Tandem L post w/ EO:  30 sec (+) 30 sec (+) 30 sec (+) 30 sec (+) 30 sec (+)  SLS R w/ EO:    10 sec  10 sec  10 sec  unable  5 sec*  SLS L w/ EO:   20 sec  20 sec  20 sec  20 sec  20 sec    Function: 3/4/2021 & 2021- ambulates w/o AD all surfaces w/ mild Trendellenberg pattern  2021-ambulates w/o AD, mild Trendellenberg pattern   2021-ambulates w/ RW; min difficulty w/ bed mobility and sit to stand; NT squatting today     2020- poor squat form w/ pain, pain upon standing after sitting,     TU sec w/o AD 3/4/2021    8 sec w/o AD 2021   8 sec w/o AD 2021   15 seconds 2021 w/ RW   16 seconds 2020 w/o AD        Palpation: incision fully approximated; (-) Homen's  1/7/2021 - No reported glut tenderness; (-) Homen's  Post-op dressing in place  Redness in surrounding tissue into gluts, iliac crest - pt states this was present pre-op and MD is aware  12/28/2020 - TTP R lat gluts     Flexibility: mod quintanilla B/L hams, pirif/hip flexor mod/bharti quintanilla R  Precautions: CELENA precautions (posterior),   SOC: 12/28/2020  POC expiration: 4/1/2021  RE: 1/27/2021  FOTO: 2/10/2021    Daily Treatment log:  Date 3/2/2021 3/4/2021      Visit # 15       Manual 5' 10'      Stick rolling lat glut  5'      SL hip ext; sup ham/pirif stretch Ext 5'  Pirif/hams 5'      There Exer 25' 15'      SL SLR abd  2# 2x10       SLR flex 2# 2x10         Figure 4 stretch  W/ ball 1'x2        Hip flexor stretch supine off edge 30"x3 W/ SOS 30"x3      Hip flex stretch on step         Supine leg press w/ hip abd blk b/L 85# 2x15  Uni 40# 2x10 blk B/L 85# 2x15  Uni 40# 2x10      Stat bike seat #11 L6x6' L6x6'      Hip ER w/ ball btwn knees grn        LSU w/ knee hike 8" 2x10 R/L  8" 2x10 R/L       Sit to stand taps w/ OH press 10# DB's 2x10       HEP        NMRed 15' 15'      Clamshells Blue        Side step over hurdles        FSU w/ rail w hi knee 8" 2x10 R/L        Uni bridge w/ KtoC w/ SOS  2x10 R/L      Tandem walk 40'x2  40'x2      diag walk TB ankles grn        Side step TB ankles grn        WB balance AP & ML 1' each        R SLS; L radha hip abd w/ PBall  5"x10 R/L      SLS w/ 3 way cone taps        SLS  10"x5      Modalities        CP to end                                  Reviewed cancellation and no-show policy w/ patient  Pt expresses understanding future appointments will be removed if one appointment is missed as a no-show or if there are two last minute cancellations  The option of a virtual visit was explained to help avoid missing a session

## 2021-03-31 ENCOUNTER — OFFICE VISIT (OUTPATIENT)
Dept: OBGYN CLINIC | Facility: CLINIC | Age: 56
End: 2021-03-31

## 2021-03-31 ENCOUNTER — APPOINTMENT (OUTPATIENT)
Dept: RADIOLOGY | Facility: CLINIC | Age: 56
End: 2021-03-31
Payer: COMMERCIAL

## 2021-03-31 VITALS
WEIGHT: 285 LBS | HEART RATE: 94 BPM | HEIGHT: 71 IN | DIASTOLIC BLOOD PRESSURE: 100 MMHG | SYSTOLIC BLOOD PRESSURE: 154 MMHG | BODY MASS INDEX: 39.9 KG/M2

## 2021-03-31 DIAGNOSIS — Z47.1 AFTERCARE FOLLOWING RIGHT HIP JOINT REPLACEMENT SURGERY: ICD-10-CM

## 2021-03-31 DIAGNOSIS — Z96.641 AFTERCARE FOLLOWING RIGHT HIP JOINT REPLACEMENT SURGERY: ICD-10-CM

## 2021-03-31 DIAGNOSIS — Z96.649 S/P REVISION OF TOTAL HIP: Primary | ICD-10-CM

## 2021-03-31 DIAGNOSIS — Z96.649 S/P REVISION OF TOTAL HIP: ICD-10-CM

## 2021-03-31 PROCEDURE — 99024 POSTOP FOLLOW-UP VISIT: CPT | Performed by: ORTHOPAEDIC SURGERY

## 2021-03-31 PROCEDURE — 73502 X-RAY EXAM HIP UNI 2-3 VIEWS: CPT

## 2021-03-31 RX ORDER — AMOXICILLIN 500 MG/1
2000 TABLET, FILM COATED ORAL
Qty: 4 TABLET | Refills: 2 | Status: SHIPPED | OUTPATIENT
Start: 2021-03-31 | End: 2021-06-29

## 2021-03-31 NOTE — PROGRESS NOTES
Assessment/Plan:  1  S/P revision of total hip  XR hip/pelv 2-3 vws right if performed    amoxicillin (AMOXIL) 500 MG tablet   2  Aftercare following right hip joint replacement surgery  amoxicillin (AMOXIL) 500 MG tablet      Olya Dave is a pleasant 60-year-old gentleman presenting today for follow-up 3 months after undergoing a revision right total hip arthroplasty  He has done exceptionally well in his recovery at this point  The prosthesis is stable on imaging and exam   His leg-length discrepancy he has mild and not significantly bothersome to him and does not affect his ability to perform activities of daily living or enjoyment  We encouraged him to continue with his home exercise program   He understands the need of antibiotic prophylaxis before any dental or GI procedures and was given a prescription for his a dentist appointment today  He may return in 9 months for re-evaluation with an x-ray on arrival of his right hip  This will vaibhav the anniversary of his surgery  He is welcome to call with any questions or concerns in the interim  All of his questions were addressed today  Subjective: 3 months s/p revision right CELENA    Patient ID: Mendez Liu is a 55 y o  male  Olya Dave is a pleasant 60-year-old presenting today for follow-up 3 months after undergoing a revision right total hip arthroplasty  He reports that he is doing very well  Does not have any activity related discomfort in the right hip or groin  He is not bothered by the leg length discrepancy and is able to perform his ADLs without restriction  He denies any new injuries or paresthesias in the right lower extremity        Review of Systems   Constitutional: Negative  HENT: Negative  Eyes: Negative  Respiratory: Negative  Cardiovascular: Negative  Gastrointestinal: Negative  Endocrine: Negative  Genitourinary: Negative  Musculoskeletal: Negative  Skin: Negative  Allergic/Immunologic: Negative  Neurological: Negative  Hematological: Negative  Psychiatric/Behavioral: Negative  Past Medical History:   Diagnosis Date    Acquired pes planus     Arthralgia of ankle     Calcaneal spur     Chronic hip pain     Diabetes mellitus ([address] )     recently put on RX for elevated HGB A1C    Essential hypertension 2/28/2016    Flat foot     Flat foot 2/28/2016    GERD (gastroesophageal reflux disease)     Hallux valgus     High cholesterol     Hyperlipidemia 2/28/2016    Hypertension     Plantar fibromatosis     Posterior tibial tendinitis        Past Surgical History:   Procedure Laterality Date    COLONOSCOPY      CT EPIDURAL STEROID INJECTION (RANDI LUMBAR)  11/27/2019    CT EPIDURAL STEROID INJECTION (RANDI LUMBAR)  6/5/2020    EVACUATION OF HEMATOMA Left     knee    FL INJECTION RIGHT HIP (NON ARTHROGRAM)  10/8/2019    HAND SURGERY      reconstruction of tendons in hand s/p inury/laceration    OSTEOCHONDROMA EXCISION      left knee    AL REVISE TOTAL HIP REPLACEMENT Right 1/4/2021    Procedure: ARTHROPLASTY HIP TOTAL REVISION - RIGHT;  Surgeon: Trevon Norman DO;  Location: [facility];  Service: Orthopedics    AL TOTAL HIP ARTHROPLASTY Right 2/29/2016    Procedure: ARTHROPLASTY HIP TOTAL ANTERIOR;  Surgeon: Dari Waller MD;  Location:  MAIN OR;  Service: Orthopedics       Family History   Problem Relation Age of Onset    Diabetes Mother     Atrial fibrillation Mother     Lymphoma Father     COPD Father     Heart disease Father        Social History     Occupational History    Not on file   Tobacco Use    Smoking status: Never Smoker    Smokeless tobacco: Never Used   Substance and Sexual Activity    Alcohol use:  Yes     Alcohol/week: 28 0 standard drinks     Types: 28 Cans of beer per week    Drug use: No    Sexual activity: Not on file         Current Outpatient Medications:     amoxicillin (AMOXIL) 500 MG tablet, Take 4 tablets (2,000 mg total) by mouth 60 minutes pre-procedure, Disp: 4 tablet, Rfl: 2    b complex vitamins capsule, Take 1 capsule by mouth daily, Disp: , Rfl:     clotrimazole-betamethasone (LOTRISONE) 1-0 05 % cream, Apply 1 application topically 2 (two) times a day To affected area, Disp: , Rfl: 0    hydrochlorothiazide (MICROZIDE) 12 5 mg capsule, Take 12 5 mg by mouth daily, Disp: , Rfl:     losartan (COZAAR) 100 MG tablet, Take 100 mg by mouth, Disp: , Rfl:     Melatonin 1 MG CAPS, Take 3 mg by mouth , Disp: , Rfl:     metFORMIN (GLUCOPHAGE) 500 mg tablet, Take 500 mg by mouth daily with breakfast , Disp: , Rfl:     pantoprazole (PROTONIX) 40 mg tablet, , Disp: , Rfl:     simvastatin (ZOCOR) 40 mg tablet, Take by mouth, Disp: , Rfl:     No Known Allergies    Objective:  Vitals:    03/31/21 0807   BP: 154/100   Pulse: 94       Body mass index is 39 75 kg/m²  Right Hip Exam     Tenderness   The patient is experiencing no tenderness  Range of Motion   Abduction: normal Right hip abduction: 50  Adduction: normal Right hip adduction: 30    Flexion: normal Right hip flexion: 90    External rotation: normal Right hip external rotation: 50  Internal rotation: normal Right hip internal rotation: 15      Muscle Strength   Abduction: 5/5   Adduction: 5/5   Flexion: 5/5     Tests   ATTILA: negative  Carolyn: negative    Other   Erythema: absent  Scars: present (well healed posterolateral hip scar)  Sensation: normal  Pulse: present    Comments:  Thigh and calf soft and non-tender  Grossly NVI  Edema resolved  Stingefield: negative  FADIR: negative  2mm longer RLE than LLE  Ambulates with slightly antalgic gait on right without assistive device            Physical Exam  Vitals signs and nursing note reviewed  Constitutional:       Appearance: He is well-developed  Comments: Body mass index is 39 75 kg/m²  HENT:      Head: Normocephalic and atraumatic        Right Ear: External ear normal       Left Ear: External ear normal    Eyes: Extraocular Movements: Extraocular movements intact  Neck:      Musculoskeletal: Normal range of motion  Cardiovascular:      Rate and Rhythm: Normal rate  Pulmonary:      Effort: Pulmonary effort is normal    Abdominal:      Palpations: Abdomen is soft  Musculoskeletal:      Comments: See ortho exam   Skin:     General: Skin is warm and dry  Neurological:      Mental Status: He is alert and oriented to person, place, and time  Mental status is at baseline  Psychiatric:         Mood and Affect: Mood normal          Behavior: Behavior normal          Thought Content: Thought content normal          Judgment: Judgment normal        I have personally reviewed pertinent films in PACS of the x-rays taken today of his right hip compared them to previous films  There is no change in alignment of the revision total hip, which is well aligned and affixed without evidence of loosening, lynn-prosthetic fracture, or other complication

## 2021-06-24 ENCOUNTER — PREP FOR PROCEDURE (OUTPATIENT)
Dept: GASTROENTEROLOGY | Facility: CLINIC | Age: 56
End: 2021-06-24

## 2021-06-24 DIAGNOSIS — Z86.010 HISTORY OF COLON POLYPS: Primary | ICD-10-CM

## 2021-06-24 DIAGNOSIS — D12.6 TUBULAR ADENOMA OF COLON: ICD-10-CM

## 2021-07-14 ENCOUNTER — TELEPHONE (OUTPATIENT)
Dept: PREADMISSION TESTING | Facility: HOSPITAL | Age: 56
End: 2021-07-14

## 2021-07-14 NOTE — PRE-PROCEDURE INSTRUCTIONS
Pre-Surgery Instructions:   Medication Instructions    b complex vitamins capsule Patient was instructed by Physician and understands   co-enzyme Q-10 30 MG capsule Patient was instructed by Physician and understands   hydrochlorothiazide (MICROZIDE) 12 5 mg capsule Patient was instructed by Physician and understands   losartan (COZAAR) 100 MG tablet Patient was instructed by Physician and understands   Melatonin 1 MG CAPS Patient was instructed by Physician and understands   pantoprazole (PROTONIX) 40 mg tablet Patient was instructed by Physician and understands   simvastatin (ZOCOR) 40 mg tablet Patient was instructed by Physician and understands

## 2021-07-20 PROCEDURE — U0005 INFEC AGEN DETEC AMPLI PROBE: HCPCS | Performed by: INTERNAL MEDICINE

## 2021-07-20 PROCEDURE — U0003 INFECTIOUS AGENT DETECTION BY NUCLEIC ACID (DNA OR RNA); SEVERE ACUTE RESPIRATORY SYNDROME CORONAVIRUS 2 (SARS-COV-2) (CORONAVIRUS DISEASE [COVID-19]), AMPLIFIED PROBE TECHNIQUE, MAKING USE OF HIGH THROUGHPUT TECHNOLOGIES AS DESCRIBED BY CMS-2020-01-R: HCPCS | Performed by: INTERNAL MEDICINE

## 2021-07-23 ENCOUNTER — TELEPHONE (OUTPATIENT)
Dept: GASTROENTEROLOGY | Facility: AMBULARY SURGERY CENTER | Age: 56
End: 2021-07-23

## 2021-07-26 ENCOUNTER — ANESTHESIA (OUTPATIENT)
Dept: GASTROENTEROLOGY | Facility: AMBULARY SURGERY CENTER | Age: 56
End: 2021-07-26

## 2021-07-26 ENCOUNTER — HOSPITAL ENCOUNTER (OUTPATIENT)
Dept: GASTROENTEROLOGY | Facility: AMBULARY SURGERY CENTER | Age: 56
Setting detail: OUTPATIENT SURGERY
Discharge: HOME | End: 2021-07-26
Attending: INTERNAL MEDICINE | Admitting: INTERNAL MEDICINE
Payer: COMMERCIAL

## 2021-07-26 ENCOUNTER — ANESTHESIA EVENT (OUTPATIENT)
Dept: GASTROENTEROLOGY | Facility: AMBULARY SURGERY CENTER | Age: 56
End: 2021-07-26

## 2021-07-26 VITALS
TEMPERATURE: 97.7 F | RESPIRATION RATE: 14 BRPM | HEIGHT: 71 IN | HEART RATE: 72 BPM | DIASTOLIC BLOOD PRESSURE: 98 MMHG | OXYGEN SATURATION: 97 % | SYSTOLIC BLOOD PRESSURE: 172 MMHG | BODY MASS INDEX: 39.75 KG/M2

## 2021-07-26 DIAGNOSIS — D12.6 TUBULAR ADENOMA OF COLON: ICD-10-CM

## 2021-07-26 DIAGNOSIS — Z86.010 HISTORY OF COLON POLYPS: ICD-10-CM

## 2021-07-26 PROCEDURE — 45385 COLONOSCOPY W/LESION REMOVAL: CPT | Performed by: INTERNAL MEDICINE

## 2021-07-26 PROCEDURE — 88305 TISSUE EXAM BY PATHOLOGIST: CPT | Performed by: PATHOLOGY

## 2021-07-26 RX ORDER — PROPOFOL 10 MG/ML
INJECTION, EMULSION INTRAVENOUS AS NEEDED
Status: DISCONTINUED | OUTPATIENT
Start: 2021-07-26 | End: 2021-07-26

## 2021-07-26 RX ORDER — SODIUM CHLORIDE, SODIUM LACTATE, POTASSIUM CHLORIDE, CALCIUM CHLORIDE 600; 310; 30; 20 MG/100ML; MG/100ML; MG/100ML; MG/100ML
125 INJECTION, SOLUTION INTRAVENOUS CONTINUOUS
Status: DISCONTINUED | OUTPATIENT
Start: 2021-07-26 | End: 2021-07-30 | Stop reason: HOSPADM

## 2021-07-26 RX ORDER — PROPOFOL 10 MG/ML
INJECTION, EMULSION INTRAVENOUS CONTINUOUS PRN
Status: DISCONTINUED | OUTPATIENT
Start: 2021-07-26 | End: 2021-07-26

## 2021-07-26 RX ORDER — LIDOCAINE HYDROCHLORIDE 10 MG/ML
INJECTION, SOLUTION EPIDURAL; INFILTRATION; INTRACAUDAL; PERINEURAL AS NEEDED
Status: DISCONTINUED | OUTPATIENT
Start: 2021-07-26 | End: 2021-07-26

## 2021-07-26 RX ADMIN — PROPOFOL 100 MG: 10 INJECTION, EMULSION INTRAVENOUS at 08:32

## 2021-07-26 RX ADMIN — LIDOCAINE HYDROCHLORIDE 50 MG: 10 INJECTION, SOLUTION EPIDURAL; INFILTRATION; INTRACAUDAL; PERINEURAL at 08:31

## 2021-07-26 RX ADMIN — PROPOFOL 138 MCG/KG/MIN: 10 INJECTION, EMULSION INTRAVENOUS at 08:36

## 2021-07-26 RX ADMIN — PROPOFOL 50 MG: 10 INJECTION, EMULSION INTRAVENOUS at 08:33

## 2021-07-26 RX ADMIN — SODIUM CHLORIDE, SODIUM LACTATE, POTASSIUM CHLORIDE, AND CALCIUM CHLORIDE 125 ML/HR: .6; .31; .03; .02 INJECTION, SOLUTION INTRAVENOUS at 08:10

## 2021-07-26 RX ADMIN — PROPOFOL 100 MG: 10 INJECTION, EMULSION INTRAVENOUS at 08:31

## 2021-07-26 RX ADMIN — PROPOFOL 50 MG: 10 INJECTION, EMULSION INTRAVENOUS at 08:36

## 2021-07-26 NOTE — H&P
History and Physical - [facility]  Keysha Hodgson 55 y o  male MRN: [id number]                  HPI: Keysha Hodgson is a 55y o  year old male who presents for colonoscopy due to history of colon polyps      REVIEW OF SYSTEMS: Per the HPI, and otherwise unremarkable      Historical Information   Past Medical History:   Diagnosis Date    Acquired pes planus     Arthralgia of ankle     Calcaneal spur     Chronic hip pain     Diabetes mellitus ([address] )     recently put on RX for elevated HGB A1C    Essential hypertension 2/28/2016    Flat foot     Flat foot 2/28/2016    GERD (gastroesophageal reflux disease)     Hallux valgus     High cholesterol     Hyperlipidemia 2/28/2016    Hypertension     Plantar fibromatosis     Posterior tibial tendinitis      Past Surgical History:   Procedure Laterality Date    COLONOSCOPY      CT EPIDURAL STEROID INJECTION (RANDI LUMBAR)  11/27/2019    CT EPIDURAL STEROID INJECTION (RANDI LUMBAR)  6/5/2020    EVACUATION OF HEMATOMA Left     knee    FL INJECTION RIGHT HIP (NON ARTHROGRAM)  10/8/2019    HAND SURGERY      reconstruction of tendons in hand s/p inury/laceration    OSTEOCHONDROMA EXCISION      left knee    FL REVISE TOTAL HIP REPLACEMENT Right 1/4/2021    Procedure: ARTHROPLASTY HIP TOTAL REVISION - RIGHT;  Surgeon: Rommel Gómez DO;  Location: [facility];  Service: Orthopedics    FL TOTAL HIP ARTHROPLASTY Right 2/29/2016    Procedure: ARTHROPLASTY HIP TOTAL ANTERIOR;  Surgeon: Jin Muro MD;  Location:  MAIN OR;  Service: Orthopedics     Social History   Social History     Substance and Sexual Activity   Alcohol Use Yes    Alcohol/week: 28 0 standard drinks    Types: 28 Cans of beer per week     Social History     Substance and Sexual Activity   Drug Use No     Social History     Tobacco Use   Smoking Status Never Smoker   Smokeless Tobacco Never Used     Family History   Problem Relation Age of Onset    Diabetes Mother     Atrial fibrillation Mother     Lymphoma Father     COPD Father     Heart disease Father        Meds/Allergies       Current Outpatient Medications:     b complex vitamins capsule    co-enzyme Q-10 30 MG capsule    hydrochlorothiazide (MICROZIDE) 12 5 mg capsule    losartan (COZAAR) 100 MG tablet    pantoprazole (PROTONIX) 40 mg tablet    Melatonin 1 MG CAPS    simvastatin (ZOCOR) 40 mg tablet    Current Facility-Administered Medications:     lactated ringers infusion, 125 mL/hr, Intravenous, Continuous, 125 mL/hr at 07/26/21 0810    No Known Allergies    Objective     BP (!) 174/95   Pulse 77   Temp 97 7 °F (36 5 °C) (Temporal)   Resp 16   Ht 5' 11" (1 803 m)   SpO2 95%   BMI 39 75 kg/m²       PHYSICAL EXAM    Gen: NAD  Head: NCAT  CV: RRR  CHEST: Clear  ABD: soft, NT/ND  EXT: no edema      ASSESSMENT/PLAN:  This is a 55y o  year old male here for colonoscopy, and he is stable and optimized for his procedure

## 2021-07-26 NOTE — ANESTHESIA POSTPROCEDURE EVALUATION
Post-Op Assessment Note    CV Status:  Stable  Pain Score: 0    Pain management: adequate     Mental Status:  Alert and awake   Hydration Status:  Euvolemic   PONV Controlled:  Controlled   Airway Patency:  Patent      Post Op Vitals Reviewed: Yes      Staff: Anesthesiologist, CRNA         No complications documented      BP     Temp      Pulse     Resp      SpO2

## 2021-07-26 NOTE — PERIOPERATIVE NURSING NOTE
Pt was given AVS and all discharge instructions, emphasis on no driving, drinking alcohol, and operating heavy machinery

## 2021-07-26 NOTE — ANESTHESIA PREPROCEDURE EVALUATION
Procedure:  COLONOSCOPY    Relevant Problems   CARDIO   (+) Essential hypertension   (+) Hyperlipidemia      ENDO   (+) Type 2 diabetes mellitus without complication, without long-term current use of insulin (HCC)      GI/HEPATIC   (+) Gastroesophageal reflux disease      /RENAL   (+) MICHAEL (acute kidney injury) (HCC)      HEMATOLOGY   (+) Anemia        Physical Exam    Airway    Mallampati score: II  TM Distance: >3 FB  Neck ROM: full     Dental   No notable dental hx     Cardiovascular  Cardiovascular exam normal    Pulmonary  Pulmonary exam normal     Other Findings        Anesthesia Plan  ASA Score- 2     Anesthesia Type- IV sedation with anesthesia with ASA Monitors  Additional Monitors:   Airway Plan:           Plan Factors-Exercise tolerance (METS): >4 METS  Chart reviewed  Imaging results reviewed  Existing labs reviewed  Patient summary reviewed  Patient is not a current smoker  Obstructive sleep apnea risk education given perioperatively  Induction-     Postoperative Plan-     Informed Consent- Anesthetic plan and risks discussed with patient  I personally reviewed this patient with the CRNA  Discussed and agreed on the Anesthesia Plan with the CRNA  Neyda Rush

## 2022-01-05 ENCOUNTER — OFFICE VISIT (OUTPATIENT)
Dept: OBGYN CLINIC | Facility: CLINIC | Age: 57
End: 2022-01-05
Payer: COMMERCIAL

## 2022-01-05 ENCOUNTER — APPOINTMENT (OUTPATIENT)
Dept: RADIOLOGY | Facility: CLINIC | Age: 57
End: 2022-01-05
Payer: COMMERCIAL

## 2022-01-05 VITALS
HEIGHT: 71 IN | WEIGHT: 293 LBS | HEART RATE: 80 BPM | SYSTOLIC BLOOD PRESSURE: 174 MMHG | DIASTOLIC BLOOD PRESSURE: 100 MMHG | BODY MASS INDEX: 41.02 KG/M2

## 2022-01-05 DIAGNOSIS — Z96.649 S/P REVISION OF TOTAL HIP: Primary | ICD-10-CM

## 2022-01-05 DIAGNOSIS — Z96.649 S/P REVISION OF TOTAL HIP: ICD-10-CM

## 2022-01-05 PROCEDURE — 99213 OFFICE O/P EST LOW 20 MIN: CPT | Performed by: ORTHOPAEDIC SURGERY

## 2022-01-05 PROCEDURE — 73502 X-RAY EXAM HIP UNI 2-3 VIEWS: CPT

## 2022-01-05 RX ORDER — HYDROCHLOROTHIAZIDE 12.5 MG/1
TABLET ORAL
COMMUNITY
Start: 2021-12-09

## 2022-01-05 NOTE — PROGRESS NOTES
Assessment/Plan:  1  S/P revision of total hip  XR hip/pelv 2-3 vws right if performed     Scotty Coffman is a very pleasant 60-year-old gentleman presenting 1 year undergoing a revision right total hip arthroplasty  He has done exceptionally well in his recovery  His prosthesis is stable on imaging and exam he is independent all activities of daily living and enjoyment  He has been compliant with antibiotic prophylaxis before dental in GI procedures  Since he is doing so well, he may now follow up on an as-needed basis  He will notify us of any upcoming appointments and contact us if he has any issues with his hip or groin  He expressed understanding all of his questions were addressed    Subjective: 1 year s/p revision right CELENA    Patient ID: Altagracia Esparza is a 55 y o  male  Scotty Coffman is a pleasant 60-year-old gentleman presenting today for his annual evaluation after a revision right total hip arthroplasty  He reports that he is doing well  He was able to work and do all activities of daily living and enjoyment this year with history form without any issues  He is independent with all activities of daily living and enjoyment without pain  He has been compliant with antibiotics before dental in GI procedures  He denies new injury or symptoms      Review of Systems   Constitutional: Negative  HENT: Negative  Eyes: Negative  Respiratory: Negative  Cardiovascular: Negative  Gastrointestinal: Negative  Endocrine: Negative  Genitourinary: Negative  Musculoskeletal: Negative  Skin: Negative  Allergic/Immunologic: Negative  Neurological: Negative  Hematological: Negative  Psychiatric/Behavioral: Negative        Past Medical History:   Diagnosis Date    Acquired pes planus     Arthralgia of ankle     Calcaneal spur     Chronic hip pain     Diabetes mellitus ([address] )     recently put on RX for elevated HGB A1C    Essential hypertension 2/28/2016    Flat foot     Flat foot 2/28/2016    GERD (gastroesophageal reflux disease)     Hallux valgus     High cholesterol     Hyperlipidemia 2/28/2016    Hypertension     Plantar fibromatosis     Posterior tibial tendinitis        Past Surgical History:   Procedure Laterality Date    COLONOSCOPY      CT EPIDURAL STEROID INJECTION (RANDI LUMBAR)  11/27/2019    CT EPIDURAL STEROID INJECTION (RANDI LUMBAR)  6/5/2020    EVACUATION OF HEMATOMA Left     knee    FL INJECTION RIGHT HIP (NON ARTHROGRAM)  10/8/2019    HAND SURGERY      reconstruction of tendons in hand s/p inury/laceration    OSTEOCHONDROMA EXCISION      left knee    MN REVISE TOTAL HIP REPLACEMENT Right 1/4/2021    Procedure: ARTHROPLASTY HIP TOTAL REVISION - RIGHT;  Surgeon: Daryn Pérez DO;  Location: [facility];  Service: Orthopedics    MN TOTAL HIP ARTHROPLASTY Right 2/29/2016    Procedure: ARTHROPLASTY HIP TOTAL ANTERIOR;  Surgeon: Rochelle Finn MD;  Location:  MAIN OR;  Service: Orthopedics       Family History   Problem Relation Age of Onset    Diabetes Mother     Atrial fibrillation Mother     Lymphoma Father     COPD Father     Heart disease Father        Social History     Occupational History    Not on file   Tobacco Use    Smoking status: Never Smoker    Smokeless tobacco: Never Used   Vaping Use    Vaping Use: Never used   Substance and Sexual Activity    Alcohol use:  Yes     Alcohol/week: 28 0 standard drinks     Types: 28 Cans of beer per week    Drug use: No    Sexual activity: Not on file         Current Outpatient Medications:     b complex vitamins capsule, Take 1 capsule by mouth daily, Disp: , Rfl:     co-enzyme Q-10 30 MG capsule, Take 30 mg by mouth 3 (three) times a day, Disp: , Rfl:     hydrochlorothiazide (HYDRODIURIL) 12 5 mg tablet, , Disp: , Rfl:     losartan (COZAAR) 100 MG tablet, Take 100 mg by mouth, Disp: , Rfl:     Melatonin 1 MG CAPS, Take 3 mg by mouth , Disp: , Rfl:     pantoprazole (PROTONIX) 40 mg tablet, , Disp: , Rfl:     simvastatin (ZOCOR) 40 mg tablet, Take by mouth, Disp: , Rfl:     No Known Allergies    Objective:  Vitals:    01/05/22 0754   BP: (!) 174/100   Pulse: 80       Body mass index is 40 87 kg/m²  Right Hip Exam     Tenderness   The patient is experiencing no tenderness  Range of Motion   Abduction: normal Right hip abduction: 50  Adduction: normal Right hip adduction: 30    Extension: normal Right hip extension: 0  Flexion: normal Right hip flexion: 105  External rotation: normal Right hip external rotation: 45  Internal rotation: normal Right hip internal rotation: 20      Muscle Strength   Abduction: 5/5   Adduction: 5/5   Flexion: 5/5     Tests   ATTILA: negative  Carolyn: negative    Other   Erythema: absent  Scars: present (well healed posterolateral hip scar)  Sensation: normal  Pulse: present    Comments:  Thigh and calf soft and non-tender  Grossly NVI  Edema resolved  [place]ing[place]ield: negative  FADIR: negative  2mm longer RLE than LLE  Ambulates with normal symmetrical gait            Physical Exam  Vitals and nursing note reviewed  Constitutional:       Appearance: He is well-developed  Comments: Body mass index is 40 87 kg/m²  HENT:      Head: Normocephalic and atraumatic  Right Ear: External ear normal       Left Ear: External ear normal    Cardiovascular:      Rate and Rhythm: Normal rate  Pulmonary:      Effort: Pulmonary effort is normal    Abdominal:      Palpations: Abdomen is soft  Musculoskeletal:      Cervical back: Normal range of motion  Comments: See ortho exam   Skin:     General: Skin is warm and dry  Neurological:      Mental Status: He is alert and oriented to person, place, and time  Psychiatric:         Behavior: Behavior normal          Thought Content:  Thought content normal          Judgment: Judgment normal        I have personally reviewed pertinent films in PACS of the x-rays taken today of his pelvis and right hip compared them to previous films  There is no significant changes of the revision prosthesis which remains well aligned well fixed    There is slight radiolucency proximally, but this could be projectional   No signs of periprosthetic fracture, loosening, or other complication

## 2022-02-01 ENCOUNTER — HOSPITAL ENCOUNTER (EMERGENCY)
Facility: HOSPITAL | Age: 57
Discharge: HOME | End: 2022-02-01
Attending: EMERGENCY MEDICINE
Payer: COMMERCIAL

## 2022-02-01 ENCOUNTER — APPOINTMENT (EMERGENCY)
Dept: RADIOLOGY | Facility: HOSPITAL | Age: 57
End: 2022-02-01
Payer: COMMERCIAL

## 2022-02-01 VITALS
BODY MASS INDEX: 40.56 KG/M2 | OXYGEN SATURATION: 97 % | DIASTOLIC BLOOD PRESSURE: 91 MMHG | RESPIRATION RATE: 17 BRPM | TEMPERATURE: 99.1 F | HEART RATE: 78 BPM | SYSTOLIC BLOOD PRESSURE: 148 MMHG | WEIGHT: 290.8 LBS

## 2022-02-01 DIAGNOSIS — R05.9 COUGH: Primary | ICD-10-CM

## 2022-02-01 LAB
ANION GAP SERPL CALCULATED.3IONS-SCNC: 7 MMOL/L (ref 4–13)
BASOPHILS # BLD AUTO: 0.03 THOUSANDS/ΜL (ref 0–0.1)
BASOPHILS NFR BLD AUTO: 0 % (ref 0–1)
BUN SERPL-MCNC: 16 MG/DL (ref 5–25)
CALCIUM SERPL-MCNC: 9.1 MG/DL (ref 8.3–10.1)
CHLORIDE SERPL-SCNC: 101 MMOL/L (ref 100–108)
CO2 SERPL-SCNC: 30 MMOL/L (ref 21–32)
CREAT SERPL-MCNC: 1.13 MG/DL (ref 0.6–1.3)
D DIMER PPP FEU-MCNC: 0.95 UG/ML FEU
EOSINOPHIL # BLD AUTO: 0.19 THOUSAND/ΜL (ref 0–0.61)
EOSINOPHIL NFR BLD AUTO: 2 % (ref 0–6)
ERYTHROCYTE [DISTWIDTH] IN BLOOD BY AUTOMATED COUNT: 11.9 % (ref 11.6–15.1)
GFR SERPL CREATININE-BSD FRML MDRD: 72 ML/MIN/1.73SQ M
GLUCOSE SERPL-MCNC: 151 MG/DL (ref 65–140)
HCT VFR BLD AUTO: 42.6 % (ref 36.5–49.3)
HGB BLD-MCNC: 14.5 G/DL (ref 12–17)
IMM GRANULOCYTES # BLD AUTO: 0.07 THOUSAND/UL (ref 0–0.2)
IMM GRANULOCYTES NFR BLD AUTO: 1 % (ref 0–2)
LYMPHOCYTES # BLD AUTO: 2.12 THOUSANDS/ΜL (ref 0.6–4.47)
LYMPHOCYTES NFR BLD AUTO: 25 % (ref 14–44)
MCH RBC QN AUTO: 30.3 PG (ref 26.8–34.3)
MCHC RBC AUTO-ENTMCNC: 34 G/DL (ref 31.4–37.4)
MCV RBC AUTO: 89 FL (ref 82–98)
MONOCYTES # BLD AUTO: 0.86 THOUSAND/ΜL (ref 0.17–1.22)
MONOCYTES NFR BLD AUTO: 10 % (ref 4–12)
NEUTROPHILS # BLD AUTO: 5.13 THOUSANDS/ΜL (ref 1.85–7.62)
NEUTS SEG NFR BLD AUTO: 62 % (ref 43–75)
NRBC BLD AUTO-RTO: 0 /100 WBCS
PLATELET # BLD AUTO: 410 THOUSANDS/UL (ref 149–390)
PMV BLD AUTO: 8.7 FL (ref 8.9–12.7)
POTASSIUM SERPL-SCNC: 3.8 MMOL/L (ref 3.5–5.3)
RBC # BLD AUTO: 4.78 MILLION/UL (ref 3.88–5.62)
SODIUM SERPL-SCNC: 138 MMOL/L (ref 136–145)
WBC # BLD AUTO: 8.4 THOUSAND/UL (ref 4.31–10.16)

## 2022-02-01 PROCEDURE — G1004 CDSM NDSC: HCPCS

## 2022-02-01 PROCEDURE — 85025 COMPLETE CBC W/AUTO DIFF WBC: CPT | Performed by: PHYSICIAN ASSISTANT

## 2022-02-01 PROCEDURE — 99284 EMERGENCY DEPT VISIT MOD MDM: CPT

## 2022-02-01 PROCEDURE — 96360 HYDRATION IV INFUSION INIT: CPT

## 2022-02-01 PROCEDURE — 80048 BASIC METABOLIC PNL TOTAL CA: CPT | Performed by: PHYSICIAN ASSISTANT

## 2022-02-01 PROCEDURE — 71275 CT ANGIOGRAPHY CHEST: CPT

## 2022-02-01 PROCEDURE — 36415 COLL VENOUS BLD VENIPUNCTURE: CPT | Performed by: PHYSICIAN ASSISTANT

## 2022-02-01 PROCEDURE — 99284 EMERGENCY DEPT VISIT MOD MDM: CPT | Performed by: PHYSICIAN ASSISTANT

## 2022-02-01 PROCEDURE — 85379 FIBRIN DEGRADATION QUANT: CPT | Performed by: PHYSICIAN ASSISTANT

## 2022-02-01 RX ORDER — BENZONATATE 100 MG/1
100 CAPSULE ORAL EVERY 8 HOURS
Qty: 21 CAPSULE | Refills: 0 | Status: SHIPPED | OUTPATIENT
Start: 2022-02-01

## 2022-02-01 RX ADMIN — SODIUM CHLORIDE 500 ML: 0.9 INJECTION, SOLUTION INTRAVENOUS at 16:33

## 2022-02-01 RX ADMIN — IOHEXOL 85 ML: 350 INJECTION, SOLUTION INTRAVENOUS at 17:03

## 2022-02-01 NOTE — ED PROVIDER NOTES
Ilene is a his concerned if History  Chief Complaint   Patient presents with    Cough     States started with Covid symptoms on 1/17 amd was positive test on 1/19  States has a cough until he almost passes out  Patient is a 26-year-old white male reports testing positive for COVID 13 days ago after experiencing 2 day history of chest congestion, body aches, decreased energy, cough  Presents today with complaint of paroxysms of cough to the point of almost passing out for the last couple days  States he ran out of his cough medication a couple days ago  He is not a smoker  He does not feel short of breath and he reports no pleuritic chest pain  Prior to Admission Medications   Prescriptions Last Dose Informant Patient Reported? Taking?    Melatonin 1 MG CAPS 2/1/2022 at Unknown time Self Yes Yes   Sig: Take 3 mg by mouth    b complex vitamins capsule 2/1/2022 at Unknown time Self Yes Yes   Sig: Take 1 capsule by mouth daily   co-enzyme Q-10 30 MG capsule 2/1/2022 at Unknown time  Yes Yes   Sig: Take 30 mg by mouth 3 (three) times a day   hydrochlorothiazide (HYDRODIURIL) 12 5 mg tablet 2/1/2022 at Unknown time  Yes Yes   losartan (COZAAR) 100 MG tablet   Yes No   Sig: Take 100 mg by mouth   pantoprazole (PROTONIX) 40 mg tablet 2/1/2022 at Unknown time Self Yes Yes   simvastatin (ZOCOR) 40 mg tablet 2/1/2022 at Unknown time Self Yes Yes   Sig: Take by mouth      Facility-Administered Medications: None       Past Medical History:   Diagnosis Date    Acquired pes planus     Arthralgia of ankle     Calcaneal spur     Chronic hip pain     COVID-19 01/19/2022    Diabetes mellitus ([address] )     recently put on RX for elevated HGB A1C    Essential hypertension 2/28/2016    Flat foot     Flat foot 2/28/2016    GERD (gastroesophageal reflux disease)     Hallux valgus     High cholesterol     Hyperlipidemia 2/28/2016    Hypertension     Plantar fibromatosis     Posterior tibial tendinitis Past Surgical History:   Procedure Laterality Date    COLONOSCOPY      CT EPIDURAL STEROID INJECTION (RANDI LUMBAR)  11/27/2019    CT EPIDURAL STEROID INJECTION (RANDI LUMBAR)  6/5/2020    EVACUATION OF HEMATOMA Left     knee    FL INJECTION RIGHT HIP (NON ARTHROGRAM)  10/8/2019    HAND SURGERY      reconstruction of tendons in hand s/p inury/laceration    OSTEOCHONDROMA EXCISION      left knee    [place] REVISE TOTAL HIP REPLACEMENT Right 1/4/2021    Procedure: ARTHROPLASTY HIP TOTAL REVISION - RIGHT;  Surgeon: Yohannes Ross DO;  Location: [address] [place] [address];  Service: Orthopedics    WI TOTAL HIP ARTHROPLASTY Right 2/29/2016    Procedure: ARTHROPLASTY HIP TOTAL ANTERIOR;  Surgeon: Anay Sorto MD;  Location:  MAIN OR;  Service: Orthopedics       Family History   Problem Relation Age of Onset    Diabetes Mother     Atrial fibrillation Mother     Lymphoma Father     COPD Father     Heart disease Father      I have reviewed and agree with the history as documented  E-Cigarette/Vaping    E-Cigarette Use Never User      E-Cigarette/Vaping Substances    Nicotine No     THC No     CBD No     Flavoring No     Other No     Unknown No      Social History     Tobacco Use    Smoking status: Never Smoker    Smokeless tobacco: Never Used   Vaping Use    Vaping Use: Never used   Substance Use Topics    Alcohol use: Yes     Alcohol/week: 28 0 standard drinks     Types: 28 Cans of beer per week    Drug use: No       Review of Systems   Constitutional: Negative for chills and fever  HENT: Negative for ear pain and sore throat  Respiratory: Positive for cough  Negative for shortness of breath  Cardiovascular: Negative for chest pain and palpitations  Gastrointestinal: Negative for abdominal pain, nausea and vomiting  Genitourinary: Negative for dysuria and hematuria  Musculoskeletal: Negative for arthralgias and back pain  Skin: Negative for color change and rash     Neurological: Negative for syncope and headaches  All other systems reviewed and are negative  Physical Exam  Physical Exam  Vitals and nursing note reviewed  Constitutional:       General: He is not in acute distress  Appearance: Normal appearance  He is not ill-appearing, toxic-appearing or diaphoretic  HENT:      Head: Normocephalic and atraumatic  Right Ear: Tympanic membrane, ear canal and external ear normal       Left Ear: Tympanic membrane, ear canal and external ear normal       Nose: Nose normal       Mouth/Throat:      Mouth: Mucous membranes are moist       Pharynx: Oropharynx is clear  Eyes:      Extraocular Movements: Extraocular movements intact  Conjunctiva/sclera: Conjunctivae normal       Pupils: Pupils are equal, round, and reactive to light  Cardiovascular:      Rate and Rhythm: Normal rate and regular rhythm  Pulses: Normal pulses  Heart sounds: Normal heart sounds  Pulmonary:      Effort: Pulmonary effort is normal       Breath sounds: Normal breath sounds  Abdominal:      General: Abdomen is flat  Bowel sounds are normal       Palpations: Abdomen is soft  Musculoskeletal:         General: Normal range of motion  Cervical back: Normal range of motion and neck supple  Skin:     General: Skin is warm and dry  Capillary Refill: Capillary refill takes less than 2 seconds  Neurological:      Mental Status: He is alert           Vital Signs  ED Triage Vitals [02/01/22 1430]   Temperature Pulse Respirations Blood Pressure SpO2   99 1 °F (37 3 °C) 86 20 140/90 95 %      Temp Source Heart Rate Source Patient Position - Orthostatic VS BP Location FiO2 (%)   Tympanic Monitor Sitting Left arm --      Pain Score       No Pain           Vitals:    02/01/22 1430 02/01/22 1530 02/01/22 1615 02/01/22 1745   BP: 140/90 159/88 155/94 157/90   Pulse: 86  76 80   Patient Position - Orthostatic VS: Sitting Lying  Sitting         Visual Acuity      ED Medications  Medications   sodium chloride 0 9 % bolus 500 mL (0 mL Intravenous Stopped 2/1/22 1747)   iohexol (OMNIPAQUE) 350 MG/ML injection (SINGLE-DOSE) 85 mL (85 mL Intravenous Given 2/1/22 1703)       Diagnostic Studies  Results Reviewed     Procedure Component Value Units Date/Time    D-Dimer [[id number]]  (Abnormal) Collected: 02/01/22 1534    Lab Status: Final result Specimen: Blood from Arm, Left Updated: 02/01/22 1559     D-Dimer, Quant 0 95 ug/ml FEU     Basic metabolic panel [[id number]]  (Abnormal) Collected: 02/01/22 1534    Lab Status: Final result Specimen: Blood from Arm, Left Updated: 02/01/22 1556     Sodium 138 mmol/L      Potassium 3 8 mmol/L      Chloride 101 mmol/L      CO2 30 mmol/L      ANION GAP 7 mmol/L      BUN 16 mg/dL      Creatinine 1 13 mg/dL      Glucose 151 mg/dL      Calcium 9 1 mg/dL      eGFR 72 ml/min/1 73sq m     Narrative:      [place] guidelines for Chronic Kidney Disease (CKD):     Stage 1 with normal or high GFR (GFR > 90 mL/min/1 73 square meters)    Stage 2 Mild CKD (GFR = 60-89 mL/min/1 73 square meters)    Stage 3A Moderate CKD (GFR = 45-59 mL/min/1 73 square meters)    Stage 3B Moderate CKD (GFR = 30-44 mL/min/1 73 square meters)    Stage 4 Severe CKD (GFR = 15-29 mL/min/1 73 square meters)    Stage 5 End Stage CKD (GFR <15 mL/min/1 73 square meters)  Note: GFR calculation is accurate only with a steady state creatinine    CBC and differential [[id number]]  (Abnormal) Collected: 02/01/22 1534    Lab Status: Final result Specimen: Blood from Arm, Left Updated: 02/01/22 1541     WBC 8 40 Thousand/uL      RBC 4 78 Million/uL      Hemoglobin 14 5 g/dL      Hematocrit 42 6 %      MCV 89 fL      MCH 30 3 pg      MCHC 34 0 g/dL      RDW 11 9 %      MPV 8 7 fL      Platelets 850 Thousands/uL      nRBC 0 /100 WBCs      Neutrophils Relative 62 %      Immat GRANS % 1 %      Lymphocytes Relative 25 %      Monocytes Relative 10 %      Eosinophils Relative 2 %      Basophils Relative 0 %      Neutrophils Absolute 5 13 Thousands/µL      Immature Grans Absolute 0 07 Thousand/uL      Lymphocytes Absolute 2 12 Thousands/µL      Monocytes Absolute 0 86 Thousand/µL      Eosinophils Absolute 0 19 Thousand/µL      Basophils Absolute 0 03 Thousands/µL                  CTA ED chest PE study   Final Result by Camila Paul MD (02/01 1747)      No evidence of pulmonary embolus  Mild scattered subpleural groundglass opacities consistent with Covid 19 related lung disease  Workstation performed: [id number]                    Procedures  Procedures         ED Course                               SBIRT 22yo+      Most Recent Value   SBIRT (22 yo +)    In order to provide better care to our patients, we are screening all of our patients for alcohol and drug use  Would it be okay to ask you these screening questions? Yes Filed at: 02/01/2022 1535   Initial Alcohol Screen: US AUDIT-C     1  How often do you have a drink containing alcohol? 1 Filed at: 02/01/2022 1535   2  How many drinks containing alcohol do you have on a typical day you are drinking? 0 Filed at: 02/01/2022 1535   3a  Male UNDER 65: How often do you have five or more drinks on one occasion? 0 Filed at: 02/01/2022 1535   3b  FEMALE Any Age, or MALE 65+: How often do you have 4 or more drinks on one occassion? 0 Filed at: 02/01/2022 1535   Audit-C Score 1 Filed at: 02/01/2022 1535   ULICES: How many times in the past year have you    Used an illegal drug or used a prescription medication for non-medical reasons? Never Filed at: 02/01/2022 1535                    MDM  Number of Diagnoses or Management Options  Diagnosis management comments: Cough    Pulse ox 99% room air  Well-appearing patient nontoxic  Lungs clear to auscultation    Will prescribe Tessalon Perles and have patient follow-up with primary care provider       Amount and/or Complexity of Data Reviewed  Tests in the radiology section of CPT®: reviewed  Decide to obtain previous medical records or to obtain history from someone other than the patient: yes  Review and summarize past medical records: yes  Independent visualization of images, tracings, or specimens: yes    Risk of Complications, Morbidity, and/or Mortality  Presenting problems: moderate  Diagnostic procedures: moderate  Management options: moderate    Patient Progress  Patient progress: stable      Disposition  Final diagnoses:   None     ED Disposition     None      Follow-up Information    None         Patient's Medications   Discharge Prescriptions    No medications on file       No discharge procedures on file      PDMP Review       Value Time User    PDMP Reviewed  Yes 1/5/2021  3:13 PM Aiden Gannon PA-C          ED Provider  Electronically Signed by           Aline Meng PA-C  02/01/22 4644

## 2022-02-01 NOTE — DISCHARGE INSTRUCTIONS
Follow-up with your primary care provider next 2-3 days if no improvement  Celeste Zamora has been prescribed your pharmacy    Return to the ED for fever, worsening cough or shortness of breath

## 2022-02-04 ENCOUNTER — OFFICE VISIT (OUTPATIENT)
Dept: PODIATRY | Facility: CLINIC | Age: 57
End: 2022-02-04
Payer: COMMERCIAL

## 2022-02-04 VITALS — WEIGHT: 290 LBS | RESPIRATION RATE: 17 BRPM | HEIGHT: 71 IN | BODY MASS INDEX: 40.6 KG/M2

## 2022-02-04 DIAGNOSIS — M21.961 ACQUIRED DEFORMITY OF RIGHT FOOT: ICD-10-CM

## 2022-02-04 DIAGNOSIS — M76.821 POSTERIOR TIBIAL TENDINITIS OF RIGHT LOWER EXTREMITY: Primary | ICD-10-CM

## 2022-02-04 DIAGNOSIS — M21.42 ACQUIRED FLAT FOOT, LEFT: ICD-10-CM

## 2022-02-04 DIAGNOSIS — M21.41 ACQUIRED FLAT FOOT, RIGHT: ICD-10-CM

## 2022-02-04 DIAGNOSIS — M21.962 ACQUIRED DEFORMITY OF LEFT FOOT: ICD-10-CM

## 2022-02-04 DIAGNOSIS — M76.822 POSTERIOR TIBIAL TENDINITIS OF LEFT LOWER EXTREMITY: ICD-10-CM

## 2022-02-04 PROCEDURE — L3000 FT INSERT UCB BERKELEY SHELL: HCPCS | Performed by: PODIATRIST

## 2022-02-04 PROCEDURE — 99202 OFFICE O/P NEW SF 15 MIN: CPT | Performed by: PODIATRIST

## 2022-02-04 NOTE — PROGRESS NOTES
Assessment/Plan:  Pain upon ambulation secondary to chronic posterior tibial tendinitis  Acquired pes planus bilateral          Diagnoses and all orders for this visit:    Posterior tibial tendinitis of right lower extremity    Posterior tibial tendinitis of left lower extremity    Acquired flat foot, right    Acquired flat foot, left    Acquired deformity of right foot    Acquired deformity of left foot          Subjective:     No Known Allergies      Current Outpatient Medications:     b complex vitamins capsule, Take 1 capsule by mouth daily, Disp: , Rfl:     benzonatate (TESSALON PERLES) 100 mg capsule, Take 1 capsule (100 mg total) by mouth every 8 (eight) hours, Disp: 21 capsule, Rfl: 0    co-enzyme Q-10 30 MG capsule, Take 30 mg by mouth 3 (three) times a day, Disp: , Rfl:     hydrochlorothiazide (HYDRODIURIL) 12 5 mg tablet, , Disp: , Rfl:     losartan (COZAAR) 100 MG tablet, Take 100 mg by mouth, Disp: , Rfl:     Melatonin 1 MG CAPS, Take 3 mg by mouth , Disp: , Rfl:     pantoprazole (PROTONIX) 40 mg tablet, , Disp: , Rfl:     simvastatin (ZOCOR) 40 mg tablet, Take by mouth, Disp: , Rfl:     Patient Active Problem List   Diagnosis    Essential hypertension    Flat foot    Hyperlipidemia    Pain in joints of both feet    Pain in both feet    Congenital pes planus    Difficulty walking    Left foot pain    Deformity of foot    Arthralgia of right foot    Arthralgia of left foot    Posterior tibial tendinitis of left lower extremity    Acquired deformity of foot    S/P revision of total hip    Type 2 diabetes mellitus without complication, without long-term current use of insulin (HCC)    MICHAEL (acute kidney injury) ([address] )    Hyponatremia    Anemia    Gastroesophageal reflux disease    Leg swelling          Patient ID: Nestor Turpin is a 65 y o  male      HPI    The following portions of the patient's history were reviewed and updated as appropriate:     family history includes Atrial fibrillation in his mother; COPD in his father; Diabetes in his mother; Heart disease in his father; Lymphoma in his father  reports that he has never smoked  He has never used smokeless tobacco  He reports current alcohol use of about 28 0 standard drinks of alcohol per week  He reports that he does not use drugs  Vitals:    02/04/22 0956   Resp: 17       Review of Systems      Objective:  Patient's shoes and socks removed  Foot ExamPhysical Exam         Physical Exam  Left Foot: Appearance: Normal except as noted: excessive [id number] planus  Great toe deformities include a bunion  The left foot demonstrates the metatarsus adductus  Tenderness: None except the medial longitudinal arch,[id number] of the plantar fascia, [id number] of the medial [id number] the posterior tibialis tendon  ROM: subtalar motion was restricted  Motor: Normal   Right Foot: Appearance: Normal except as noted: excessive pronation  Great toe deformities include a bunion  Left Ankle: Tenderness: None except the posterior tibialis tendon  ROM: limited ROM in all planes Motor: Normal    Right Ankle: ROM: limited ROM in all planes   Neurological Exam: performed  Light touch was intact bilaterally  Vibratory sensation was intact bilaterally  Response to monofilament test was intact bilaterally  Deep tendon reflexes: patellar reflex present [id number] reflex present bilaterally  Vascular Exam: performed Dorsalis pedis pulses were present bilaterally  Posterior tibial pulses were present bilaterally  Elevation Pallor: absent bilaterally  Dependence rubor was absent bilaterally  Edema: moderate meedhnxumusTWa25/4 pitting edema bilateral  Negative Homans sign bilateral    Toenails: All of the toenails were hypertrophied  Hyperkeratosis: present on both first toes        Foot ExamPhysical Exam   Musculoskeletal:   Complete collapse of left medial arch noted    There is pain with palpation posterior tibial tendon

## 2022-11-03 ENCOUNTER — TELEPHONE (OUTPATIENT)
Dept: OBGYN CLINIC | Facility: HOSPITAL | Age: 57
End: 2022-11-03

## 2022-11-03 NOTE — TELEPHONE ENCOUNTER
Caller: Patient    Doctor: Dr Kadi Vargas    Reason for call: Confirming that he doesn't have follow up appt with Dr Kadi Vargas in January  Confirmed       Call back#: N/A

## 2022-11-04 ENCOUNTER — HOSPITAL ENCOUNTER (EMERGENCY)
Facility: HOSPITAL | Age: 57
Discharge: HOME | End: 2022-11-04
Attending: EMERGENCY MEDICINE

## 2022-11-04 ENCOUNTER — APPOINTMENT (EMERGENCY)
Dept: RADIOLOGY | Facility: HOSPITAL | Age: 57
End: 2022-11-04

## 2022-11-04 VITALS
BODY MASS INDEX: 40.45 KG/M2 | TEMPERATURE: 97.7 F | RESPIRATION RATE: 20 BRPM | HEART RATE: 78 BPM | HEIGHT: 71 IN | OXYGEN SATURATION: 97 % | DIASTOLIC BLOOD PRESSURE: 79 MMHG | SYSTOLIC BLOOD PRESSURE: 142 MMHG

## 2022-11-04 DIAGNOSIS — K57.90 DIVERTICULOSIS: Primary | ICD-10-CM

## 2022-11-04 LAB
ALBUMIN SERPL BCP-MCNC: 3.4 G/DL (ref 3.5–5)
ALP SERPL-CCNC: 75 U/L (ref 46–116)
ALT SERPL W P-5'-P-CCNC: 43 U/L (ref 12–78)
ANION GAP SERPL CALCULATED.3IONS-SCNC: 8 MMOL/L (ref 4–13)
BASOPHILS # BLD AUTO: 0.05 THOUSANDS/ÂΜL (ref 0–0.1)
BASOPHILS NFR BLD AUTO: 1 % (ref 0–1)
BILIRUB SERPL-MCNC: 0.33 MG/DL (ref 0.2–1)
BUN SERPL-MCNC: 23 MG/DL (ref 5–25)
CALCIUM ALBUM COR SERPL-MCNC: 9.9 MG/DL (ref 8.3–10.1)
CALCIUM SERPL-MCNC: 9.4 MG/DL (ref 8.3–10.1)
CHLORIDE SERPL-SCNC: 103 MMOL/L (ref 96–108)
CO2 SERPL-SCNC: 29 MMOL/L (ref 21–32)
CREAT SERPL-MCNC: 1.1 MG/DL (ref 0.6–1.3)
EOSINOPHIL # BLD AUTO: 0.26 THOUSAND/ÂΜL (ref 0–0.61)
EOSINOPHIL NFR BLD AUTO: 3 % (ref 0–6)
ERYTHROCYTE [DISTWIDTH] IN BLOOD BY AUTOMATED COUNT: 12 % (ref 11.6–15.1)
GFR SERPL CREATININE-BSD FRML MDRD: 74 ML/MIN/1.73SQ M
GLUCOSE SERPL-MCNC: 151 MG/DL (ref 65–140)
HCT VFR BLD AUTO: 43.1 % (ref 36.5–49.3)
HGB BLD-MCNC: 14.8 G/DL (ref 12–17)
IMM GRANULOCYTES # BLD AUTO: 0.04 THOUSAND/UL (ref 0–0.2)
IMM GRANULOCYTES NFR BLD AUTO: 1 % (ref 0–2)
LYMPHOCYTES # BLD AUTO: 1.71 THOUSANDS/ÂΜL (ref 0.6–4.47)
LYMPHOCYTES NFR BLD AUTO: 22 % (ref 14–44)
MCH RBC QN AUTO: 31 PG (ref 26.8–34.3)
MCHC RBC AUTO-ENTMCNC: 34.3 G/DL (ref 31.4–37.4)
MCV RBC AUTO: 90 FL (ref 82–98)
MONOCYTES # BLD AUTO: 0.71 THOUSAND/ÂΜL (ref 0.17–1.22)
MONOCYTES NFR BLD AUTO: 9 % (ref 4–12)
NEUTROPHILS # BLD AUTO: 5.01 THOUSANDS/ÂΜL (ref 1.85–7.62)
NEUTS SEG NFR BLD AUTO: 64 % (ref 43–75)
NRBC BLD AUTO-RTO: 0 /100 WBCS
PLATELET # BLD AUTO: 375 THOUSANDS/UL (ref 149–390)
PMV BLD AUTO: 9.1 FL (ref 8.9–12.7)
POTASSIUM SERPL-SCNC: 3.9 MMOL/L (ref 3.5–5.3)
PROT SERPL-MCNC: 6.9 G/DL (ref 6.4–8.4)
RBC # BLD AUTO: 4.77 MILLION/UL (ref 3.88–5.62)
SODIUM SERPL-SCNC: 140 MMOL/L (ref 135–147)
WBC # BLD AUTO: 7.78 THOUSAND/UL (ref 4.31–10.16)

## 2022-11-04 RX ADMIN — IOHEXOL 100 ML: 350 INJECTION, SOLUTION INTRAVENOUS at 05:32

## 2022-11-04 NOTE — ED PROVIDER NOTES
History  Chief Complaint   Patient presents with   • Rectal Bleeding - Minor     Patient reports waking up at 1:30 am with pain (3 out of 10) on left side of abdomen  He went to the bathroom and had a bowel movement with blood in his stool  Patient did not take any medication for pain or other symptoms  Patient is a 27-year-old man with a prior medical history of hypertension, hyperlipidemia with complaint of left lower quadrant pain and hematochezia  Patient states the symptoms awaken him from sleep this morning  He was recently diagnosed with diverticulitis, by his primary care provider, finished a course of Cipro and Flagyl, completed clear liquid diet for approximately 1 week with resolution of symptoms  Patient states he resumed his usual diet and noticed left lower quadrant pain tonight  He had 2 bowel movements, 1st was associated with bright red blood, 2nd episode was diarrhea with no blood  He denies nausea or vomiting  History provided by:  Patient   used: No    Black or Bloody Stool  Quality:  Bright red  Amount:  Scant  Timing:  Intermittent  Chronicity:  New  Similar prior episodes: yes    Relieved by:  Nothing  Worsened by:  Nothing  Ineffective treatments:  None tried  Associated symptoms: abdominal pain    Associated symptoms: no fever and no vomiting    Abdominal pain:     Location:  LLQ    Quality: aching      Severity:  Moderate    Onset quality:  Sudden    Timing:  Constant    Progression:  Worsening      Prior to Admission Medications   Prescriptions Last Dose Informant Patient Reported? Taking?    Melatonin 1 MG CAPS  Self Yes No   Sig: Take 3 mg by mouth    b complex vitamins capsule  Self Yes No   Sig: Take 1 capsule by mouth daily   benzonatate (TESSALON PERLES) 100 mg capsule   No No   Sig: Take 1 capsule (100 mg total) by mouth every 8 (eight) hours   co-enzyme Q-10 30 MG capsule   Yes No   Sig: Take 30 mg by mouth 3 (three) times a day hydrochlorothiazide (HYDRODIURIL) 12 5 mg tablet   Yes No   losartan (COZAAR) 100 MG tablet   Yes No   Sig: Take 100 mg by mouth   pantoprazole (PROTONIX) 40 mg tablet  Self Yes No   simvastatin (ZOCOR) 40 mg tablet  Self Yes No   Sig: Take by mouth      Facility-Administered Medications: None       Past Medical History:   Diagnosis Date   • Acquired pes planus    • Arthralgia of ankle    • Calcaneal spur    • Chronic hip pain    • COVID-19 01/19/2022   • Diabetes mellitus ([address] )     recently put on RX for elevated HGB A1C   • Essential hypertension 2/28/2016   • Flat foot    • Flat foot 2/28/2016   • GERD (gastroesophageal reflux disease)    • Hallux valgus    • High cholesterol    • Hyperlipidemia 2/28/2016   • Hypertension    • Plantar fibromatosis    • Posterior tibial tendinitis        Past Surgical History:   Procedure Laterality Date   • COLONOSCOPY     • CT EPIDURAL STEROID INJECTION (RANDI LUMBAR)  11/27/2019   • CT EPIDURAL STEROID INJECTION (RANDI LUMBAR)  6/5/2020   • EVACUATION OF HEMATOMA Left     knee   • FL INJECTION RIGHT HIP (NON ARTHROGRAM)  10/8/2019   • HAND SURGERY      reconstruction of tendons in hand s/p inury/laceration   • OSTEOCHONDROMA EXCISION      left knee   • IA REVISE TOTAL HIP REPLACEMENT Right 1/4/2021    Procedure: ARTHROPLASTY HIP TOTAL REVISION - RIGHT;  Surgeon: Jael South DO;  Location: [facility];  Service: Orthopedics   • IA TOTAL HIP ARTHROPLASTY Right 2/29/2016    Procedure: ARTHROPLASTY HIP TOTAL ANTERIOR;  Surgeon: Cezar Case MD;  Location:  MAIN OR;  Service: Orthopedics       Family History   Problem Relation Age of Onset   • Diabetes Mother    • Atrial fibrillation Mother    • Lymphoma Father    • COPD Father    • Heart disease Father      I have reviewed and agree with the history as documented      E-Cigarette/Vaping   • E-Cigarette Use Never User      E-Cigarette/Vaping Substances   • Nicotine No    • THC No    • CBD No    • Flavoring No    • Other No    • Unknown No      Social History     Tobacco Use   • Smoking status: Never Smoker   • Smokeless tobacco: Never Used   Vaping Use   • Vaping Use: Never used   Substance Use Topics   • Alcohol use: Yes     Alcohol/week: 28 0 standard drinks     Types: 28 Cans of beer per week   • Drug use: No       Review of Systems   Constitutional: Negative for chills and fever  Respiratory: Negative for cough, shortness of breath and wheezing  Cardiovascular: Negative for chest pain and palpitations  Gastrointestinal: Positive for abdominal pain and blood in stool  Negative for constipation, diarrhea, nausea and vomiting  Genitourinary: Negative for dysuria, flank pain, hematuria and urgency  Musculoskeletal: Negative for back pain  Skin: Negative for color change and rash  All other systems reviewed and are negative  Physical Exam  Physical Exam  Vitals and nursing note reviewed  Constitutional:       Appearance: Normal appearance  He is well-developed  HENT:      Head: Normocephalic and atraumatic  Eyes:      Extraocular Movements: Extraocular movements intact  Pupils: Pupils are equal, round, and reactive to light  Cardiovascular:      Rate and Rhythm: Normal rate and regular rhythm  Heart sounds: Normal heart sounds  Pulmonary:      Effort: Pulmonary effort is normal       Breath sounds: Normal breath sounds  Abdominal:      General: Bowel sounds are normal  There is no distension  Palpations: Abdomen is soft  There is no mass  Tenderness: There is abdominal tenderness  There is no guarding or rebound  Musculoskeletal:      Cervical back: Normal range of motion and neck supple  Skin:     General: Skin is warm and dry  Capillary Refill: Capillary refill takes less than 2 seconds  Neurological:      General: No focal deficit present  Mental Status: He is alert and oriented to person, place, and time     Psychiatric:         Behavior: Behavior normal          Thought Content:  Thought content normal          Judgment: Judgment normal          Vital Signs  ED Triage Vitals   Temperature Pulse Respirations Blood Pressure SpO2   11/04/22 0348 11/04/22 0348 11/04/22 0348 11/04/22 0354 11/04/22 0348   97 7 °F (36 5 °C) 87 20 142/78 97 %      Temp Source Heart Rate Source Patient Position - Orthostatic VS BP Location FiO2 (%)   11/04/22 0348 11/04/22 0348 11/04/22 0354 11/04/22 0354 --   Oral Monitor Lying Right arm       Pain Score       11/04/22 0348       3           Vitals:    11/04/22 0348 11/04/22 0354 11/04/22 0615   BP:  142/78 142/79   Pulse: 87  78   Patient Position - Orthostatic VS:  Lying Lying         Visual Acuity      ED Medications  Medications   iohexol (OMNIPAQUE) 350 MG/ML injection (SINGLE-DOSE) 100 mL (100 mL Intravenous Given 11/4/22 0532)       Diagnostic Studies  Results Reviewed     Procedure Component Value Units Date/Time    Comprehensive metabolic panel [[id number]]  (Abnormal) Collected: 11/04/22 0421    Lab Status: Final result Specimen: Blood from Arm, Left Updated: 11/04/22 0447     Sodium 140 mmol/L      Potassium 3 9 mmol/L      Chloride 103 mmol/L      CO2 29 mmol/L      ANION GAP 8 mmol/L      BUN 23 mg/dL      Creatinine 1 10 mg/dL      Glucose 151 mg/dL      Calcium 9 4 mg/dL      Corrected Calcium 9 9 mg/dL      AST --     ALT 43 U/L      Alkaline Phosphatase 75 U/L      Total Protein 6 9 g/dL      Albumin 3 4 g/dL      Total Bilirubin 0 33 mg/dL      eGFR 74 ml/min/1 73sq m     Narrative:      Niesha guidelines for Chronic Kidney Disease (CKD):   •  Stage 1 with normal or high GFR (GFR > 90 mL/min/1 73 square meters)  •  Stage 2 Mild CKD (GFR = 60-89 mL/min/1 73 square meters)  •  Stage 3A Moderate CKD (GFR = 45-59 mL/min/1 73 square meters)  •  Stage 3B Moderate CKD (GFR = 30-44 mL/min/1 73 square meters)  •  Stage 4 Severe CKD (GFR = 15-29 mL/min/1 73 square meters)  •  Stage 5 End Stage CKD (GFR <15 mL/min/1 73 square meters)  Note: GFR calculation is accurate only with a steady state creatinine    CBC and differential [[id number]] Collected: 11/04/22 0421    Lab Status: Final result Specimen: Blood from Arm, Left Updated: 11/04/22 0429     WBC 7 78 Thousand/uL      RBC 4 77 Million/uL      Hemoglobin 14 8 g/dL      Hematocrit 43 1 %      MCV 90 fL      MCH 31 0 pg      MCHC 34 3 g/dL      RDW 12 0 %      MPV 9 1 fL      Platelets 873 Thousands/uL      nRBC 0 /100 WBCs      Neutrophils Relative 64 %      Immat GRANS % 1 %      Lymphocytes Relative 22 %      Monocytes Relative 9 %      Eosinophils Relative 3 %      Basophils Relative 1 %      Neutrophils Absolute 5 01 Thousands/µL      Immature Grans Absolute 0 04 Thousand/uL      Lymphocytes Absolute 1 71 Thousands/µL      Monocytes Absolute 0 71 Thousand/µL      Eosinophils Absolute 0 26 Thousand/µL      Basophils Absolute 0 05 Thousands/µL                  CT abdomen pelvis with contrast   Final Result by Dolly Ariza MD (11/04 6408)      Colonic diverticulosis without evidence of acute diverticulitis  Workstation performed: [id number]                    Procedures  Procedures         ED Course                               SBIRT 20yo+    Flowsheet Row Most Recent Value   SBIRT (25 yo +)    In order to provide better care to our patients, we are screening all of our patients for alcohol and drug use  Would it be okay to ask you these screening questions? Yes Filed at: 11/04/2022 0353   Initial Alcohol Screen: US AUDIT-C     1  How often do you have a drink containing alcohol? 4 Filed at: 11/04/2022 0353   2  How many drinks containing alcohol do you have on a typical day you are drinking? 1 Filed at: 11/04/2022 0353   3a  Male UNDER 65: How often do you have five or more drinks on one occasion? 0 Filed at: 11/04/2022 0353   Audit-C Score 5 Filed at: 11/04/2022 2618   ULICES: How many times in the past year have you        Used an illegal drug or used a prescription medication for non-medical reasons? Never Filed at: 11/04/2022 0353                    [facility]  Number of Diagnoses or Management Options  Diverticulosis: new and requires workup     Amount and/or Complexity of Data Reviewed  Clinical lab tests: ordered and reviewed  Tests in the radiology section of CPT®: ordered and reviewed    Risk of Complications, Morbidity, and/or Mortality  Presenting problems: high  Diagnostic procedures: high  Management options: high    Patient Progress  Patient progress: stable      Disposition  Final diagnoses:   Diverticulosis     Time reflects when diagnosis was documented in both MDM as applicable and the Disposition within this note     Time User Action Codes Description Comment    11/4/2022  6:06 AM Celester Bun Add [K57 90] Diverticulosis       ED Disposition     ED Disposition   Discharge    Condition   Stable    Date/Time   Fri Nov 4, 2022  6:05 AM    Comment   Sada Pedroza [facility] discharge to home/self care  Follow-up Information     Follow up With Specialties Details Why Contact Info Additional Information    Oma Carter DO Family Medicine Schedule an appointment as soon as possible for a visit  for follow up [address] [place], [place] [address]vivian [phone]       Elin Gonzalez [facility] Schedule an appointment as soon as possible for a visit in 1 day for follow up [address]  [phone]  Sayda Barber [facility] [facility] Cassia Larose [address], [address] [phone]          Patient's Medications   Discharge Prescriptions    No medications on file       No discharge procedures on file      PDMP Review       Value Time User    PDMP Reviewed  Yes 1/5/2021  3:13 PM Miky Gregory PA-C          ED Provider  Electronically Signed by           Joanne Fox DO  11/04/22 6829

## 2022-11-04 NOTE — DISCHARGE INSTRUCTIONS
Return to the ER for further concerns or worsening symptoms  Follow up with your primary care physician and GI in 1-2 days

## 2022-11-04 NOTE — Clinical Note
Roxy Tam was seen and treated in our emergency department on 11/4/2022  Diagnosis:     Gina Dsouza  may return to work on return date  He may return on this date: 11/06/2022         If you have any questions or concerns, please don't hesitate to call        Michelle Goel DO    ______________________________           _______________          _______________  Hospital Representative                              Date                                Time

## 2023-03-03 ENCOUNTER — TELEPHONE (OUTPATIENT)
Dept: GASTROENTEROLOGY | Facility: CLINIC | Age: 58
End: 2023-03-03

## 2023-03-03 ENCOUNTER — TELEPHONE (OUTPATIENT)
Dept: OTHER | Facility: OTHER | Age: 58
End: 2023-03-03

## 2023-03-03 ENCOUNTER — HOSPITAL ENCOUNTER (OUTPATIENT)
Dept: RADIOLOGY | Facility: HOSPITAL | Age: 58
Discharge: HOME | End: 2023-03-03

## 2023-03-03 ENCOUNTER — APPOINTMENT (OUTPATIENT)
Dept: LAB | Facility: HOSPITAL | Age: 58
End: 2023-03-03

## 2023-03-03 ENCOUNTER — OFFICE VISIT (OUTPATIENT)
Dept: GASTROENTEROLOGY | Facility: CLINIC | Age: 58
End: 2023-03-03

## 2023-03-03 VITALS
SYSTOLIC BLOOD PRESSURE: 151 MMHG | HEIGHT: 71 IN | DIASTOLIC BLOOD PRESSURE: 90 MMHG | HEART RATE: 78 BPM | BODY MASS INDEX: 36.4 KG/M2 | WEIGHT: 260 LBS

## 2023-03-03 DIAGNOSIS — K62.5 RECTAL BLEEDING: ICD-10-CM

## 2023-03-03 DIAGNOSIS — K57.90 DIVERTICULAR DISEASE: ICD-10-CM

## 2023-03-03 DIAGNOSIS — R10.9 LEFT SIDED ABDOMINAL PAIN: ICD-10-CM

## 2023-03-03 DIAGNOSIS — Z86.010 HISTORY OF COLON POLYPS: ICD-10-CM

## 2023-03-03 DIAGNOSIS — K21.9 GASTROESOPHAGEAL REFLUX DISEASE, UNSPECIFIED WHETHER ESOPHAGITIS PRESENT: ICD-10-CM

## 2023-03-03 DIAGNOSIS — R10.9 LEFT SIDED ABDOMINAL PAIN: Primary | ICD-10-CM

## 2023-03-03 LAB
ALBUMIN SERPL BCP-MCNC: 4.2 G/DL (ref 3.5–5)
ALP SERPL-CCNC: 63 U/L (ref 34–104)
ALT SERPL W P-5'-P-CCNC: 48 U/L (ref 7–52)
ANION GAP SERPL CALCULATED.3IONS-SCNC: 7 MMOL/L (ref 4–13)
AST SERPL W P-5'-P-CCNC: 29 U/L (ref 13–39)
BASOPHILS # BLD AUTO: 0.06 THOUSANDS/ÂΜL (ref 0–0.1)
BASOPHILS NFR BLD AUTO: 1 % (ref 0–1)
BILIRUB SERPL-MCNC: 0.45 MG/DL (ref 0.2–1)
BUN SERPL-MCNC: 22 MG/DL (ref 5–25)
CALCIUM SERPL-MCNC: 9.7 MG/DL (ref 8.4–10.2)
CHLORIDE SERPL-SCNC: 101 MMOL/L (ref 96–108)
CO2 SERPL-SCNC: 30 MMOL/L (ref 21–32)
CREAT SERPL-MCNC: 1.13 MG/DL (ref 0.6–1.3)
EOSINOPHIL # BLD AUTO: 0.21 THOUSAND/ÂΜL (ref 0–0.61)
EOSINOPHIL NFR BLD AUTO: 3 % (ref 0–6)
ERYTHROCYTE [DISTWIDTH] IN BLOOD BY AUTOMATED COUNT: 12.6 % (ref 11.6–15.1)
GFR SERPL CREATININE-BSD FRML MDRD: 71 ML/MIN/1.73SQ M
GLUCOSE SERPL-MCNC: 158 MG/DL (ref 65–140)
HCT VFR BLD AUTO: 44.3 % (ref 36.5–49.3)
HGB BLD-MCNC: 15.3 G/DL (ref 12–17)
IMM GRANULOCYTES # BLD AUTO: 0.04 THOUSAND/UL (ref 0–0.2)
IMM GRANULOCYTES NFR BLD AUTO: 1 % (ref 0–2)
LIPASE SERPL-CCNC: 17 U/L (ref 11–82)
LYMPHOCYTES # BLD AUTO: 2.29 THOUSANDS/ÂΜL (ref 0.6–4.47)
LYMPHOCYTES NFR BLD AUTO: 28 % (ref 14–44)
MCH RBC QN AUTO: 31.2 PG (ref 26.8–34.3)
MCHC RBC AUTO-ENTMCNC: 34.5 G/DL (ref 31.4–37.4)
MCV RBC AUTO: 90 FL (ref 82–98)
MONOCYTES # BLD AUTO: 0.9 THOUSAND/ÂΜL (ref 0.17–1.22)
MONOCYTES NFR BLD AUTO: 11 % (ref 4–12)
NEUTROPHILS # BLD AUTO: 4.77 THOUSANDS/ÂΜL (ref 1.85–7.62)
NEUTS SEG NFR BLD AUTO: 56 % (ref 43–75)
NRBC BLD AUTO-RTO: 0 /100 WBCS
PLATELET # BLD AUTO: 370 THOUSANDS/UL (ref 149–390)
PMV BLD AUTO: 9 FL (ref 8.9–12.7)
POTASSIUM SERPL-SCNC: 4.3 MMOL/L (ref 3.5–5.3)
PROT SERPL-MCNC: 6.7 G/DL (ref 6.4–8.4)
RBC # BLD AUTO: 4.9 MILLION/UL (ref 3.88–5.62)
SODIUM SERPL-SCNC: 138 MMOL/L (ref 135–147)
WBC # BLD AUTO: 8.27 THOUSAND/UL (ref 4.31–10.16)

## 2023-03-03 RX ORDER — SUCRALFATE 1 G/1
1 TABLET ORAL 4 TIMES DAILY
Qty: 120 TABLET | Refills: 0 | Status: SHIPPED | OUTPATIENT
Start: 2023-03-03

## 2023-03-03 RX ORDER — SUCRALFATE 1 G/1
1 TABLET ORAL 4 TIMES DAILY
Qty: 120 TABLET | Refills: 0 | Status: SHIPPED | OUTPATIENT
Start: 2023-03-03 | End: 2023-03-03

## 2023-03-03 RX ORDER — METFORMIN HYDROCHLORIDE 500 MG/1
TABLET, EXTENDED RELEASE ORAL
COMMUNITY
Start: 2023-01-30

## 2023-03-03 RX ADMIN — IOHEXOL 100 ML: 350 INJECTION, SOLUTION INTRAVENOUS at 13:25

## 2023-03-03 NOTE — PROGRESS NOTES
[address] [facility] - Outpatient Follow-up Note  Courtney Berry 63 y o  male MRN: [id number]  Encounter: [id number]          ASSESSMENT AND PLAN:      1  Left sided abdominal pain  Patient reports 2-week history of dull/intermittent left flank pain which started after possible gastroenteritis 3 weeks ago where patient had a subjective fever, abdominal pain, and watery diarrhea  Bowel movements have now returned back to normal 3-4 times a day but he continues with left flank pain  He had the same pain back in October and was empirically treated for suspected diverticulitis  Reports pain is worse after eating spicy/acidic foods  Was taking Advil PM for sleep a few months ago  Left-sided abdominal pain may be secondary to gastritis, PUD, diverticulitis     -Check labs and CT scan as below  -Start Carafate 4 times daily  -Avoid NSAIDs, reduce alcohol use  -Stick to a bland, low fiber diet  -EGD/colonoscopy ordered for further evaluation  If CT scan positive for diverticulitis will need to defer colonoscopy for 6 weeks until acute episode has subsided  -     CBC and differential; Future  -     Comprehensive metabolic panel; Future  -     Lipase; Future  -     sucralfate (CARAFATE) 1 g tablet; Take 1 tablet (1 g total) by mouth 4 (four) times a day  -     CT abdomen pelvis w contrast; Future; Expected date: 03/03/2023  -     EGD; Future; Expected date: 03/03/2023  -     Colonoscopy; Future; Expected date: 03/03/2023    2  Diverticular disease  Patient with history of sigmoid diverticulosis with suspected diverticulitis at the end of October treated empirically with Cipro/Flagyl by his PCP   -     Colonoscopy; Future; Expected date: 03/03/2023    3   Gastroesophageal reflux disease, unspecified whether esophagitis present  Patient reports longstanding history of GERD maintained on pantoprazole 40 mg daily but was frequently having breakthrough symptoms suspect due to frequent intake of spicy/acidic foods as well as history of alcohol use  He continues to drink up to 6 alcoholic beverages a day, but has cut out spicy/acidic foods and heartburn has now improved  Left flank pain also slightly improving, feels like a dull ache usually after eating  His last EGD was in 2016 showing antritis, gastric polyps, and mild erosive esophagitis  Polyps were fundic gland polyps  Esophageal bx showed chronic inflammation, negative for hunt's esophagus  Antral biopsy showed mild chronic gastritis and mild foveolar hyperplasia negative for H pylori  Left flank pain may be secondary to gastritis, PUD, diverticulitis  -Continue pantoprazole 40 mg daily  -Start Carafate 1 g 4 times daily  -Avoid NSAIDs  Patient reports he was taking Advil PM for a period of time months ago to help with sleep   -Reduce alcohol use  -Strict antireflux diet  -     EGD; Future; Expected date: 03/03/2023    4  Rectal bleeding  Patient reports recently having rectal bleeding in early November prompting presentation to the ED where CBC was normal   He denies any further rectal bleeding  He does have a history of internal hemorrhoids on last colonoscopy done in 2021, will repeat colonoscopy now as above due to recent diverticulitis flare   -     Colonoscopy; Future; Expected date: 03/03/2023    5  History of colon polyps  2 tubular adenoma polyps removed on last colonoscopy in July 2021      ______________________________________________________________________    SUBJECTIVE:  Sim Taylor is a 63 y o  male who presents for suspected diverticulitis flare  He reports symptoms started 2 weeks ago and he went on a liquid diet and symptoms seemed to improve so he advanced his diet to include some regular foods  He reports pain is in his left flank underneath his ribs which is a dull nagging pain and is intermittent in nature and worsened by spicy/tomato sauces   Three weeks ago he had a subjective fever, left flank pain, and watery non-bloody diarrhea without any nausea/vomiting  Since that time, he's had the pain  Reports his bowel movements are back to his normal now, but he continues with nagging abdominal discomfort  He's taking Protonix 40 mg daily and before symptoms started he was eating a lot of pizza with hot peppers, ross hot sauce with breakthrough heartburn symptoms, but has been very strict with his diet for the last 3 weeks  Reports very seldom NSAID use, but a few months ago he was taking Advil PM on a regular basis for sleep  He is drinking 6 alcoholic beverages per day-beer/mixed drinks  Denies any tobacco use, caffeine use  Last diverticulitis flare was at the end of October treated by his PCP with Cipro/Flagyl and then when he reintroduced a solid diet he had hematochezia prompting him to present to the ED where CT scan was done and negative for any signs of diverticulitis  CBC was normal  He reports after discharge bleeding cleared up and he hasn't had rectal bleeding since then  His last colonoscopy was in July 2021 with Dr Betsy Arriaga with 2 tubular adenoma polyps removed, sigmoid diverticulosis, and small internal hemorrhoids  Last EGD was in 2016 showing antritis, gastric polyps, and mild erosive esophagitis  Polyps were fundic gland polyps  Esophageal bx showed chronic inflammation, negative for hunt's esophagus  Antral biopsy showed mild chronic gastritis and mild foveolar hyperplasia negative for H pylori  REVIEW OF SYSTEMS IS OTHERWISE NEGATIVE        Historical Information   Past Medical History:   Diagnosis Date   • Acquired pes planus    • Arthralgia of ankle    • Calcaneal spur    • Chronic hip pain    • COVID-19 01/19/2022   • Diabetes mellitus ([address] )     recently put on RX for elevated HGB A1C   • Essential hypertension 2/28/2016   • Flat foot    • Flat foot 2/28/2016   • GERD (gastroesophageal reflux disease)    • Hallux valgus    • High cholesterol    • Hyperlipidemia 2/28/2016   • Hypertension • Plantar fibromatosis    • Posterior tibial tendinitis      Past Surgical History:   Procedure Laterality Date   • COLONOSCOPY     • CT EPIDURAL STEROID INJECTION (RANDI LUMBAR)  11/27/2019   • CT EPIDURAL STEROID INJECTION (RANDI LUMBAR)  6/5/2020   • EVACUATION OF HEMATOMA Left     knee   • FL INJECTION RIGHT HIP (NON ARTHROGRAM)  10/8/2019   • HAND SURGERY      reconstruction of tendons in hand s/p inury/laceration   • OSTEOCHONDROMA EXCISION      left knee   • DE ARTHRP ACETBLR/PROX FEM PROSTC AGRFT/ALGRFT Right 2/29/2016    Procedure: ARTHROPLASTY HIP TOTAL ANTERIOR;  Surgeon: Fortino Hoang MD;  Location:  MAIN OR;  Service: Orthopedics   • DE REVJ TOT HIP ARTHRP [address] W/WO AGRFT/ALGRFT Right 1/4/2021    Procedure: ARTHROPLASTY HIP TOTAL REVISION - RIGHT;  Surgeon: Paulino Webster DO;  Location: [facility];  Service: Orthopedics     Social History   Social History     Substance and Sexual Activity   Alcohol Use Yes   • Alcohol/week: 28 0 standard drinks   • Types: 28 Cans of beer per week     Social History     Substance and Sexual Activity   Drug Use No     Social History     Tobacco Use   Smoking Status Never   Smokeless Tobacco Never     Family History   Problem Relation Age of Onset   • Diabetes Mother    • Atrial fibrillation Mother    • Lymphoma Father    • COPD Father    • Heart disease Father        Meds/Allergies       Current Outpatient Medications:   •  b complex vitamins capsule  •  benzonatate (TESSALON PERLES) 100 mg capsule  •  co-enzyme Q-10 30 MG capsule  •  hydrochlorothiazide (HYDRODIURIL) 12 5 mg tablet  •  metFORMIN (GLUCOPHAGE-XR) 500 mg 24 hr tablet  •  pantoprazole (PROTONIX) 40 mg tablet  •  simvastatin (ZOCOR) 40 mg tablet  •  sucralfate (CARAFATE) 1 g tablet  •  losartan (COZAAR) 100 MG tablet  •  Melatonin 1 MG CAPS    No Known Allergies        Objective     Blood pressure 151/90, pulse 78, height 5' 11" (1 803 m), weight 118 kg (260 lb)  Body mass index is 36 26 kg/m²        PHYSICAL EXAM:      General Appearance:   Alert, cooperative, no distress   HEENT:   Normocephalic, atraumatic, anicteric  Neck:  Supple, symmetrical, trachea midline   Lungs:   Clear to auscultation bilaterally; no rales, rhonchi or wheezing; respirations unlabored    Heart[de-identified]   Regular rate and rhythm; no murmur  Abdomen:   Soft, L flank area w/ dull pain, no rigidity or guarding, non-distended; normal bowel sounds; no masses, no organomegaly    Genitalia:   Deferred    Rectal:   Deferred    Extremities:  No cyanosis, clubbing or edema    Skin:  No jaundice, rashes, or lesions    Lymph nodes:  No palpable cervical lymphadenopathy        Lab Results:   No visits with results within 1 Day(s) from this visit     Latest known visit with results is:   Admission on 11/04/2022, Discharged on 11/04/2022   Component Date Value   • WBC 11/04/2022 7 78    • RBC 11/04/2022 4 77    • Hemoglobin 11/04/2022 14 8    • Hematocrit 11/04/2022 43 1    • MCV 11/04/2022 90    • MCH 11/04/2022 31 0    • MCHC 11/04/2022 34 3    • RDW 11/04/2022 12 0    • MPV 11/04/2022 9 1    • Platelets 71/66/6050 375    • nRBC 11/04/2022 0    • Neutrophils Relative 11/04/2022 64    • Immat GRANS % 11/04/2022 1    • Lymphocytes Relative 11/04/2022 22    • Monocytes Relative 11/04/2022 9    • Eosinophils Relative 11/04/2022 3    • Basophils Relative 11/04/2022 1    • Neutrophils Absolute 11/04/2022 5 01    • Immature Grans Absolute 11/04/2022 0 04    • Lymphocytes Absolute 11/04/2022 1 71    • Monocytes Absolute 11/04/2022 0 71    • Eosinophils Absolute 11/04/2022 0 26    • Basophils Absolute 11/04/2022 0 05    • Sodium 11/04/2022 140    • Potassium 11/04/2022 3 9    • Chloride 11/04/2022 103    • CO2 11/04/2022 29    • ANION GAP 11/04/2022 8    • BUN 11/04/2022 23    • Creatinine 11/04/2022 1 10    • Glucose 11/04/2022 151 (H)    • Calcium 11/04/2022 9 4    • Corrected Calcium 11/04/2022 9 9    • AST 11/04/2022     • ALT 11/04/2022 43    • Alkaline Phosphatase 11/04/2022 75    • Total Protein 11/04/2022 6 9    • Albumin 11/04/2022 3 4 (L)    • Total Bilirubin 11/04/2022 0 33    • eGFR 11/04/2022 74          Radiology Results:   No results found

## 2023-03-03 NOTE — TELEPHONE ENCOUNTER
Patient is calling stating that the medication (carafate) he was prescibed today is on backorder at the pharmacy he uses  Encouraged patient to call other pharmacies to see if the medication is in stock somewhere else  He also would like to know if someone from the office can call him back in regards to potentially ordering another prescription?

## 2023-03-03 NOTE — TELEPHONE ENCOUNTER
Scheduled date of EGD/colonoscopy 03/16/2023  Physician performing EGD/colonoscopy: Hugo Loyd   Location of EGD/colonoscopy: [facility]   Desired bowel prep reviewed with patient: Doculax and mirilax   Instructions reviewed with patient by: philomena   Clearances:   No   Responsible  Clau Katz    phone [phone]

## 2023-03-03 NOTE — H&P (VIEW-ONLY)
[facility] - Outpatient Follow-up Note  Jesse Mayberry 63 y o  male MRN: [id number]  Encounter: [id number]          ASSESSMENT AND PLAN:      1  Left sided abdominal pain  Patient reports 2-week history of dull/intermittent left flank pain which started after possible gastroenteritis 3 weeks ago where patient had a subjective fever, abdominal pain, and watery diarrhea  Bowel movements have now returned back to normal 3-4 times a day but he continues with left flank pain  He had the same pain back in October and was empirically treated for suspected diverticulitis  Reports pain is worse after eating spicy/acidic foods  Was taking Advil PM for sleep a few months ago  Left-sided abdominal pain may be secondary to gastritis, PUD, diverticulitis     -Check labs and CT scan as below  -Start Carafate 4 times daily  -Avoid NSAIDs, reduce alcohol use  -Stick to a bland, low fiber diet  -EGD/colonoscopy ordered for further evaluation  If CT scan positive for diverticulitis will need to defer colonoscopy for 6 weeks until acute episode has subsided  -     CBC and differential; Future  -     Comprehensive metabolic panel; Future  -     Lipase; Future  -     sucralfate (CARAFATE) 1 g tablet; Take 1 tablet (1 g total) by mouth 4 (four) times a day  -     CT abdomen pelvis w contrast; Future; Expected date: 03/03/2023  -     EGD; Future; Expected date: 03/03/2023  -     Colonoscopy; Future; Expected date: 03/03/2023    2  Diverticular disease  Patient with history of sigmoid diverticulosis with suspected diverticulitis at the end of October treated empirically with Cipro/Flagyl by his PCP   -     Colonoscopy; Future; Expected date: 03/03/2023    3   Gastroesophageal reflux disease, unspecified whether esophagitis present  Patient reports longstanding history of GERD maintained on pantoprazole 40 mg daily but was frequently having breakthrough symptoms suspect due to frequent intake of spicy/acidic foods as well as history of alcohol use  He continues to drink up to 6 alcoholic beverages a day, but has cut out spicy/acidic foods and heartburn has now improved  Left flank pain also slightly improving, feels like a dull ache usually after eating  His last EGD was in 2016 showing antritis, gastric polyps, and mild erosive esophagitis  Polyps were fundic gland polyps  Esophageal bx showed chronic inflammation, negative for hunt's esophagus  Antral biopsy showed mild chronic gastritis and mild foveolar hyperplasia negative for H pylori  Left flank pain may be secondary to gastritis, PUD, diverticulitis  -Continue pantoprazole 40 mg daily  -Start Carafate 1 g 4 times daily  -Avoid NSAIDs  Patient reports he was taking Advil PM for a period of time months ago to help with sleep   -Reduce alcohol use  -Strict antireflux diet  -     EGD; Future; Expected date: 03/03/2023    4  Rectal bleeding  Patient reports recently having rectal bleeding in early November prompting presentation to the ED where CBC was normal   He denies any further rectal bleeding  He does have a history of internal hemorrhoids on last colonoscopy done in 2021, will repeat colonoscopy now as above due to recent diverticulitis flare   -     Colonoscopy; Future; Expected date: 03/03/2023    5  History of colon polyps  2 tubular adenoma polyps removed on last colonoscopy in July 2021      ______________________________________________________________________    SUBJECTIVE:  Musa Humphreys is a 63 y o  male who presents for suspected diverticulitis flare  He reports symptoms started 2 weeks ago and he went on a liquid diet and symptoms seemed to improve so he advanced his diet to include some regular foods  He reports pain is in his left flank underneath his ribs which is a dull nagging pain and is intermittent in nature and worsened by spicy/tomato sauces   Three weeks ago he had a subjective fever, left flank pain, and watery non-bloody diarrhea without any nausea/vomiting  Since that time, he's had the pain  Reports his bowel movements are back to his normal now, but he continues with nagging abdominal discomfort  He's taking Protonix 40 mg daily and before symptoms started he was eating a lot of pizza with hot peppers, ross hot sauce with breakthrough heartburn symptoms, but has been very strict with his diet for the last 3 weeks  Reports very seldom NSAID use, but a few months ago he was taking Advil PM on a regular basis for sleep  He is drinking 6 alcoholic beverages per day-beer/mixed drinks  Denies any tobacco use, caffeine use  Last diverticulitis flare was at the end of October treated by his PCP with Cipro/Flagyl and then when he reintroduced a solid diet he had hematochezia prompting him to present to the ED where CT scan was done and negative for any signs of diverticulitis  CBC was normal  He reports after discharge bleeding cleared up and he hasn't had rectal bleeding since then  His last colonoscopy was in July 2021 with Dr Nico Carrasco with 2 tubular adenoma polyps removed, sigmoid diverticulosis, and small internal hemorrhoids  Last EGD was in 2016 showing antritis, gastric polyps, and mild erosive esophagitis  Polyps were fundic gland polyps  Esophageal bx showed chronic inflammation, negative for hunt's esophagus  Antral biopsy showed mild chronic gastritis and mild foveolar hyperplasia negative for H pylori  REVIEW OF SYSTEMS IS OTHERWISE NEGATIVE        Historical Information   Past Medical History:   Diagnosis Date   • Acquired pes planus    • Arthralgia of ankle    • Calcaneal spur    • Chronic hip pain    • COVID-19 01/19/2022   • Diabetes mellitus ([address] )     recently put on RX for elevated HGB A1C   • Essential hypertension 2/28/2016   • Flat foot    • Flat foot 2/28/2016   • GERD (gastroesophageal reflux disease)    • Hallux valgus    • High cholesterol    • Hyperlipidemia 2/28/2016   • Hypertension • Plantar fibromatosis    • Posterior tibial tendinitis      Past Surgical History:   Procedure Laterality Date   • COLONOSCOPY     • CT EPIDURAL STEROID INJECTION (RANDI LUMBAR)  11/27/2019   • CT EPIDURAL STEROID INJECTION (RANDI LUMBAR)  6/5/2020   • EVACUATION OF HEMATOMA Left     knee   • FL INJECTION RIGHT HIP (NON ARTHROGRAM)  10/8/2019   • HAND SURGERY      reconstruction of tendons in hand s/p inury/laceration   • OSTEOCHONDROMA EXCISION      left knee   • FL ARTHRP ACETBLR/PROX FEM PROSTC AGRFT/ALGRFT Right 2/29/2016    Procedure: ARTHROPLASTY HIP TOTAL ANTERIOR;  Surgeon: Nazanin Disla MD;  Location:  MAIN OR;  Service: Orthopedics   • FL REVJ TOT HIP ARTHRP [address] W/WO AGRFT/ALGRFT Right 1/4/2021    Procedure: ARTHROPLASTY HIP TOTAL REVISION - RIGHT;  Surgeon: Marion Knight DO;  Location: [facility];  Service: Orthopedics     Social History   Social History     Substance and Sexual Activity   Alcohol Use Yes   • Alcohol/week: 28 0 standard drinks   • Types: 28 Cans of beer per week     Social History     Substance and Sexual Activity   Drug Use No     Social History     Tobacco Use   Smoking Status Never   Smokeless Tobacco Never     Family History   Problem Relation Age of Onset   • Diabetes Mother    • Atrial fibrillation Mother    • Lymphoma Father    • COPD Father    • Heart disease Father        Meds/Allergies       Current Outpatient Medications:   •  b complex vitamins capsule  •  benzonatate (TESSALON PERLES) 100 mg capsule  •  co-enzyme Q-10 30 MG capsule  •  hydrochlorothiazide (HYDRODIURIL) 12 5 mg tablet  •  metFORMIN (GLUCOPHAGE-XR) 500 mg 24 hr tablet  •  pantoprazole (PROTONIX) 40 mg tablet  •  simvastatin (ZOCOR) 40 mg tablet  •  sucralfate (CARAFATE) 1 g tablet  •  losartan (COZAAR) 100 MG tablet  •  Melatonin 1 MG CAPS    No Known Allergies        Objective     Blood pressure 151/90, pulse 78, height 5' 11" (1 803 m), weight 118 kg (260 lb)  Body mass index is 36 26 kg/m²        PHYSICAL EXAM:      General Appearance:   Alert, cooperative, no distress   HEENT:   Normocephalic, atraumatic, anicteric  Neck:  Supple, symmetrical, trachea midline   Lungs:   Clear to auscultation bilaterally; no rales, rhonchi or wheezing; respirations unlabored    Heart[de-identified]   Regular rate and rhythm; no murmur  Abdomen:   Soft, L flank area w/ dull pain, no rigidity or guarding, non-distended; normal bowel sounds; no masses, no organomegaly    Genitalia:   Deferred    Rectal:   Deferred    Extremities:  No cyanosis, clubbing or edema    Skin:  No jaundice, rashes, or lesions    Lymph nodes:  No palpable cervical lymphadenopathy        Lab Results:   No visits with results within 1 Day(s) from this visit     Latest known visit with results is:   Admission on 11/04/2022, Discharged on 11/04/2022   Component Date Value   • WBC 11/04/2022 7 78    • RBC 11/04/2022 4 77    • Hemoglobin 11/04/2022 14 8    • Hematocrit 11/04/2022 43 1    • MCV 11/04/2022 90    • MCH 11/04/2022 31 0    • MCHC 11/04/2022 34 3    • RDW 11/04/2022 12 0    • MPV 11/04/2022 9 1    • Platelets 54/73/8134 375    • nRBC 11/04/2022 0    • Neutrophils Relative 11/04/2022 64    • Immat GRANS % 11/04/2022 1    • Lymphocytes Relative 11/04/2022 22    • Monocytes Relative 11/04/2022 9    • Eosinophils Relative 11/04/2022 3    • Basophils Relative 11/04/2022 1    • Neutrophils Absolute 11/04/2022 5 01    • Immature Grans Absolute 11/04/2022 0 04    • Lymphocytes Absolute 11/04/2022 1 71    • Monocytes Absolute 11/04/2022 0 71    • Eosinophils Absolute 11/04/2022 0 26    • Basophils Absolute 11/04/2022 0 05    • Sodium 11/04/2022 140    • Potassium 11/04/2022 3 9    • Chloride 11/04/2022 103    • CO2 11/04/2022 29    • ANION GAP 11/04/2022 8    • BUN 11/04/2022 23    • Creatinine 11/04/2022 1 10    • Glucose 11/04/2022 151 (H)    • Calcium 11/04/2022 9 4    • Corrected Calcium 11/04/2022 9 9    • AST 11/04/2022     • ALT 11/04/2022 43    • Alkaline Phosphatase 11/04/2022 75    • Total Protein 11/04/2022 6 9    • Albumin 11/04/2022 3 4 (L)    • Total Bilirubin 11/04/2022 0 33    • eGFR 11/04/2022 74          Radiology Results:   No results found

## 2023-03-03 NOTE — TELEPHONE ENCOUNTER
The patient called informs that he wants the medication sucralfate (CARAFATE) 1 g tablet  to be sent to [facility] pharmacy in [place]

## 2023-03-03 NOTE — PATIENT INSTRUCTIONS
-Continue Pantoprazole 40mg daily before breakfast  -Start Carafate 1 tablet before meals and at bedtime  -Avoid NSAIDs -Aleeve, Advil, Naproxen etc   -Stick to a bland, low fiber diet  -Obtain labs/CT scan  -Reduce alcohol consumption  -We will schedule EGD/Colonoscopy      GERD (Gastroesophageal Reflux Disease)   WHAT YOU NEED TO KNOW:   Gastroesophageal reflux disease (GERD) is reflux that happens more than 2 times a week for a few weeks  Reflux means acid and food in your stomach back up into your esophagus  GERD can cause other health problems over time if it is not treated  DISCHARGE INSTRUCTIONS:   Call your local emergency number (911 in the [address] [place] [address],[address]) if:   You have severe chest pain and sudden trouble breathing  Return to the emergency department if:   You have trouble breathing after you vomit  You have trouble swallowing, or pain with swallowing  Your bowel movements are black, bloody, or tarry-looking  Your vomit looks like coffee grounds or has blood in it  Call your doctor or gastroenterologist if:   You feel full and cannot burp or vomit  You vomit large amounts, or you vomit often  You are losing weight without trying  Your symptoms get worse or do not improve with treatment  You have questions or concerns about your condition or care  Medicines:   Medicines  are used to decrease stomach acid  Medicine may also be used to help your lower esophageal sphincter and stomach contract (tighten) more  Take your medicine as directed  Contact your healthcare provider if you think your medicine is not helping or if you have side effects  Tell your provider if you are allergic to any medicine  Keep a list of the medicines, vitamins, and herbs you take  Include the amounts, and when and why you take them  Bring the list or the pill bottles to follow-up visits  Carry your medicine list with you in case of an emergency      Manage GERD:       Do not have foods or drinks that may increase heartburn  These include chocolate, peppermint, fried or fatty foods, drinks that contain caffeine, or carbonated drinks (soda)  Other foods include spicy foods, onions, tomatoes, and tomato-based foods  Do not have foods or drinks that can irritate your esophagus, such as citrus fruits, juices, and alcohol  Do not eat large meals  When you eat a lot of food at one time, your stomach needs more acid to digest it  Eat 6 small meals each day instead of 3 large ones, and eat slowly  Do not eat meals 2 to 3 hours before bedtime  Elevate the head of your bed  Place 6-inch blocks under the head of your bed frame  You may also use more than one pillow under your head and shoulders while you sleep  Maintain a healthy weight  If you are overweight, weight loss may help relieve symptoms of GERD  Do not smoke  Smoking weakens the lower esophageal sphincter and increases the risk of GERD  Ask your healthcare provider for information if you currently smoke and need help to quit  E-cigarettes or smokeless tobacco still contain nicotine  Talk to your healthcare provider before you use these products  Do not put pressure on your abdomen  Pressure pushes acid up into your esophagus  Do not wear clothing that is tight around your waist  Do not bend over  Bend at the knees if you need to pick something up  Follow up with your doctor or gastroenterologist as directed:  Write down your questions so you remember to ask them during your visits  © Copyright [employer] 2022 Information is for End User's use only and may not be sold, redistributed or otherwise used for commercial purposes  The above information is an  only  It is not intended as medical advice for individual conditions or treatments  Talk to your doctor, nurse or pharmacist before following any medical regimen to see if it is safe and effective for you      Diverticulitis Diet   WHAT YOU NEED TO KNOW:   What is a diverticulitis diet?  A diverticulitis diet includes foods that allow your intestines to rest while you have diverticulitis  Diverticulitis is a condition that causes diverticula (small pockets) along your intestine to become inflamed or infected  This is caused by hard bowel movement, food, or bacteria that get stuck in the pockets  Which foods may be recommended while I have diverticulitis? A clear liquid diet may be recommended for 2 to 3 days  A clear liquid diet includes clear liquids, and foods that are liquid at room temperature  Examples include the following:     Water and clear juices (such as apple, cranberry, or grape), strained citrus juices or fruit punch    Coffee or tea (without cream or milk)    Clear sports drinks or soft drinks, such as ginger ale, lemon-lime soda, or club soda (no cola or root beer)    Clear broth, bouillon, or consommé    Plain popsicles (no popsicles with pureed fruit or fiber)    Flavored gelatin without fruit    Low-fiber foods may be recommended until your symptoms improve  Examples include the following:     Cream of wheat and finely ground grits    White bread, white pasta, and white rice    Canned and well-cooked fruit without skins or seeds, and juice without pulp    Canned and well-cooked vegetables without skins or seeds, and vegetable juice    Cow's milk, lactose-free milk, soy milk, and rice milk    Yogurt, cottage cheese, and sherbet    Eggs, poultry (such as chicken and turkey), fish, and tender, ground, well-cooked beef     Tofu and smooth nut butters, such as peanut butter    Broth and strained soups made of low-fiber foods    What do I need to know about high-fiber foods? High-fiber foods can help prevent diverticulosis and diverticulitis  Your healthcare provider will tell you when you can add high-fiber foods back into your diet   Examples include the following:  Whole grains and breads, and cereals made with whole grains    Dried fruit, fresh fruit with skin, and fruit pulp    Raw vegetables    Cooked greens, such as spinach    Tough meat and meat with gristle    Legumes, such as mckinnon beans and lentils       When should I call my doctor? Your symptoms get worse or do not go away  You have questions about the foods you should eat  You have questions or concerns about your condition or care  CARE AGREEMENT:   You have the right to help plan your care  Learn about your health condition and how it may be treated  Discuss treatment options with your healthcare providers to decide what care you want to receive  You always have the right to refuse treatment  The above information is an  only  It is not intended as medical advice for individual conditions or treatments  Talk to your doctor, nurse or pharmacist before following any medical regimen to see if it is safe and effective for you  © Copyright [employer] 2022 Information is for End User's use only and may not be sold, redistributed or otherwise used for commercial purposes

## 2023-03-16 ENCOUNTER — ANESTHESIA EVENT (OUTPATIENT)
Dept: GASTROENTEROLOGY | Facility: AMBULATORY SURGERY CENTER | Age: 58
End: 2023-03-16

## 2023-03-16 ENCOUNTER — HOSPITAL ENCOUNTER (OUTPATIENT)
Dept: GASTROENTEROLOGY | Facility: AMBULATORY SURGERY CENTER | Age: 58
Discharge: HOME | End: 2023-03-16

## 2023-03-16 ENCOUNTER — ANESTHESIA (OUTPATIENT)
Dept: GASTROENTEROLOGY | Facility: AMBULATORY SURGERY CENTER | Age: 58
End: 2023-03-16

## 2023-03-16 VITALS
HEIGHT: 71 IN | DIASTOLIC BLOOD PRESSURE: 76 MMHG | SYSTOLIC BLOOD PRESSURE: 145 MMHG | OXYGEN SATURATION: 99 % | BODY MASS INDEX: 36.4 KG/M2 | HEART RATE: 73 BPM | WEIGHT: 260 LBS | TEMPERATURE: 96.7 F | RESPIRATION RATE: 18 BRPM

## 2023-03-16 DIAGNOSIS — K62.5 RECTAL BLEEDING: ICD-10-CM

## 2023-03-16 DIAGNOSIS — K21.9 GASTROESOPHAGEAL REFLUX DISEASE, UNSPECIFIED WHETHER ESOPHAGITIS PRESENT: ICD-10-CM

## 2023-03-16 DIAGNOSIS — K57.90 DIVERTICULAR DISEASE: ICD-10-CM

## 2023-03-16 DIAGNOSIS — R10.9 LEFT SIDED ABDOMINAL PAIN: ICD-10-CM

## 2023-03-16 RX ORDER — PROPOFOL 10 MG/ML
INJECTION, EMULSION INTRAVENOUS CONTINUOUS PRN
Status: DISCONTINUED | OUTPATIENT
Start: 2023-03-16 | End: 2023-03-16

## 2023-03-16 RX ORDER — SODIUM CHLORIDE, SODIUM LACTATE, POTASSIUM CHLORIDE, CALCIUM CHLORIDE 600; 310; 30; 20 MG/100ML; MG/100ML; MG/100ML; MG/100ML
INJECTION, SOLUTION INTRAVENOUS CONTINUOUS PRN
Status: DISCONTINUED | OUTPATIENT
Start: 2023-03-16 | End: 2023-03-16

## 2023-03-16 RX ORDER — PROPOFOL 10 MG/ML
INJECTION, EMULSION INTRAVENOUS AS NEEDED
Status: DISCONTINUED | OUTPATIENT
Start: 2023-03-16 | End: 2023-03-16

## 2023-03-16 RX ADMIN — SODIUM CHLORIDE, SODIUM LACTATE, POTASSIUM CHLORIDE, CALCIUM CHLORIDE: 600; 310; 30; 20 INJECTION, SOLUTION INTRAVENOUS at 12:52

## 2023-03-16 RX ADMIN — PROPOFOL 150 MG: 10 INJECTION, EMULSION INTRAVENOUS at 12:53

## 2023-03-16 RX ADMIN — PROPOFOL 100 MCG/KG/MIN: 10 INJECTION, EMULSION INTRAVENOUS at 12:53

## 2023-03-16 NOTE — INTERVAL H&P NOTE
H&P reviewed  After examining the patient I find no changes in the patients condition since the H&P had been written      Vitals:    03/16/23 1225   BP: 152/80   Pulse: 71   Resp: 20   Temp: (!) 96 7 °F (35 9 °C)   SpO2: 96%

## 2023-03-16 NOTE — ANESTHESIA PREPROCEDURE EVALUATION
Procedure:  EGD  COLONOSCOPY    Relevant Problems   CARDIO   (+) Essential hypertension   (+) Hyperlipidemia      ENDO   (+) Type 2 diabetes mellitus without complication, without long-term current use of insulin (HCC)      GI/HEPATIC   (+) Gastroesophageal reflux disease      /RENAL   (+) MICHAEL (acute kidney injury) ([facility] [address] )      HEMATOLOGY   (+) Anemia        Physical Exam    Airway    Mallampati score: II  TM Distance: >3 FB  Neck ROM: full     Dental   No notable dental hx     Cardiovascular  Cardiovascular exam normal    Pulmonary  Pulmonary exam normal     Other Findings        Anesthesia Plan  ASA Score- 2     Anesthesia Type- IV sedation with anesthesia with ASA Monitors  Additional Monitors:   Airway Plan:           Plan Factors-Exercise tolerance (METS): >4 METS  Chart reviewed  Patient summary reviewed  Patient is not a current smoker  Induction- intravenous  Postoperative Plan-     Informed Consent- Anesthetic plan and risks discussed with patient

## 2023-07-21 ENCOUNTER — TELEPHONE (OUTPATIENT)
Age: 58
End: 2023-07-21

## 2023-07-21 ENCOUNTER — PREP FOR PROCEDURE (OUTPATIENT)
Age: 58
End: 2023-07-21

## 2023-07-21 DIAGNOSIS — K31.7 GASTRIC POLYPS: Primary | ICD-10-CM

## 2023-07-21 NOTE — TELEPHONE ENCOUNTER
OA Questions for EGD      PASSED OA  DIABETIC    Date: 7/21/2023  Screened by: [facility]     Referring Provider: ESTEFANY     Pre-Screening: BMI 36.26  Past EGD? If yes - Date: 3/16/2023  Physician/Facility: [facility] Reason: AMBER PRITCHETT     SCHEDULING STAFF: If the patient is over 76years old, please schedule an office visit. ·      Does the patient want to see a gastroenterologist prior to their procedure to discuss any GI symptoms? no  ·      Has the patient been hospitalized or had abdominal surgery in the past 6 months? no  ·      Does the patient use supplemental oxygen? no  ·      Does the patient take [Coumadin], [Lovenox], [Plavix], [Eliquis], [Xarelto], or other blood thinning medication? NO  ·      Has the patient had a stroke, cardiac event, or stent placed in the past year? NO     SCHEDULING STAFF: If patient answers NO to the above questions, then schedule the procedure. If patient answers YES to any of the above questions, then schedule an office appointment. ·       If a repeat EGD is belated and patient declines procedure à notify provider.

## 2023-07-21 NOTE — TELEPHONE ENCOUNTER
[address] [facility],[address] Assessment    Name: Livan Ratliff  YOB: 1965  Last Height: 5' 11" (1.803 m)  Last weight: 118 kg (260 lb)  BMI: 36.26 kg/m²  Procedure: Egd  Diagnosis: HX GASTRIC POLYPS  Date of procedure: 9/20/2023  Prep: EGD  Responsible :DEEPAK WHITTINGTON  Phone#: [phone]  Name completing form: Hilario Peterson MA  Date form completed: 07/21/23      If the patient answers yes to any of these questions, schedule in a hospital  Are you pregnant: No  Do you rely on a wheelchair for mobility: No  Have you been diagnosed with End Stage Renal Disease (ESRD): No  Do you need oxygen during the day: No  Have you had a heart attack or stroke within the past three months: No  Have you had a seizure within the past three months: No  Have you ever been informed by anesthesia that you have a difficult airway: No  Additional Questions  Have you had any cardiac testing or are under the care of a Cardiologist (see cardiac list): No  Cardiac list:   Do you have an implanted cardiac defibrillator: No (Comment:  This patient should be scheduled in the hospital)    Have any bleeding problems, such as anemia or hemophilia (If patient has H&H result below 8, schedule in hospital.  H&H must be within 30 days of procedure): No    Had an organ transplant within the past 3 months: No    Do you have any present infections: No  Do you get short of breath when walking a few blocks: No  Have you been diagnosed with diabetes: Yes  Comments (provide cardiac provider information if applicable):

## 2023-09-25 ENCOUNTER — HOSPITAL ENCOUNTER (OUTPATIENT)
Dept: GASTROENTEROLOGY | Facility: AMBULATORY SURGERY CENTER | Age: 58
Discharge: HOME | End: 2023-09-25
Payer: COMMERCIAL

## 2023-09-25 ENCOUNTER — ANESTHESIA (OUTPATIENT)
Dept: GASTROENTEROLOGY | Facility: AMBULATORY SURGERY CENTER | Age: 58
End: 2023-09-25

## 2023-09-25 ENCOUNTER — ANESTHESIA EVENT (OUTPATIENT)
Dept: GASTROENTEROLOGY | Facility: AMBULATORY SURGERY CENTER | Age: 58
End: 2023-09-25

## 2023-09-25 ENCOUNTER — HOSPITAL ENCOUNTER (OUTPATIENT)
Dept: GASTROENTEROLOGY | Facility: AMBULATORY SURGERY CENTER | Age: 58
Discharge: HOME | End: 2023-09-20

## 2023-09-25 VITALS
DIASTOLIC BLOOD PRESSURE: 88 MMHG | OXYGEN SATURATION: 98 % | BODY MASS INDEX: 36.4 KG/M2 | SYSTOLIC BLOOD PRESSURE: 152 MMHG | HEART RATE: 66 BPM | RESPIRATION RATE: 18 BRPM | TEMPERATURE: 98.1 F | HEIGHT: 71 IN | WEIGHT: 260 LBS

## 2023-09-25 DIAGNOSIS — K31.7 GASTRIC POLYPS: ICD-10-CM

## 2023-09-25 PROCEDURE — 43251 EGD REMOVE LESION SNARE: CPT | Performed by: INTERNAL MEDICINE

## 2023-09-25 PROCEDURE — 43239 EGD BIOPSY SINGLE/MULTIPLE: CPT | Performed by: INTERNAL MEDICINE

## 2023-09-25 RX ORDER — PROPOFOL 10 MG/ML
INJECTION, EMULSION INTRAVENOUS AS NEEDED
Status: DISCONTINUED | OUTPATIENT
Start: 2023-09-25 | End: 2023-09-25

## 2023-09-25 RX ORDER — SODIUM CHLORIDE, SODIUM LACTATE, POTASSIUM CHLORIDE, CALCIUM CHLORIDE 600; 310; 30; 20 MG/100ML; MG/100ML; MG/100ML; MG/100ML
INJECTION, SOLUTION INTRAVENOUS CONTINUOUS PRN
Status: DISCONTINUED | OUTPATIENT
Start: 2023-09-25 | End: 2023-09-25

## 2023-09-25 RX ORDER — LIDOCAINE HYDROCHLORIDE 20 MG/ML
INJECTION, SOLUTION EPIDURAL; INFILTRATION; INTRACAUDAL; PERINEURAL AS NEEDED
Status: DISCONTINUED | OUTPATIENT
Start: 2023-09-25 | End: 2023-09-25

## 2023-09-25 RX ORDER — HYDROCHLOROTHIAZIDE 25 MG/1
TABLET ORAL
COMMUNITY
Start: 2023-07-05

## 2023-09-25 RX ADMIN — PROPOFOL 50 MG: 10 INJECTION, EMULSION INTRAVENOUS at 08:16

## 2023-09-25 RX ADMIN — LIDOCAINE HYDROCHLORIDE 100 MG: 20 INJECTION, SOLUTION EPIDURAL; INFILTRATION; INTRACAUDAL; PERINEURAL at 08:13

## 2023-09-25 RX ADMIN — PROPOFOL 50 MG: 10 INJECTION, EMULSION INTRAVENOUS at 08:21

## 2023-09-25 RX ADMIN — SODIUM CHLORIDE, SODIUM LACTATE, POTASSIUM CHLORIDE, CALCIUM CHLORIDE: 600; 310; 30; 20 INJECTION, SOLUTION INTRAVENOUS at 08:09

## 2023-09-25 RX ADMIN — PROPOFOL 50 MG: 10 INJECTION, EMULSION INTRAVENOUS at 08:18

## 2023-09-25 RX ADMIN — PROPOFOL 20 MG: 10 INJECTION, EMULSION INTRAVENOUS at 08:14

## 2023-09-25 RX ADMIN — PROPOFOL 130 MG: 10 INJECTION, EMULSION INTRAVENOUS at 08:13

## 2023-09-25 NOTE — INTERVAL H&P NOTE
H&P reviewed. After examining the patient I find no changes in the patients condition since the H&P had been written.     Vitals:    09/25/23 0744   BP: 150/91   Pulse: 68   Resp: 18   Temp: 98.1 °F (36.7 °C)   SpO2: 95%

## 2023-09-25 NOTE — ANESTHESIA POSTPROCEDURE EVALUATION
Post-Op Assessment Note    CV Status:  Stable    Pain management: adequate     Mental Status:  Alert and awake   Hydration Status:  Euvolemic   PONV Controlled:  Controlled   Airway Patency:  Patent      Post Op Vitals Reviewed: Yes      Staff: CRNA         No notable events documented.     BP   128/78   Temp      Pulse  75   Resp   18   SpO2   95 2l nc

## 2023-09-25 NOTE — H&P
History and Physical - [facility]  Derrell Roth 63 y.o. male MRN: [id number]                  HPI: Derrell Roth is a 63y.o. year old male who presents for EGD for history of gastric polyp. Patient has epigastric and left-sided abdominal pain. REVIEW OF SYSTEMS: Per the HPI, and otherwise unremarkable.     Historical Information   Past Medical History:   Diagnosis Date   • Acquired pes planus    • Arthralgia of ankle    • Calcaneal spur    • Chronic hip pain    • Colon polyp    • COVID-19 01/19/2022   • Diabetes mellitus ([address])     recently put on RX for elevated HGB A1C   • Diverticulitis    • Essential hypertension 02/28/2016   • Flat foot    • Flat foot 02/28/2016   • Gastric polyp    • GERD (gastroesophageal reflux disease)    • Hallux valgus    • High cholesterol    • Hyperlipidemia 02/28/2016   • Hypertension    • Plantar fibromatosis    • Posterior tibial tendinitis      Past Surgical History:   Procedure Laterality Date   • COLONOSCOPY     • CT EPIDURAL STEROID INJECTION (RANDI LUMBAR)  11/27/2019   • CT EPIDURAL STEROID INJECTION (RANDI LUMBAR)  6/5/2020   • EVACUATION OF HEMATOMA Left     knee   • [place] INJECTION RIGHT HIP (NON ARTHROGRAM)  10/8/2019   • HAND SURGERY      reconstruction of tendons in hand s/p inury/laceration   • OSTEOCHONDROMA EXCISION      left knee   • [place] ARTHRP ACETBLR/PROX FEM PROSTC AGRFT/ALGRFT Right 2/29/2016    Procedure: ARTHROPLASTY HIP TOTAL ANTERIOR;  Surgeon: Nataliia Pham MD;  Location:  MAIN OR;  Service: Orthopedics   • [place] REVJ TOT HIP ARTHRP [address] W/WO AGRFT/ALGRFT Right 1/4/2021    Procedure: ARTHROPLASTY HIP TOTAL REVISION - RIGHT;  Surgeon: Shahbaz Reza DO;  Location: [facility];  Service: Orthopedics     Social History   Social History     Substance and Sexual Activity   Alcohol Use Yes   • Alcohol/week: 28.0 standard drinks of alcohol   • Types: 28 Cans of beer per week    Comment: beer or   vodka     Social History     Substance and Sexual Activity   Drug Use No     Social History     Tobacco Use   Smoking Status Never   Smokeless Tobacco Never     Family History   Problem Relation Age of Onset   • Diabetes Mother    • Atrial fibrillation Mother    • Lymphoma Father    • COPD Father    • Heart disease Father        Meds/Allergies       Current Outpatient Medications:   •  b complex vitamins capsule  •  co-enzyme Q-10 30 MG capsule  •  hydrochlorothiazide (HYDRODIURIL) 25 mg tablet  •  losartan (COZAAR) 100 MG tablet  •  pantoprazole (PROTONIX) 40 mg tablet  •  semaglutide, 0.25 or 0.5 mg/dose, (Ozempic, 0.25 or 0.5 MG/DOSE,) 2 mg/1.5 mL injection pen  •  simvastatin (ZOCOR) 40 mg tablet  •  metFORMIN (GLUCOPHAGE-XR) 500 mg 24 hr tablet    No Known Allergies    Objective     /91   Pulse 68   Temp 98.1 °F (36.7 °C) (Skin)   Resp 18   Ht 5' 11" (1.803 m)   Wt 118 kg (260 lb)   SpO2 95%   BMI 36.26 kg/m²       PHYSICAL EXAM    Gen: NAD  Head: NCAT  CV: RRR  CHEST: Clear  ABD: soft, NT/ND  EXT: no edema      ASSESSMENT/PLAN:  This is a 63y.o. year old male here for EGD, and he is stable and optimized for his procedure.

## 2023-09-25 NOTE — DISCHARGE SUMMARY
Discharge Summary - Rosendo Castaneda 63 y.o. male MRN: [id number]    Unit/Bed#:  Encounter: [id number]    Admission Date:  9/25/2023    Admitting Diagnosis: Gastric polyps [K31.7]    HPI: Abdominal pain and history of polyp. History of diverticular disease    Procedures Performed: No orders of the defined types were placed in this encounter. Summary of Hospital Course: Tolerated procedure well    Significant Findings, Care, Treatment and Services Provided: Multiple gastric polyps noted. About 12 of them removed using a snare. Complications: None    Discharge Diagnosis: See above    Medical Problems     Resolved Problems  Date Reviewed: 3/16/2023   None         Condition at Discharge: good         Discharge instructions/Information to patient and family:   See after visit summary for information provided to patient and family. Provisions for Follow-Up Care:  See after visit summary for information related to follow-up care and any pertinent home health orders.       PCP: TANIA Lujan    Disposition: Home

## 2023-09-25 NOTE — ANESTHESIA PREPROCEDURE EVALUATION
Procedure:  EGD    Relevant Problems   CARDIO   (+) Essential hypertension   (+) Hyperlipidemia      ENDO   (+) Type 2 diabetes mellitus without complication, without long-term current use of insulin (HCC)      GI/HEPATIC   (+) Gastroesophageal reflux disease      /RENAL   (+) MICHAEL (acute kidney injury) (HCC)      HEMATOLOGY   (+) Anemia      Musculoskeletal and Integument   (+) Posterior tibial tendinitis of left lower extremity      Other   (+) Congenital pes planus   (+) Deformity of foot   (+) Difficulty walking   (+) Flat foot   (+) S/P revision of total hip      Lab Results   Component Value Date    SODIUM 138 03/03/2023    K 4.3 03/03/2023     03/03/2023    CO2 30 03/03/2023    AGAP 7 03/03/2023    BUN 22 03/03/2023    CREATININE 1.13 03/03/2023    GLUC 158 (H) 03/03/2023    GLUF 126 (H) 12/08/2020    CALCIUM 9.7 03/03/2023    AST 29 03/03/2023    ALT 48 03/03/2023    ALKPHOS 63 03/03/2023    TP 6.7 03/03/2023    TBILI 0.45 03/03/2023    EGFR 71 03/03/2023     Lab Results   Component Value Date    WBC 8.27 03/03/2023    HGB 15.3 03/03/2023    HCT 44.3 03/03/2023    MCV 90 03/03/2023     03/03/2023       Physical Exam    Airway    Mallampati score: II  TM Distance: <3 FB  Neck ROM: full     Dental       Cardiovascular      Pulmonary      Other Findings        Anesthesia Plan  ASA Score- 2     Anesthesia Type- IV sedation with anesthesia with ASA Monitors. Additional Monitors:   Airway Plan:           Plan Factors-Exercise tolerance (METS): >4 METS. Chart reviewed. EKG reviewed. Imaging results reviewed. Existing labs reviewed. Patient summary reviewed. Induction- intravenous. Postoperative Plan-     Informed Consent- Anesthetic plan and risks discussed with patient. I personally reviewed this patient with the CRNA. Discussed and agreed on the Anesthesia Plan with the CRNA. Florencio Bradford

## 2023-11-16 ENCOUNTER — APPOINTMENT (OUTPATIENT)
Dept: RADIOLOGY | Facility: OTHER | Age: 58
End: 2023-11-16
Payer: COMMERCIAL

## 2023-11-16 ENCOUNTER — OFFICE VISIT (OUTPATIENT)
Dept: OBGYN CLINIC | Facility: OTHER | Age: 58
End: 2023-11-16
Payer: COMMERCIAL

## 2023-11-16 VITALS
HEART RATE: 76 BPM | BODY MASS INDEX: 36.82 KG/M2 | DIASTOLIC BLOOD PRESSURE: 89 MMHG | SYSTOLIC BLOOD PRESSURE: 134 MMHG | HEIGHT: 71 IN | WEIGHT: 263 LBS

## 2023-11-16 DIAGNOSIS — M75.52 SUBACROMIAL BURSITIS OF LEFT SHOULDER JOINT: ICD-10-CM

## 2023-11-16 DIAGNOSIS — M25.512 ACUTE PAIN OF LEFT SHOULDER: ICD-10-CM

## 2023-11-16 DIAGNOSIS — M75.82 ROTATOR CUFF TENDONITIS, LEFT: Primary | ICD-10-CM

## 2023-11-16 PROCEDURE — 20610 DRAIN/INJ JOINT/BURSA W/O US: CPT | Performed by: ORTHOPAEDIC SURGERY

## 2023-11-16 PROCEDURE — 73030 X-RAY EXAM OF SHOULDER: CPT

## 2023-11-16 PROCEDURE — 99204 OFFICE O/P NEW MOD 45 MIN: CPT | Performed by: ORTHOPAEDIC SURGERY

## 2023-11-16 RX ORDER — BUPIVACAINE HYDROCHLORIDE 2.5 MG/ML
2 INJECTION, SOLUTION INFILTRATION; PERINEURAL
Status: COMPLETED | OUTPATIENT
Start: 2023-11-16 | End: 2023-11-16

## 2023-11-16 RX ORDER — BETAMETHASONE SODIUM PHOSPHATE AND BETAMETHASONE ACETATE 3; 3 MG/ML; MG/ML
6 INJECTION, SUSPENSION INTRA-ARTICULAR; INTRALESIONAL; INTRAMUSCULAR; SOFT TISSUE
Status: COMPLETED | OUTPATIENT
Start: 2023-11-16 | End: 2023-11-16

## 2023-11-16 RX ADMIN — BETAMETHASONE SODIUM PHOSPHATE AND BETAMETHASONE ACETATE 6 MG: 3; 3 INJECTION, SUSPENSION INTRA-ARTICULAR; INTRALESIONAL; INTRAMUSCULAR; SOFT TISSUE at 13:30

## 2023-11-16 RX ADMIN — BUPIVACAINE HYDROCHLORIDE 2 ML: 2.5 INJECTION, SOLUTION INFILTRATION; PERINEURAL at 13:30

## 2023-11-16 NOTE — PROGRESS NOTES
Assessment  Diagnoses and all orders for this visit:    Rotator cuff tendonitis, left    Subacromial bursitis of left shoulder joint      Discussion and Plan:    The patient has an examination consistent with subacromial impingement syndrome of the left shoulder. I have discussed with the patient the pathophysiology of this diagnosis and reviewed how the examination correlates with this diagnosis. Treatment options were discussed at length and after discussing these treatment options, the patient elected for and received a subacromial injection of corticosteroid (as described in the procedure note) with a prescription for referral to physical therapy. We will reevaluate the patient in 4 weeks. If the symptoms fail to improve with this treatment the patient would be indicated for further imaging in the form of an MRI scan of the shoulder. Given the patient's occupation as a  if he does need any intervention surgically that would have to be done in January so he can recover fully for next season so we will see him back sooner than typical if necessary so we can accommodate his timing although I think it is unlikely he would require surgical treatment    Subjective:   Patient ID: Urszula Cheung is a 63 y.o. male      HPI  The patient presents with a chief complaint of left shoulder pain. The pain began several year(s) ago worse over the past few weeks and is not associated with an acute injury. The patient describes the pain as aching, dull, and sharp in intensity,  intermittent in timing, and localizes the pain to the  left lateral shoulder. The pain is worse with movement, overhead work, and overuse and relieved by rest.  The pain is not associated with numbness and tingling. The pain is not associated with constitutional symptoms. The patient is awoken at night by the pain. Patient is a  and this is his busy season.      The following portions of the patient's history were reviewed and updated as appropriate: allergies, current medications, past family history, past medical history, past social history, past surgical history and problem list.        Objective:  /89 (BP Location: Right arm, Patient Position: Sitting, Cuff Size: Large)   Pulse 76   Ht 5' 11" (1.803 m)   Wt 119 kg (263 lb)   BMI 36.68 kg/m²       Left Shoulder Exam     Tenderness   The patient is experiencing no tenderness. Range of Motion   The patient has normal left shoulder ROM. Muscle Strength   Abduction: 5/5   Internal rotation: 5/5   External rotation: 5/5     Tests   Lerma test: positive  Impingement: positive    Other   Erythema: absent  Sensation: normal  Pulse: present             Physical Exam  Vitals reviewed. Constitutional:       Appearance: He is well-developed. HENT:      Head: Normocephalic. Eyes:      Pupils: Pupils are equal, round, and reactive to light. Pulmonary:      Effort: Pulmonary effort is normal.   Abdominal:      General: Abdomen is flat. There is no distension. Skin:     General: Skin is warm and dry. Large joint arthrocentesis: L subacromial bursa  Universal Protocol:  Consent: Verbal consent obtained. Consent given by: patient  Patient understanding: patient states understanding of the procedure being performed  Site marked: the operative site was marked  Supporting Documentation  Indications: pain   Procedure Details  Location: shoulder - L subacromial bursa  Needle size: 22 G  Ultrasound guidance: no  Approach: lateral  Medications administered: 2 mL bupivacaine 0.25 %; 6 mg betamethasone acetate-betamethasone sodium phosphate 6 (3-3) mg/mL    Patient tolerance: patient tolerated the procedure well with no immediate complications  Dressing:  Sterile dressing applied         I have personally reviewed pertinent films in PACS and my interpretation is as follows.   Left shoulder x-rays demonstrates no fracture or dislocation, calcific tendonitis.      Scribe Attestation      I,:  Ham Reyes am acting as a scribe while in the presence of the attending physician.:       I,:  Sari Velazquez MD personally performed the services described in this documentation    as scribed in my presence.:

## 2023-11-16 NOTE — PATIENT INSTRUCTIONS
CORTICOSTEROID INJECTION  What is a corticosteroid? Injuries or disease such as arthritis, bursitis or tendonitis result in inflammation. In turn, this inflammation can cause swelling and pain. A local injection of a corticosteroid is provided to diminish inflammation. By doing so, it will also decrease pain and swelling which is making you uncomfortable. Is this the same thing as a Cortisone Injection? Cortisone® is a brand name of a corticosteroid used commonly in the past.  Today I commonly use a more water-soluble corticosteroid named Celestone®. Will the injection hurt? As with any injection, you may feel pain at the time of the injection. Typically, I use a local anesthetic (Trude Gun) in addition to the corticosteroid to determine if the injection has been placed in the appropriate location. Hence it is important to monitor your symptoms 4-6 hours after the injection, as the area will be anesthetized (numb) while the local anesthetic is working. Once the local anesthetic wears off, the intensity of pain can be the same as it was prior to the injection, or even worse. This does not mean that the injection is not working. The corticosteroid may take 24-72 hours to begin having a positive effect. If you do experience an increase in pain, the use of an ice pack on the area for 20 minutes at a time should help. It is also helpful to take an oral anti-inflammatory such as Tylenol® or Motrin® if you are able to medically do so. For this reason it is best to avoid activities that put stress on the area the first 24 hours after the injection. How long will pain relief last?  This will vary according to the type and severity of the symptoms being treated and the severity of the condition. Symptom relief may last weeks to months. I typically couple injections with physical therapy so that the underlying problem causing the inflammation may be treated as the pain diminishes.   If the combination is not successful, you may be a surgical candidate. I have read bad things about steroids. Will these things happen to me? Corticosteroids, when utilized properly, are safe and effective drugs. When used in a low dose, potential adverse reactions are very rare. Some patients may experience a sensation of flushing for several days. Very rarely, there can be a local reaction which may include increased discomfort for a period of time in the areas that has been injected. A steroid should not be used over and over again. Multiple injections in the same area can produce adverse effects such as tissue atrophy and degeneration of tendon or cartilage. A small percentage of patients (< 0.1%) may develop an infection in the joint after injection. This is a treatable problem, but if neglected, may result in permanent disability. Signs of infection include redness, swelling, discharge, fevers, increasing pain and drainage from the injection site. This represents an emergency and you should contact our office immediately or seek treatment in the ER if after hours. If I have diabetes, will this injection affect me? If you are diabetic, an injection of a corticosteroid can raise your blood sugar level, requiring more insulin for a brief period of time. This may necessitate careful blood sugar maintenance. If the elevated sugars are not able to be controlled, contact your diabetic doctor for guidance.

## 2023-11-16 NOTE — LETTER
November 16, 2023     Radha Morgan, [address] [facility] [address] [id number] [address]    Patient: Apollo Reinoso   YOB: 1965   Date of Visit: 11/16/2023       Dear Dr. Duffy Ill: Thank you for referring Delvin Maldonado to me for evaluation. Below are my notes for this consultation. If you have questions, please do not hesitate to call me. I look forward to following your patient along with you. Sincerely,        Azul Quinn MD        CC: No Recipients    Azul Quinn MD  11/16/2023  2:25 PM  Sign when Signing Visit    Assessment  Diagnoses and all orders for this visit:    Rotator cuff tendonitis, left    Subacromial bursitis of left shoulder joint      Discussion and Plan:    The patient has an examination consistent with subacromial impingement syndrome of the left shoulder. I have discussed with the patient the pathophysiology of this diagnosis and reviewed how the examination correlates with this diagnosis. Treatment options were discussed at length and after discussing these treatment options, the patient elected for and received a subacromial injection of corticosteroid (as described in the procedure note) with a prescription for referral to physical therapy. We will reevaluate the patient in 4 weeks. If the symptoms fail to improve with this treatment the patient would be indicated for further imaging in the form of an MRI scan of the shoulder. Given the patient's occupation as a  if he does need any intervention surgically that would have to be done in January so he can recover fully for next season so we will see him back sooner than typical if necessary so we can accommodate his timing although I think it is unlikely he would require surgical treatment    Subjective:   Patient ID: Apollo Reinoso is a 63 y.o. male      HPI  The patient presents with a chief complaint of left shoulder pain.    The pain began several year(s) ago worse over the past few weeks and is not associated with an acute injury. The patient describes the pain as aching, dull, and sharp in intensity,  intermittent in timing, and localizes the pain to the  left lateral shoulder. The pain is worse with movement, overhead work, and overuse and relieved by rest.  The pain is not associated with numbness and tingling. The pain is not associated with constitutional symptoms. The patient is awoken at night by the pain. Patient is a  and this is his busy season. The following portions of the patient's history were reviewed and updated as appropriate: allergies, current medications, past family history, past medical history, past social history, past surgical history and problem list.        Objective:  /89 (BP Location: Right arm, Patient Position: Sitting, Cuff Size: Large)   Pulse 76   Ht 5' 11" (1.803 m)   Wt 119 kg (263 lb)   BMI 36.68 kg/m²       Left Shoulder Exam     Tenderness   The patient is experiencing no tenderness. Range of Motion   The patient has normal left shoulder ROM. Muscle Strength   Abduction: 5/5   Internal rotation: 5/5   External rotation: 5/5     Tests   Lerma test: positive  Impingement: positive    Other   Erythema: absent  Sensation: normal  Pulse: present             Physical Exam  Vitals reviewed. Constitutional:       Appearance: He is well-developed. HENT:      Head: Normocephalic. Eyes:      Pupils: Pupils are equal, round, and reactive to light. Pulmonary:      Effort: Pulmonary effort is normal.   Abdominal:      General: Abdomen is flat. There is no distension. Skin:     General: Skin is warm and dry. Large joint arthrocentesis: L subacromial bursa  Universal Protocol:  Consent: Verbal consent obtained.   Consent given by: patient  Patient understanding: patient states understanding of the procedure being performed  Site marked: the operative site was marked  Supporting Documentation  Indications: pain   Procedure Details  Location: shoulder - L subacromial bursa  Needle size: 22 G  Ultrasound guidance: no  Approach: lateral  Medications administered: 2 mL bupivacaine 0.25 %; 6 mg betamethasone acetate-betamethasone sodium phosphate 6 (3-3) mg/mL    Patient tolerance: patient tolerated the procedure well with no immediate complications  Dressing:  Sterile dressing applied         I have personally reviewed pertinent films in PACS and my interpretation is as follows. Left shoulder x-rays demonstrates no fracture or dislocation, calcific tendonitis.      Scribe Attestation      I,:  Sasha Huddleston am acting as a scribe while in the presence of the attending physician.:       I,:  Irvin Spencer MD personally performed the services described in this documentation    as scribed in my presence.:

## 2023-12-04 ENCOUNTER — OFFICE VISIT (OUTPATIENT)
Dept: OBGYN CLINIC | Facility: CLINIC | Age: 58
End: 2023-12-04
Payer: COMMERCIAL

## 2023-12-04 ENCOUNTER — APPOINTMENT (OUTPATIENT)
Dept: RADIOLOGY | Facility: CLINIC | Age: 58
End: 2023-12-04
Payer: COMMERCIAL

## 2023-12-04 VITALS
HEIGHT: 71 IN | WEIGHT: 263 LBS | HEART RATE: 80 BPM | DIASTOLIC BLOOD PRESSURE: 78 MMHG | BODY MASS INDEX: 36.82 KG/M2 | SYSTOLIC BLOOD PRESSURE: 124 MMHG

## 2023-12-04 DIAGNOSIS — M25.562 LEFT KNEE PAIN, UNSPECIFIED CHRONICITY: ICD-10-CM

## 2023-12-04 DIAGNOSIS — M17.12 PRIMARY OSTEOARTHRITIS OF LEFT KNEE: Primary | ICD-10-CM

## 2023-12-04 DIAGNOSIS — Z01.89 ENCOUNTER FOR LOWER EXTREMITY COMPARISON IMAGING STUDY: ICD-10-CM

## 2023-12-04 PROCEDURE — 73564 X-RAY EXAM KNEE 4 OR MORE: CPT

## 2023-12-04 PROCEDURE — 73562 X-RAY EXAM OF KNEE 3: CPT

## 2023-12-04 PROCEDURE — 99213 OFFICE O/P EST LOW 20 MIN: CPT | Performed by: ORTHOPAEDIC SURGERY

## 2023-12-04 PROCEDURE — 20610 DRAIN/INJ JOINT/BURSA W/O US: CPT | Performed by: ORTHOPAEDIC SURGERY

## 2023-12-04 RX ADMIN — TRIAMCINOLONE ACETONIDE 40 MG: 40 INJECTION, SUSPENSION INTRA-ARTICULAR; INTRAMUSCULAR at 15:30

## 2023-12-04 RX ADMIN — BUPIVACAINE HYDROCHLORIDE 4 ML: 5 INJECTION, SOLUTION EPIDURAL; INTRACAUDAL at 15:30

## 2023-12-04 NOTE — PROGRESS NOTES
Ortho Sports Medicine New Patient Visit     Assesment:   63 y.o. male with mild to moderate medial compartment degenerative joint disease    Plan:    Spencer Mcqueen is a pleasant 63year-old male who presents for initial evaluation of the left knee. 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 I see no clear indication for surgical intervention at this point. He does have mild-moderate medial compartment degenerative changes. We discussed conservative treatment options including physical therapy, sets, and corticosteroid injections. He chose to defer physical therapy until his busy season at work is over. He elected to proceed with a corticosteroid injection today for faster relief and to allow him to continue working. Giuliana Art He tolerated the procedure well, as seen below. Did also discuss the options for bracing. He does have medial compartment gapping instability, though with a firm endpoint. I believe this level of instability is related to his osteoarthritis medial compartment. I believe this instability will benefit from medial  brace. We did order this today and he is can set up an appointment to be fitted for the brace. He is going to follow-up with me in 6 weeks for repeat evaluation. Large joint arthrocentesis: L knee  Universal Protocol:  Consent: Verbal consent obtained. Risks and benefits: risks, benefits and alternatives were discussed  Consent given by: patient  Time out: Immediately prior to procedure a "time out" was called to verify the correct patient, procedure, equipment, support staff and site/side marked as required.   Patient understanding: patient states understanding of the procedure being performed  Site marked: the operative site was marked  Patient identity confirmed: verbally with patient  Supporting Documentation  Indications: pain and joint swelling   Procedure Details  Location: knee - L knee  Preparation: Patient was prepped and draped in the usual sterile fashion  Needle size: 22 G  Medications administered: 40 mg triamcinolone acetonide 40 mg/mL; 4 mL bupivacaine (PF) 0.5 %    Patient tolerance: patient tolerated the procedure well with no immediate complications  Dressing:  Sterile dressing applied      Follow up:    Return in about 6 weeks (around 1/15/2024) for Recheck. Chief Complaint   Patient presents with    Left Knee - Pain       History of Present Illness: The patient is a 63 y.o. male who presents for initial evaluation of the left knee. He has had knee pain for the past few months. Two weeks ago the knee began clicking. He previously had a revision of a right hip arthroplasty and feels he was compensating a great deal with the left side. Over the past week, he feels the knee has declined rapidly. He now has difficulty walking, sitting, kneeling, and standing. He reports the knee is swollen compared to the right side. He indicates the pain is located medially and posteriorly. He describes the pain as dull and achy, but sharp with certain movements.       Knee Surgical History:  1985 - osteochondroma removal    Past Medical, Social and Family History:  Past Medical History:   Diagnosis Date    Acquired pes planus     Arthralgia of ankle     Calcaneal spur     Chronic hip pain     Colon polyp     COVID-19 01/19/2022    Diabetes mellitus ([address])     recently put on RX for elevated HGB A1C    Diverticulitis     Essential hypertension 02/28/2016    Flat foot     Flat foot 02/28/2016    Gastric polyp     GERD (gastroesophageal reflux disease)     Hallux valgus     High cholesterol     Hyperlipidemia 02/28/2016    Hypertension     Plantar fibromatosis     Posterior tibial tendinitis      Past Surgical History:   Procedure Laterality Date    COLONOSCOPY      CT EPIDURAL STEROID INJECTION (RANDI LUMBAR)  11/27/2019    CT EPIDURAL STEROID INJECTION (RANDI LUMBAR)  6/5/2020    EVACUATION OF HEMATOMA Left     knee    FL INJECTION RIGHT HIP (NON ARTHROGRAM)  10/8/2019    HAND SURGERY      reconstruction of tendons in hand s/p inury/laceration    OSTEOCHONDROMA EXCISION      left knee    [place] ARTHRP ACETBLR/PROX FEM PROSTC AGRFT/ALGRFT Right 2/29/2016    Procedure: ARTHROPLASTY HIP TOTAL ANTERIOR;  Surgeon: Sloan Lesches, MD;  Location: [facility];  Service: Orthopedics    [place] REVJ TOT HIP ARTHRP [address] W/WO AGRFT/ALGRFT Right 1/4/2021    Procedure: ARTHROPLASTY HIP TOTAL REVISION - RIGHT;  Surgeon: Prasad Lawson DO;  Location: [facility];  Service: Orthopedics     No Known Allergies  Current Outpatient Medications on File Prior to Visit   Medication Sig Dispense Refill    b complex vitamins capsule Take 1 capsule by mouth daily      co-enzyme Q-10 30 MG capsule Take 30 mg by mouth 3 (three) times a day      hydrochlorothiazide (HYDRODIURIL) 25 mg tablet       losartan (COZAAR) 100 MG tablet Take 100 mg by mouth      metFORMIN (GLUCOPHAGE-XR) 500 mg 24 hr tablet       pantoprazole (PROTONIX) 40 mg tablet       Semaglutide (OZEMPIC, 0.25 OR 0.5 MG/DOSE, SC) Inject under the skin      simvastatin (ZOCOR) 40 mg tablet Take by mouth       No current facility-administered medications on file prior to visit. Social History     Socioeconomic History    Marital status: /Civil Union     Spouse name: Not on file    Number of children: Not on file    Years of education: Not on file    Highest education level: Not on file   Occupational History    Not on file   Tobacco Use    Smoking status: Never    Smokeless tobacco: Never   Vaping Use    Vaping Use: Never used   Substance and Sexual Activity    Alcohol use:  Yes     Alcohol/week: 28.0 standard drinks of alcohol     Types: 28 Cans of beer per week     Comment: beer or   vodka    Drug use: No    Sexual activity: Not on file   Other Topics Concern    Not on file   Social History Narrative    Not on file     Social Determinants of Health     Financial Resource Strain: Not on file   Food Insecurity: Not on file   Transportation Needs: Not on file   Physical Activity: Not on file   Stress: Not on file   Social Connections: Not on file   Intimate Partner Violence: Not on file   Housing Stability: Not on file       I have reviewed the past medical, surgical, social and family history, medications and allergies as documented in the EMR. Review of systems: ROS is negative other than that noted in the HPI. Constitutional: Negative for fatigue and fever. HENT: Negative for sore throat. Respiratory: Negative for shortness of breath. Cardiovascular: Negative for chest pain. Gastrointestinal: Negative for abdominal pain. Endocrine: Negative for cold intolerance and heat intolerance. Genitourinary: Negative for flank pain. Musculoskeletal: Negative for back pain. Skin: Negative for rash. Allergic/Immunologic: Negative for immunocompromised state. Neurological: Negative for dizziness. Psychiatric/Behavioral: Negative for agitation. Physical Exam:    Blood pressure 124/78, pulse 80, height 5' 11" (1.803 m), weight 119 kg (263 lb). General/Constitutional: NAD, well developed, well nourished  HENT: Normocephalic, atraumatic  CV: Intact distal pulses, regular rate  Resp: No respiratory distress or labored breathing  Abdomen: soft, nondistended   Lymphatic: No lymphadenopathy palpated  Neuro: Alert and Oriented x 3, no focal deficits  Psych: Normal mood, normal affect  Skin: Warm, dry, no rashes, no erythema      Knee Exam:   No significant skin lesions or deformity  Range of motion from 0° to 100°  Medial joint line tenderness   Knee is stable to varus stress, Lachman, and posterior drawer. Gapping with firm endpoint with valgus stress  Neurovascularly intact distally    Knee Imaging    X-rays of the left knee reviewed and interpreted today. These show mild to moderate osteoarthritis of the medial and patellofemoral compartments.     Scribe Attestation I,:  Rhys Marinelli am acting as a scribe while in the presence of the attending physician.:       I,:  Kelsi Reeves MD personally performed the services described in this documentation    as scribed in my presence.:

## 2023-12-07 RX ORDER — TRIAMCINOLONE ACETONIDE 40 MG/ML
40 INJECTION, SUSPENSION INTRA-ARTICULAR; INTRAMUSCULAR
Status: COMPLETED | OUTPATIENT
Start: 2023-12-04 | End: 2023-12-04

## 2023-12-07 RX ORDER — BUPIVACAINE HYDROCHLORIDE 5 MG/ML
4 INJECTION, SOLUTION EPIDURAL; INTRACAUDAL
Status: COMPLETED | OUTPATIENT
Start: 2023-12-04 | End: 2023-12-04

## 2024-01-15 ENCOUNTER — OFFICE VISIT (OUTPATIENT)
Dept: OBGYN CLINIC | Facility: CLINIC | Age: 59
End: 2024-01-15
Payer: COMMERCIAL

## 2024-01-15 VITALS
HEIGHT: 71 IN | SYSTOLIC BLOOD PRESSURE: 151 MMHG | DIASTOLIC BLOOD PRESSURE: 102 MMHG | HEART RATE: 76 BPM | BODY MASS INDEX: 37.8 KG/M2 | WEIGHT: 270 LBS

## 2024-01-15 DIAGNOSIS — M17.12 PRIMARY OSTEOARTHRITIS OF LEFT KNEE: Primary | ICD-10-CM

## 2024-01-15 PROCEDURE — 99212 OFFICE O/P EST SF 10 MIN: CPT | Performed by: ORTHOPAEDIC SURGERY

## 2024-01-15 NOTE — PROGRESS NOTES
Ortho Sports Medicine New Patient Visit     Assesment:   58 y.o. 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 This is most likely evidence of an old MCL injury, but I would like to monitor for change in size or aggressive features over time.       Bhargav is a pleasant 58 year-old male who presents for follow-up evaluation of the left knee.  He does have mild-moderate medial compartment degenerative changes. We will continue to proceed with conservative treatments. He chose to defer physical therapy as he is not currently having any symptoms.  We discussed the option of doing a repeat corticosteroid injection 4 months after the initial injection, but only if he is in pain.     We did also discuss the options for bracing again today.  He does have medial compartment gapping instability, though with a firm endpoint.  I believe this level of instability is related to his osteoarthritis medial compartment.  I believe this instability will benefit from a medial  brace. Which we discussed at his last visit and again today.      Follow up:    Return in about 2 months (around 3/15/2024) for Recheck.      Chief Complaint   Patient presents with    Left Knee - Follow-up       History of Present Illness:    The patient is a 58 y.o. male who presents for follow-up evaluation of the left knee. He was last seen on 12/4/2023 and received a corticosteroid injection of the left knee. He reports experiencing a great deal of relief. He reports the medial aspect of the knee continued to be painful for a week after the injection, but the pain has since subsided. He denies experiencing any pain at today's visit.      Knee Surgical History:  1985 - osteochondroma removal    Past Medical, Social and Family History:  Past Medical  History:   Diagnosis Date    Acquired pes planus     Arthralgia of ankle     Calcaneal spur     Chronic hip pain     Colon polyp     COVID-19 01/19/2022    Diabetes mellitus ([facility])     recently put on RX for elevated HGB A1C    Diverticulitis     Essential hypertension 02/28/2016    Flat foot     Flat foot 02/28/2016    Gastric polyp     GERD (gastroesophageal reflux disease)     Hallux valgus     High cholesterol     Hyperlipidemia 02/28/2016    Hypertension     Plantar fibromatosis     Posterior tibial tendinitis      Past Surgical History:   Procedure Laterality Date    COLONOSCOPY      CT EPIDURAL STEROID INJECTION (RANDI LUMBAR)  11/27/2019    CT EPIDURAL STEROID INJECTION (RANDI LUMBAR)  6/5/2020    EVACUATION OF HEMATOMA Left     knee    FL INJECTION RIGHT HIP (NON ARTHROGRAM)  10/8/2019    HAND SURGERY      reconstruction of tendons in hand s/p inury/laceration    OSTEOCHONDROMA EXCISION      left knee    [place] ARTHRP ACETBLR/PROX FEM PROSTC AGRFT/ALGRFT Right 2/29/2016    Procedure: ARTHROPLASTY HIP TOTAL ANTERIOR;  Surgeon: Gideon Winter MD;  Location:  MAIN OR;  Service: Orthopedics    [place] REVJ TOT HIP ARTHRP BTH W/WO AGRFT/ALGRFT Right 1/4/2021    Procedure: ARTHROPLASTY HIP TOTAL REVISION - RIGHT;  Surgeon: Anjel Cooper DO;  Location: [facility];  Service: Orthopedics     No Known Allergies  Current Outpatient Medications on File Prior to Visit   Medication Sig Dispense Refill    b complex vitamins capsule Take 1 capsule by mouth daily      co-enzyme Q-10 30 MG capsule Take 30 mg by mouth 3 (three) times a day      hydrochlorothiazide (HYDRODIURIL) 25 mg tablet       pantoprazole (PROTONIX) 40 mg tablet       Semaglutide (OZEMPIC, 0.25 OR 0.5 MG/DOSE, SC) Inject under the skin      simvastatin (ZOCOR) 40 mg tablet Take by mouth      losartan (COZAAR) 100 MG tablet Take 100 mg by mouth      metFORMIN (GLUCOPHAGE-XR) 500 mg 24 hr tablet        No current facility-administered medications on file prior to  visit.     Social History     Socioeconomic History    Marital status: /Civil Union     Spouse name: Not on file    Number of children: Not on file    Years of education: Not on file    Highest education level: Not on file   Occupational History    Not on file   Tobacco Use    Smoking status: Never    Smokeless tobacco: Never   Vaping Use    Vaping status: Never Used   Substance and Sexual Activity    Alcohol use: Yes     Alcohol/week: 28.0 standard drinks of alcohol     Types: 28 Cans of beer per week     Comment: beer or   vodka    Drug use: No    Sexual activity: Not on file   Other Topics Concern    Not on file   Social History Narrative    Not on file     Social Determinants of Health     Financial Resource Strain: Low Risk  (11/10/2023)    Received from [facility]    Overall Financial Resource Strain (CARDIA)     Difficulty of Paying Living Expenses: Not hard at all   Food Insecurity: No Food Insecurity (11/10/2023)    Received from [facility]    Hunger Vital Sign     Worried About Running Out of Food in the Last Year: Never true     Ran Out of Food in the Last Year: Never true   Transportation Needs: No Transportation Needs (11/10/2023)    Received from [facility]    PRAPARE - Transportation     Lack of Transportation (Medical): No     Lack of Transportation (Non-Medical): No   Physical Activity: Inactive (7/5/2023)    Received from [facility]    Exercise Vital Sign     Days of Exercise per Week: 0 days     Minutes of Exercise per Session: 0 min   Stress: No Stress Concern Present (3/8/2022)    Received from [facility]    [employer] - Occupational Stress Questionnaire     Feeling of Stress : Not at all   Social Connections: Socially Integrated (11/10/2023)    Received from [facility]    Social Connection and Isolation Panel [NHANES]     Frequency of Communication with Friends and Family: More than three times a week     Frequency of Social  "Gatherings with Friends and Family: More than three times a week     Attends Confucianist Services: More than 4 times per year     Active Member of Clubs or Organizations: Yes     Attends Club or Organization Meetings: More than 4 times per year     Marital Status:    Intimate Partner Violence: Not At Risk (11/10/2023)    Received from [facility]    Humiliation, Afraid, Rape, and Kick questionnaire     Fear of Current or Ex-Partner: No     Emotionally Abused: No     Physically Abused: No     Sexually Abused: No   Housing Stability: Unknown (11/10/2023)    Received from [facility]    Housing Stability Vital Sign     Unable to Pay for Housing in the Last Year: No     Number of Places Lived in the Last Year: Not on file     Unstable Housing in the Last Year: Not on file       I have reviewed the past medical, surgical, social and family history, medications and allergies as documented in the EMR.    Review of systems: ROS is negative other than that noted in the HPI.  Constitutional: Negative for fatigue and fever.   HENT: Negative for sore throat.    Respiratory: Negative for shortness of breath.    Cardiovascular: Negative for chest pain.   Gastrointestinal: Negative for abdominal pain.   Endocrine: Negative for cold intolerance and heat intolerance.   Genitourinary: Negative for flank pain.   Musculoskeletal: Negative for back pain.   Skin: Negative for rash.   Allergic/Immunologic: Negative for immunocompromised state.   Neurological: Negative for dizziness.   Psychiatric/Behavioral: Negative for agitation.      Physical Exam:    Blood pressure (!) 151/102, pulse 76, height 5' 11\" (1.803 m), weight 122 kg (270 lb).    General/Constitutional: NAD, well developed, well nourished  HENT: Normocephalic, atraumatic  CV: Intact distal pulses, regular rate  Resp: No respiratory distress or labored breathing  Abdomen: soft, nondistended   Lymphatic: No lymphadenopathy palpated  Neuro: Alert and Oriented x 3, " no focal deficits  Psych: Normal mood, normal affect  Skin: Warm, dry, no rashes, no erythema      Knee Exam:   No significant skin lesions or deformity  Range of motion from 0° to 125°  No medial joint line tenderness   Knee is stable to varus stress, Lachman, and posterior drawer.  Gapping with firm endpoint with valgus stress  Neurovascularly intact distally    Knee Imaging    X-rays of the left knee reviewed and interpreted today. These show mild to moderate osteoarthritis of the medial and patellofemoral compartments.    Scribe Attestation      I,:  Shakira Hernandez am acting as a scribe while in the presence of the attending physician.:       I,:  Bryant Turner MD personally performed the services described in this documentation    as scribed in my presence.:

## 2024-03-14 ENCOUNTER — OFFICE VISIT (OUTPATIENT)
Dept: OBGYN CLINIC | Facility: CLINIC | Age: 59
End: 2024-03-14
Payer: COMMERCIAL

## 2024-03-14 ENCOUNTER — APPOINTMENT (OUTPATIENT)
Dept: RADIOLOGY | Facility: CLINIC | Age: 59
End: 2024-03-14
Payer: COMMERCIAL

## 2024-03-14 VITALS
SYSTOLIC BLOOD PRESSURE: 158 MMHG | HEIGHT: 71 IN | WEIGHT: 273 LBS | BODY MASS INDEX: 38.22 KG/M2 | DIASTOLIC BLOOD PRESSURE: 80 MMHG | HEART RATE: 81 BPM

## 2024-03-14 DIAGNOSIS — Z01.89 ENCOUNTER FOR LOWER EXTREMITY COMPARISON IMAGING STUDY: ICD-10-CM

## 2024-03-14 DIAGNOSIS — Z01.89 ENCOUNTER FOR LOWER EXTREMITY COMPARISON IMAGING STUDY: Primary | ICD-10-CM

## 2024-03-14 PROCEDURE — 73564 X-RAY EXAM KNEE 4 OR MORE: CPT

## 2024-03-14 PROCEDURE — 99212 OFFICE O/P EST SF 10 MIN: CPT | Performed by: ORTHOPAEDIC SURGERY

## 2024-03-18 ENCOUNTER — OFFICE VISIT (OUTPATIENT)
Dept: OBGYN CLINIC | Facility: OTHER | Age: 59
End: 2024-03-18
Payer: COMMERCIAL

## 2024-03-18 VITALS
HEIGHT: 71 IN | SYSTOLIC BLOOD PRESSURE: 164 MMHG | BODY MASS INDEX: 37.8 KG/M2 | HEART RATE: 70 BPM | WEIGHT: 270 LBS | DIASTOLIC BLOOD PRESSURE: 99 MMHG

## 2024-03-18 DIAGNOSIS — M75.82 ROTATOR CUFF TENDONITIS, LEFT: ICD-10-CM

## 2024-03-18 DIAGNOSIS — M75.52 SUBACROMIAL BURSITIS OF LEFT SHOULDER JOINT: Primary | ICD-10-CM

## 2024-03-18 PROCEDURE — 99213 OFFICE O/P EST LOW 20 MIN: CPT | Performed by: ORTHOPAEDIC SURGERY

## 2024-03-18 PROCEDURE — 20610 DRAIN/INJ JOINT/BURSA W/O US: CPT | Performed by: ORTHOPAEDIC SURGERY

## 2024-03-18 RX ORDER — BUPIVACAINE HYDROCHLORIDE 2.5 MG/ML
2 INJECTION, SOLUTION INFILTRATION; PERINEURAL
Status: COMPLETED | OUTPATIENT
Start: 2024-03-18 | End: 2024-03-18

## 2024-03-18 RX ORDER — BETAMETHASONE SODIUM PHOSPHATE AND BETAMETHASONE ACETATE 3; 3 MG/ML; MG/ML
6 INJECTION, SUSPENSION INTRA-ARTICULAR; INTRALESIONAL; INTRAMUSCULAR; SOFT TISSUE
Status: COMPLETED | OUTPATIENT
Start: 2024-03-18 | End: 2024-03-18

## 2024-03-18 RX ADMIN — BUPIVACAINE HYDROCHLORIDE 2 ML: 2.5 INJECTION, SOLUTION INFILTRATION; PERINEURAL at 10:30

## 2024-03-18 RX ADMIN — BETAMETHASONE SODIUM PHOSPHATE AND BETAMETHASONE ACETATE 6 MG: 3; 3 INJECTION, SUSPENSION INTRA-ARTICULAR; INTRALESIONAL; INTRAMUSCULAR; SOFT TISSUE at 10:30

## 2024-03-18 NOTE — PROGRESS NOTES
"  Assessment  Diagnoses and all orders for this visit:    Subacromial bursitis of left shoulder joint    Rotator cuff tendonitis, left    Discussion and Plan:    Patients examination and symptoms continue to be consistent with subacromial impingement of the left shoulder. He was provided with a repeat cortisone injection at his request today in the office. This is documented appropriately below  Continue home exercise program as tolerated  If his symptoms worsen again and this injection does not provide him with benefit then we could consider an MRI of the left shoulder to further evaluate the rotator cuff. He understood and all questions were answered.  Follow up on an as needed basis    Subjective:   Patient ID: Bhargav Roberto is a 59 y.o. male presents today for a follow up visit for left shoulder pain. Patient reports that on his visit on 11/16/23 and provided with an injection that gave him significant benefit until about last week, The pain worse over the past few weeks and is not associated with an acute injury. The patient describes the pain as aching, dull, and sharp in intensity,  intermittent in timing, and localizes the pain to the  left lateral shoulder.  The pain is worse with movement, overhead work, and overuse and relieved by rest. 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 He is requesting a repeat cortisone injection today. No numbness or tingling. No fevers or chills.     The following portions of the patient's history were reviewed and updated as appropriate: allergies, current medications, past family history, past medical history, past social history, past surgical history and problem list.    Objective:  /99 (BP Location: Left arm, Patient Position: Sitting, Cuff Size: Large)   Pulse 70   Ht 5' 11\" (1.803 m)   Wt 122 kg (270 lb)   BMI 37.66 kg/m²       Left Shoulder Exam     Range of Motion " "  The patient has normal left shoulder ROM.    Muscle Strength   Abduction: 4/5   External rotation: 4/5     Tests   Lerma test: positive  Impingement: positive    Other   Erythema: absent  Pulse: present             Physical Exam  Constitutional:       General: He is not in acute distress.     Appearance: He is well-developed.   Eyes:      Conjunctiva/sclera: Conjunctivae normal.      Pupils: Pupils are equal, round, and reactive to light.   Cardiovascular:      Rate and Rhythm: Normal rate and regular rhythm.   Pulmonary:      Effort: Pulmonary effort is normal.      Breath sounds: Normal breath sounds.   Abdominal:      General: Bowel sounds are normal.      Palpations: Abdomen is soft.   Musculoskeletal:      Cervical back: Normal range of motion and neck supple.   Skin:     General: Skin is warm and dry.      Findings: No erythema or rash.   Neurological:      Mental Status: He is alert and oriented to person, place, and time.      Deep Tendon Reflexes: Reflexes are normal and symmetric.   Psychiatric:         Behavior: Behavior normal.       Large joint arthrocentesis: L subacromial bursa  Universal Protocol:  Consent: Verbal consent obtained.  Risks and benefits: risks, benefits and alternatives were discussed  Consent given by: patient  Time out: Immediately prior to procedure a \"time out\" was called to verify the correct patient, procedure, equipment, support staff and site/side marked as required.  Site marked: the operative site was marked  Radiology Images displayed and confirmed. If images not available, report reviewed: imaging studies available  Patient identity confirmed: verbally with patient  Supporting Documentation  Indications: pain and diagnostic evaluation   Procedure Details  Location: shoulder - L subacromial bursa  Preparation: Patient was prepped and draped in the usual sterile fashion  Needle size: 22 G  Ultrasound guidance: no  Approach: lateral  Medications administered: 2 mL " bupivacaine 0.25 %; 6 mg betamethasone acetate-betamethasone sodium phosphate 6 (3-3) mg/mL    Patient tolerance: patient tolerated the procedure well with no immediate complications  Dressing:  Sterile dressing applied        I have personally reviewed pertinent office notes from his previous visit on 11/16/23    Scribe Attestation      I,:  Kenyon King am acting as a scribe while in the presence of the attending physician.:       I,:  Tolu Bains MD personally performed the services described in this documentation    as scribed in my presence.:

## 2024-03-18 NOTE — PROGRESS NOTES
Ortho Sports Medicine follow-up patient Visit     Assesment:   59 y.o. male with knee DJD and history of previous MCL injury.    Plan:      Patient returns today for follow-up x-ray to ensure that the calcific density in the medial epicondyle is related to known MCL injury and not any type of new bony growth.  There is no significant change since his last x-ray.  I am comfortable saying that this is simply a sign of a previous MCL injury and not something has to be followed further.  He has had excellent relief of symptoms since last visit as well.  He is can plan on following up as needed in the future if symptoms return.    Follow up:    No follow-ups on file.      Chief Complaint   Patient presents with    Left Knee - Follow-up       History of Present Illness:    The patient is a 59 y.o. male who returns for follow-up of his knee pain and bony growth.  No significant changes since last visit.  He has no pain today.  He has been functioning very well at work.  Denies any new numbness or tingling.      Knee Surgical History:  1985 - osteochondroma removal    Past Medical, Social and Family History:  Past Medical History:   Diagnosis Date    Acquired pes planus     Arthralgia of ankle     Calcaneal spur     Chronic hip pain     Colon polyp     COVID-19 01/19/2022    Diabetes mellitus (HCC)     recently put on RX for elevated HGB A1C    Diverticulitis     Essential hypertension 02/28/2016    Flat foot     Flat foot 02/28/2016    Gastric polyp     GERD (gastroesophageal reflux disease)     Hallux valgus     High cholesterol     Hyperlipidemia 02/28/2016    Hypertension     Plantar fibromatosis     Posterior tibial tendinitis      Past Surgical History:   Procedure Laterality Date    COLONOSCOPY      CT EPIDURAL STEROID INJECTION (RANDI LUMBAR)  11/27/2019    CT EPIDURAL STEROID INJECTION (RANDI LUMBAR)  6/5/2020    EVACUATION OF HEMATOMA Left     knee    FL INJECTION RIGHT HIP (NON ARTHROGRAM)  10/8/2019    HAND SURGERY       reconstruction of tendons in hand s/p inury/laceration    OSTEOCHONDROMA EXCISION      left knee    OK ARTHRP ACETBLR/PROX FEM PROSTC AGRFT/ALGRFT Right 2/29/2016    Procedure: ARTHROPLASTY HIP TOTAL ANTERIOR;  Surgeon: Gideon Winter MD;  Location:  MAIN OR;  Service: Orthopedics    OK REVJ TOT HIP ARTHRP BTH W/WO AGRFT/ALGRFT Right 1/4/2021    Procedure: ARTHROPLASTY HIP TOTAL REVISION - RIGHT;  Surgeon: Anjel Cooper DO;  Location: [facility];  Service: Orthopedics     No Known Allergies  Current Outpatient Medications on File Prior to Visit   Medication Sig Dispense Refill    b complex vitamins capsule Take 1 capsule by mouth daily      co-enzyme Q-10 30 MG capsule Take 30 mg by mouth 3 (three) times a day      hydrochlorothiazide (HYDRODIURIL) 25 mg tablet       losartan (COZAAR) 100 MG tablet Take 100 mg by mouth      metFORMIN (GLUCOPHAGE-XR) 500 mg 24 hr tablet       pantoprazole (PROTONIX) 40 mg tablet       Semaglutide (OZEMPIC, 0.25 OR 0.5 MG/DOSE, SC) Inject under the skin      simvastatin (ZOCOR) 40 mg tablet Take by mouth       No current facility-administered medications on file prior to visit.     Social History     Socioeconomic History    Marital status: /Civil Union     Spouse name: Not on file    Number of children: Not on file    Years of education: Not on file    Highest education level: Not on file   Occupational History    Not on file   Tobacco Use    Smoking status: Never    Smokeless tobacco: Never   Vaping Use    Vaping status: Never Used   Substance and Sexual Activity    Alcohol use: Yes     Alcohol/week: 28.0 standard drinks of alcohol     Types: 28 Cans of beer per week     Comment: beer or   vodka    Drug use: No    Sexual activity: Not on file   Other Topics Concern    Not on file   Social History Narrative    Not on file     Social Determinants of Health     Financial Resource Strain: Low Risk  (11/10/2023)    Received from [facility]  Resource Strain (CARDIA)     Difficulty of Paying Living Expenses: Not hard at all   Food Insecurity: No Food Insecurity (11/10/2023)    Received from [facility]    Hunger Vital Sign     Worried About Running Out of Food in the Last Year: Never true     Ran Out of Food in the Last Year: Never true   Transportation Needs: No Transportation Needs (11/10/2023)    Received from [facility]    PRAPARE - Transportation     Lack of Transportation (Medical): No     Lack of Transportation (Non-Medical): No   Physical Activity: Inactive (7/5/2023)    Received from [facility]    Exercise Vital Sign     Days of Exercise per Week: 0 days     Minutes of Exercise per Session: 0 min   Stress: No Stress Concern Present (3/8/2022)    Received from [facility]    Citizen of the [place] of Occupational Health - Occupational Stress Questionnaire     Feeling of Stress : Not at all   Social Connections: Socially Integrated (11/10/2023)    Received from [facility]    Social Connection and Isolation Panel [NHANES]     Frequency of Communication with Friends and Family: More than three times a week     Frequency of Social Gatherings with Friends and Family: More than three times a week     Attends Buddhist Services: More than 4 times per year     Active Member of Clubs or Organizations: Yes     Attends Club or Organization Meetings: More than 4 times per year     Marital Status:    Intimate Partner Violence: Not At Risk (11/10/2023)    Received from [facility]    Humiliation, Afraid, Rape, and Kick questionnaire     Fear of Current or Ex-Partner: No     Emotionally Abused: No     Physically Abused: No     Sexually Abused: No   Housing Stability: Unknown (11/10/2023)    Received from [facility]    Housing Stability Vital Sign     Unable to Pay for Housing in the Last Year: No     Number of Places Lived in the Last Year: Not on file     Unstable Housing in the Last Year: Not on file       I have reviewed the  "past medical, surgical, social and family history, medications and allergies as documented in the EMR.    Review of systems: ROS is negative other than that noted in the HPI.  Constitutional: Negative for fatigue and fever.   HENT: Negative for sore throat.    Respiratory: Negative for shortness of breath.    Cardiovascular: Negative for chest pain.   Gastrointestinal: Negative for abdominal pain.   Endocrine: Negative for cold intolerance and heat intolerance.   Genitourinary: Negative for flank pain.   Musculoskeletal: Negative for back pain.   Skin: Negative for rash.   Allergic/Immunologic: Negative for immunocompromised state.   Neurological: Negative for dizziness.   Psychiatric/Behavioral: Negative for agitation.      Physical Exam:    Blood pressure 158/80, pulse 81, height 5' 11\" (1.803 m), weight 124 kg (273 lb).    General/Constitutional: NAD, well developed, well nourished  HENT: Normocephalic, atraumatic  CV: Intact distal pulses, regular rate  Resp: No respiratory distress or labored breathing  Abdomen: soft, nondistended   Lymphatic: No lymphadenopathy palpated  Neuro: Alert and Oriented x 3, no focal deficits  Psych: Normal mood, normal affect  Skin: Warm, dry, no rashes, no erythema      Knee Exam:   No significant skin lesions or deformity  Range of motion from 0° to 125°  No medial joint line tenderness   Knee is stable to varus stress, Lachman, and posterior drawer.  Gapping with firm endpoint with valgus stress  Neurovascularly intact distally    Knee Imaging    New x-rays reviewed and interpreted showing no change in bony growth at the medial condyle.  This is consistent with an old MCL injury.          "

## 2024-06-10 ENCOUNTER — OFFICE VISIT (OUTPATIENT)
Dept: OBGYN CLINIC | Facility: CLINIC | Age: 59
End: 2024-06-10
Payer: COMMERCIAL

## 2024-06-10 VITALS
HEART RATE: 96 BPM | SYSTOLIC BLOOD PRESSURE: 146 MMHG | BODY MASS INDEX: 37.8 KG/M2 | HEIGHT: 71 IN | DIASTOLIC BLOOD PRESSURE: 85 MMHG | WEIGHT: 270 LBS

## 2024-06-10 DIAGNOSIS — M17.12 PRIMARY OSTEOARTHRITIS OF LEFT KNEE: Primary | ICD-10-CM

## 2024-06-10 PROCEDURE — 20610 DRAIN/INJ JOINT/BURSA W/O US: CPT | Performed by: ORTHOPAEDIC SURGERY

## 2024-06-10 PROCEDURE — 99213 OFFICE O/P EST LOW 20 MIN: CPT | Performed by: ORTHOPAEDIC SURGERY

## 2024-06-10 RX ORDER — TRIAMCINOLONE ACETONIDE 40 MG/ML
40 INJECTION, SUSPENSION INTRA-ARTICULAR; INTRAMUSCULAR
Status: COMPLETED | OUTPATIENT
Start: 2024-06-10 | End: 2024-06-10

## 2024-06-10 RX ORDER — BUPIVACAINE HYDROCHLORIDE 5 MG/ML
4 INJECTION, SOLUTION EPIDURAL; INTRACAUDAL
Status: COMPLETED | OUTPATIENT
Start: 2024-06-10 | End: 2024-06-10

## 2024-06-10 RX ADMIN — TRIAMCINOLONE ACETONIDE 40 MG: 40 INJECTION, SUSPENSION INTRA-ARTICULAR; INTRAMUSCULAR at 15:00

## 2024-06-10 RX ADMIN — BUPIVACAINE HYDROCHLORIDE 4 ML: 5 INJECTION, SOLUTION EPIDURAL; INTRACAUDAL at 15:00

## 2024-06-10 NOTE — PROGRESS NOTES
"    Ortho Sports Medicine follow-up patient Visit     Assesment:   59 y.o. male with left knee djd     Plan:      Bhargav continues to have knee pain localized to the medial aspect the knee.  He has no locking episodes.  He has mild degenerative changes in the medial compartment on that side.  His pain is worse after prolonged rest but improves after getting up and walking around.  I explained that this continues to be most consistent with osteoarthritis.  We discussed that it is possible that he has a meniscus tear as well but that an arthroscopic procedure for this would not reliably relieve his pain based on the presentation and symptoms he is having.  Organ to continue with conservative treatment options including a repeat corticosteroid injection to the left knee that was well-tolerated today.  He is can to follow-up with me as needed in the future.    Large joint arthrocentesis: L knee  Universal Protocol:  Consent: Verbal consent obtained.  Risks and benefits: risks, benefits and alternatives were discussed  Consent given by: patient  Time out: Immediately prior to procedure a \"time out\" was called to verify the correct patient, procedure, equipment, support staff and site/side marked as required.  Patient understanding: patient states understanding of the procedure being performed  Patient consent: the patient's understanding of the procedure matches consent given  Procedure consent: procedure consent matches procedure scheduled  Relevant documents: relevant documents present and verified  Patient identity confirmed: verbally with patient  Supporting Documentation  Indications: pain   Procedure Details  Location: knee - L knee  Preparation: Patient was prepped and draped in the usual sterile fashion  Needle size: 22 G  Ultrasound guidance: no  Approach: superior  Medications administered: 4 mL bupivacaine (PF) 0.5 %; 40 mg triamcinolone acetonide 40 mg/mL    Patient tolerance: patient tolerated the procedure " well with no immediate complications  Dressing:  Sterile dressing applied          Chief Complaint   Patient presents with    Left Knee - Follow-up       History of Present Illness:    The patient is a 59 y.o. male who returns for follow-up of his knee pain.  He has had no recent injuries.  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  Over the last week and a half he has noticed return of mild swelling as well as sharp pain in the medial aspect of his knee.  This is worst after standing and moving after prolonged period of immobility.  He does not have any significant locking.  He has not have any giving out or instability.  He is able to complete his work tasks at this point but the pain is beginning to make this more challenging.      Knee Surgical History:  1985 - osteochondroma removal    Past Medical, Social and Family History:  Past Medical History:   Diagnosis Date    Acquired pes planus     Arthralgia of ankle     Calcaneal spur     Chronic hip pain     Colon polyp     COVID-19 01/19/2022    Diabetes mellitus (HCC)     recently put on RX for elevated HGB A1C    Diverticulitis     Essential hypertension 02/28/2016    Flat foot     Flat foot 02/28/2016    Gastric polyp     GERD (gastroesophageal reflux disease)     Hallux valgus     High cholesterol     Hyperlipidemia 02/28/2016    Hypertension     Plantar fibromatosis     Posterior tibial tendinitis      Past Surgical History:   Procedure Laterality Date    COLONOSCOPY      CT EPIDURAL STEROID INJECTION (RANDI LUMBAR)  11/27/2019    CT EPIDURAL STEROID INJECTION (RANDI LUMBAR)  06/05/2020    EVACUATION OF HEMATOMA Left     knee    FL INJECTION RIGHT HIP (NON ARTHROGRAM)  10/08/2019    HAND SURGERY      reconstruction of tendons in hand s/p inury/laceration    HIP SURGERY  1/21    OSTEOCHONDROMA EXCISION      left knee    PA ARTHRP ACETBLR/PROX FEM PROSTC AGRFT/ALGRFT Right 02/29/2016    Procedure: ARTHROPLASTY HIP TOTAL ANTERIOR;  Surgeon: Gideon Winter MD;   Location:  MAIN OR;  Service: Orthopedics    NM REVJ TOT HIP ARTHRP BTH W/WO AGRFT/ALGRFT Right 01/04/2021    Procedure: ARTHROPLASTY HIP TOTAL REVISION - RIGHT;  Surgeon: Anjel Cooper DO;  Location: [facility];  Service: Orthopedics     No Known Allergies  Current Outpatient Medications on File Prior to Visit   Medication Sig Dispense Refill    b complex vitamins capsule Take 1 capsule by mouth daily      co-enzyme Q-10 30 MG capsule Take 30 mg by mouth 3 (three) times a day      hydrochlorothiazide (HYDRODIURIL) 25 mg tablet       pantoprazole (PROTONIX) 40 mg tablet       Semaglutide (OZEMPIC, 0.25 OR 0.5 MG/DOSE, SC) Inject under the skin      simvastatin (ZOCOR) 40 mg tablet Take by mouth      losartan (COZAAR) 100 MG tablet Take 100 mg by mouth      metFORMIN (GLUCOPHAGE-XR) 500 mg 24 hr tablet        No current facility-administered medications on file prior to visit.     Social History     Socioeconomic History    Marital status: /Civil Union     Spouse name: Not on file    Number of children: Not on file    Years of education: Not on file    Highest education level: Not on file   Occupational History    Not on file   Tobacco Use    Smoking status: Never    Smokeless tobacco: Never   Vaping Use    Vaping status: Never Used   Substance and Sexual Activity    Alcohol use: Yes     Alcohol/week: 14.0 standard drinks of alcohol     Types: 7 Cans of beer, 7 Standard drinks or equivalent per week     Comment: beer or   vodka    Drug use: No    Sexual activity: Yes     Partners: Female     Birth control/protection: Surgical   Other Topics Concern    Not on file   Social History Narrative    Not on file     Social Determinants of Health     Financial Resource Strain: Low Risk  (11/10/2023)    [facility]    Overall Financial Resource Strain (CARDIA)     Difficulty of Paying Living Expenses: Not hard at all   Food Insecurity: No Food Insecurity (11/10/2023)    Received  from [email or web address]    Hunger Vital Sign     Worried About Running Out of Food in the Last Year: Never true     Ran Out of Food in the Last Year: Never true   Transportation Needs: No Transportation Needs (11/10/2023)    Received from [email or web address]    PRAPARE - Transportation     Lack of Transportation (Medical): No     Lack of Transportation (Non-Medical): No   Physical Activity: Inactive (7/5/2023)    Received from [facility]    Exercise Vital Sign     Days of Exercise per Week: 0 days     Minutes of Exercise per Session: 0 min   Stress: No Stress Concern Present (3/8/2022)    Received from [email or web address]    [place] of Occupational Health - Occupational Stress Questionnaire     Feeling of Stress : Not at all   Social Connections: Socially Integrated (11/10/2023)    Received from [email or web address]    Social Connection and Isolation Panel [NHANES]     Frequency of Communication with Friends and Family: More than three times a week     Frequency of Social Gatherings with Friends and Family: More than three times a week     Attends Yarsani Services: More than 4 times per year     Active Member of Clubs or Organizations: Yes     Attends Club or Organization Meetings: More than 4 times per year     Marital Status:    Intimate Partner Violence: Not At Risk (11/10/2023)    Received from [email or web address]    Humiliation, Afraid, Rape, and Kick questionnaire     Fear of Current or Ex-Partner: No     Emotionally Abused: No     Physically Abused: No     Sexually Abused: No   Housing Stability: Unknown (11/10/2023)    Received from [facility]    Housing Stability Vital Sign     Unable to Pay for Housing in the Last Year: No     Number of Places Lived in the Last Year: Not on file     Unstable Housing in the Last Year: Not on file       I have reviewed the past medical, surgical,  "social and family history, medications and allergies as documented in the EMR.    Review of systems: ROS is negative other than that noted in the HPI.  Constitutional: Negative for fatigue and fever.   HENT: Negative for sore throat.    Respiratory: Negative for shortness of breath.    Cardiovascular: Negative for chest pain.   Gastrointestinal: Negative for abdominal pain.   Endocrine: Negative for cold intolerance and heat intolerance.   Genitourinary: Negative for flank pain.   Musculoskeletal: Negative for back pain.   Skin: Negative for rash.   Allergic/Immunologic: Negative for immunocompromised state.   Neurological: Negative for dizziness.   Psychiatric/Behavioral: Negative for agitation.      Physical Exam:    Blood pressure 146/85, pulse 96, height 5' 11\" (1.803 m), weight 122 kg (270 lb).    General/Constitutional: NAD, well developed, well nourished  HENT: Normocephalic, atraumatic  CV: Intact distal pulses, regular rate  Resp: No respiratory distress or labored breathing  Abdomen: soft, nondistended   Lymphatic: No lymphadenopathy palpated  Neuro: Alert and Oriented x 3, no focal deficits  Psych: Normal mood, normal affect  Skin: Warm, dry, no rashes, no erythema      Knee Exam:   No significant skin lesions or deformity  Range of motion from 0° to 125°  No medial joint line tenderness   Knee is stable to varus stress, Lachman, and posterior drawer.  Gapping with firm endpoint with valgus stress  Neurovascularly intact distally    Knee Imaging    X-rays reviewed again showing mild medial joint space narrowing without any acute fractures.          "

## 2024-06-11 ENCOUNTER — TELEPHONE (OUTPATIENT)
Age: 59
End: 2024-06-11

## 2024-06-11 NOTE — TELEPHONE ENCOUNTER
Caller: Bhargav Roberto    Doctor: Dr. Butcher    Reason for call: checked chart for date last seen/2022/scheduled as NP as  was not [facility] in 2022    Call back#: NA

## 2024-08-09 ENCOUNTER — TELEPHONE (OUTPATIENT)
Age: 59
End: 2024-08-09

## 2024-09-12 ENCOUNTER — TELEPHONE (OUTPATIENT)
Dept: OBGYN CLINIC | Facility: CLINIC | Age: 59
End: 2024-09-12

## 2024-09-12 ENCOUNTER — OFFICE VISIT (OUTPATIENT)
Dept: OBGYN CLINIC | Facility: CLINIC | Age: 59
End: 2024-09-12
Payer: COMMERCIAL

## 2024-09-12 VITALS
HEIGHT: 71 IN | SYSTOLIC BLOOD PRESSURE: 148 MMHG | DIASTOLIC BLOOD PRESSURE: 92 MMHG | HEART RATE: 79 BPM | WEIGHT: 270 LBS | BODY MASS INDEX: 37.8 KG/M2

## 2024-09-12 DIAGNOSIS — M17.12 PRIMARY OSTEOARTHRITIS OF LEFT KNEE: Primary | ICD-10-CM

## 2024-09-12 PROCEDURE — 99213 OFFICE O/P EST LOW 20 MIN: CPT | Performed by: ORTHOPAEDIC SURGERY

## 2024-09-12 PROCEDURE — 20610 DRAIN/INJ JOINT/BURSA W/O US: CPT | Performed by: ORTHOPAEDIC SURGERY

## 2024-09-12 RX ADMIN — BUPIVACAINE HYDROCHLORIDE 4 ML: 5 INJECTION, SOLUTION EPIDURAL; INTRACAUDAL at 08:30

## 2024-09-12 RX ADMIN — TRIAMCINOLONE ACETONIDE 40 MG: 40 INJECTION, SUSPENSION INTRA-ARTICULAR; INTRAMUSCULAR at 08:30

## 2024-09-13 RX ORDER — BUPIVACAINE HYDROCHLORIDE 5 MG/ML
4 INJECTION, SOLUTION EPIDURAL; INTRACAUDAL
Status: COMPLETED | OUTPATIENT
Start: 2024-09-12 | End: 2024-09-12

## 2024-09-13 RX ORDER — TRIAMCINOLONE ACETONIDE 40 MG/ML
40 INJECTION, SUSPENSION INTRA-ARTICULAR; INTRAMUSCULAR
Status: COMPLETED | OUTPATIENT
Start: 2024-09-12 | End: 2024-09-12

## 2024-09-13 NOTE — PROGRESS NOTES
"    Ortho Sports Medicine follow-up patient Visit     Assesment:   59 y.o. male with left knee djd, medial compartment     Plan:      Bhargav did well with his last injection but pain has increased again.  Still not having any locking.  He is interested in another injection today which was provided and well-tolerated.  He does have instability with valgus stress with a firm endpoint consistent with medial compartment degenerative changes.  I recommended a medial  brace for additional treatment of these issues and pain relief.  He was interested in this brace.  Will set him up with the brace fitter for the brace.  He is going to follow-up with me as needed in the future if his pain returns.    Large joint arthrocentesis: L knee  Universal Protocol:  Consent: Verbal consent obtained.  Risks and benefits: risks, benefits and alternatives were discussed  Consent given by: patient  Time out: Immediately prior to procedure a \"time out\" was called to verify the correct patient, procedure, equipment, support staff and site/side marked as required.  Patient understanding: patient states understanding of the procedure being performed  Patient consent: the patient's understanding of the procedure matches consent given  Procedure consent: procedure consent matches procedure scheduled  Relevant documents: relevant documents present and verified  Patient identity confirmed: verbally with patient  Supporting Documentation  Indications: pain   Procedure Details  Location: knee - L knee  Preparation: Patient was prepped and draped in the usual sterile fashion  Needle size: 22 G  Ultrasound guidance: no  Approach: superior  Medications administered: 4 mL bupivacaine (PF) 0.5 %; 40 mg triamcinolone acetonide 40 mg/mL    Patient tolerance: patient tolerated the procedure well with no immediate complications  Dressing:  Sterile dressing applied          Chief Complaint   Patient presents with    Left Knee - Follow-up       History " of Present Illness:    The patient is a 59 y.o. male who returns for follow-up of his knee pain.  Continues to have medial sided left knee pain.  Has some intermittent swelling but not too much.  He did get good relief from his last injection but it has worn off now.  He has some feelings of instability when he has a valgus moment.  However no locking and no true instability.      Knee Surgical History:  1985 - osteochondroma removal    Past Medical, Social and Family History:  Past Medical History:   Diagnosis Date    Acquired pes planus     Arthralgia of ankle     Calcaneal spur     Chronic hip pain     Colon polyp     COVID-19 01/19/2022    Diabetes mellitus (HCC)     recently put on RX for elevated HGB A1C    Diverticulitis     Essential hypertension 02/28/2016    Flat foot     Flat foot 02/28/2016    Gastric polyp     GERD (gastroesophageal reflux disease)     Hallux valgus     High cholesterol     Hyperlipidemia 02/28/2016    Hypertension     Plantar fibromatosis     Posterior tibial tendinitis      Past Surgical History:   Procedure Laterality Date    COLONOSCOPY      CT EPIDURAL STEROID INJECTION (RANDI LUMBAR)  11/27/2019    CT EPIDURAL STEROID INJECTION (RANDI LUMBAR)  06/05/2020    EVACUATION OF HEMATOMA Left     knee    FL INJECTION RIGHT HIP (NON ARTHROGRAM)  10/08/2019    HAND SURGERY      reconstruction of tendons in hand s/p inury/laceration    HIP SURGERY  1/21    OSTEOCHONDROMA EXCISION      left knee    [place] ARTHRP ACETBLR/PROX FEM PROSTC AGRFT/ALGRFT Right 02/29/2016    Procedure: ARTHROPLASTY HIP TOTAL ANTERIOR;  Surgeon: Gideon Winter MD;  Location:  MAIN OR;  Service: Orthopedics    [place] REVJ TOT HIP ARTHRP BTH W/WO AGRFT/ALGRFT Right 01/04/2021    Procedure: ARTHROPLASTY HIP TOTAL REVISION - RIGHT;  Surgeon: Anjel Cooper DO;  Location: [facility];  Service: Orthopedics     No Known Allergies  Current Outpatient Medications on File Prior to Visit   Medication Sig Dispense Refill    b complex  vitamins capsule Take 1 capsule by mouth daily      co-enzyme Q-10 30 MG capsule Take 30 mg by mouth 3 (three) times a day      hydrochlorothiazide (HYDRODIURIL) 25 mg tablet       losartan (COZAAR) 100 MG tablet Take 100 mg by mouth      metFORMIN (GLUCOPHAGE-XR) 500 mg 24 hr tablet       pantoprazole (PROTONIX) 40 mg tablet       Semaglutide (OZEMPIC, 0.25 OR 0.5 MG/DOSE, SC) Inject under the skin      simvastatin (ZOCOR) 40 mg tablet Take by mouth       No current facility-administered medications on file prior to visit.     Social History     Socioeconomic History    Marital status: /Civil Union     Spouse name: Not on file    Number of children: Not on file    Years of education: Not on file    Highest education level: Not on file   Occupational History    Not on file   Tobacco Use    Smoking status: Never    Smokeless tobacco: Never   Vaping Use    Vaping status: Never Used   Substance and Sexual Activity    Alcohol use: Yes     Alcohol/week: 14.0 standard drinks of alcohol     Types: 7 Cans of beer, 7 Standard drinks or equivalent per week     Comment: beer or   vodka    Drug use: No    Sexual activity: Yes     Partners: Female     Birth control/protection: Surgical   Other Topics Concern    Not on file   Social History Narrative    Not on file     Social Determinants of Health     Financial Resource Strain: Low Risk  (7/17/2024)    Received from [facility]    Overall Financial Resource Strain (CARDIA)     Difficulty of Paying Living Expenses: Not hard at all   Food Insecurity: No Food Insecurity (7/17/2024)    Received from [facility]    Hunger Vital Sign     Worried About Running Out of Food in the Last Year: Never true     Ran Out of Food in the Last Year: Never true   Transportation Needs: No Transportation Needs (7/17/2024)    Received from [facility]    PRAPARE - Transportation     Lack of Transportation (Medical): No     Lack of Transportation (Non-Medical): No   Physical  Activity: Sufficiently Active (7/17/2024)    Received from [facility]    Exercise Vital Sign     Days of Exercise per Week: 7 days     Minutes of Exercise per Session: 100 min   Stress: No Stress Concern Present (7/17/2024)    Received from [facility]    [place] [employer] - Occupational Stress Questionnaire     Feeling of Stress : Not at all   Social Connections: Socially Integrated (7/17/2024)    Received from [facility]    Social Connection and Isolation Panel [NHANES]     Frequency of Communication with Friends and Family: More than three times a week     Frequency of Social Gatherings with Friends and Family: More than three times a week     Attends Zoroastrian Services: More than 4 times per year     Active Member of Clubs or Organizations: Yes     Attends Club or Organization Meetings: More than 4 times per year     Marital Status:    Intimate Partner Violence: Not At Risk (7/17/2024)    Received from [facility]    Humiliation, Afraid, Rape, and Kick questionnaire     Fear of Current or Ex-Partner: No     Emotionally Abused: No     Physically Abused: No     Sexually Abused: No   Housing Stability: Unknown (11/10/2023)    Received from [facility], [facility]    Housing Stability Vital Sign     Unable to Pay for Housing in the Last Year: No     Number of Places Lived in the Last Year: Not on file     Unstable Housing in the Last Year: Not on file       I have reviewed the past medical, surgical, social and family history, medications and allergies as documented in the EMR.    Review of systems: ROS is negative other than that noted in the HPI.  Constitutional: Negative for fatigue and fever.   HENT: Negative for sore throat.    Respiratory: Negative for shortness of breath.    Cardiovascular: Negative for chest pain.   Gastrointestinal: Negative for abdominal pain.   Endocrine: Negative for cold intolerance and heat intolerance.   Genitourinary: Negative  "for flank pain.   Musculoskeletal: Negative for back pain.   Skin: Negative for rash.   Allergic/Immunologic: Negative for immunocompromised state.   Neurological: Negative for dizziness.   Psychiatric/Behavioral: Negative for agitation.      Physical Exam:    Blood pressure 148/92, pulse 79, height 5' 11\" (1.803 m), weight 122 kg (270 lb).    General/Constitutional: NAD, well developed, well nourished  HENT: Normocephalic, atraumatic  CV: Intact distal pulses, regular rate  Resp: No respiratory distress or labored breathing  Abdomen: soft, nondistended   Lymphatic: No lymphadenopathy palpated  Neuro: Alert and Oriented x 3, no focal deficits  Psych: Normal mood, normal affect  Skin: Warm, dry, no rashes, no erythema      Knee Exam:   No significant skin lesions or deformity  Range of motion from 0° to 125°  No medial joint line tenderness   Knee is stable to varus stress, Lachman, and posterior drawer.  Gapping with firm endpoint with valgus stress  Neurovascularly intact distally    Knee Imaging    X-rays reviewed again showing mild medial joint space narrowing without any acute fractures.          "

## 2024-10-04 ENCOUNTER — TELEPHONE (OUTPATIENT)
Age: 59
End: 2024-10-04

## 2024-10-04 NOTE — TELEPHONE ENCOUNTER
[employer] was calling to get patients last office visit faxed over. Upon review I didn't see that the patient was ever seen in office.

## 2025-02-24 ENCOUNTER — APPOINTMENT (OUTPATIENT)
Dept: RADIOLOGY | Facility: CLINIC | Age: 60
End: 2025-02-24
Payer: COMMERCIAL

## 2025-02-24 ENCOUNTER — OFFICE VISIT (OUTPATIENT)
Dept: OBGYN CLINIC | Facility: CLINIC | Age: 60
End: 2025-02-24
Payer: COMMERCIAL

## 2025-02-24 VITALS — BODY MASS INDEX: 38.36 KG/M2 | WEIGHT: 274 LBS | HEIGHT: 71 IN

## 2025-02-24 DIAGNOSIS — Z01.89 ENCOUNTER FOR LOWER EXTREMITY COMPARISON IMAGING STUDY: ICD-10-CM

## 2025-02-24 DIAGNOSIS — M17.12 PRIMARY OSTEOARTHRITIS OF LEFT KNEE: ICD-10-CM

## 2025-02-24 DIAGNOSIS — S80.02XA CONTUSION OF LEFT KNEE, INITIAL ENCOUNTER: ICD-10-CM

## 2025-02-24 DIAGNOSIS — M25.562 LEFT KNEE PAIN, UNSPECIFIED CHRONICITY: ICD-10-CM

## 2025-02-24 DIAGNOSIS — M23.42 LOOSE BODY OF LEFT KNEE: Primary | ICD-10-CM

## 2025-02-24 PROCEDURE — 73564 X-RAY EXAM KNEE 4 OR MORE: CPT

## 2025-02-24 PROCEDURE — 73562 X-RAY EXAM OF KNEE 3: CPT

## 2025-02-24 PROCEDURE — 99213 OFFICE O/P EST LOW 20 MIN: CPT | Performed by: ORTHOPAEDIC SURGERY

## 2025-02-24 NOTE — PROGRESS NOTES
Patient Name: Bhargav Roberto      : 1965       MRN: [id number]   Encounter Provider: Bryant Turner MD   Encounter Date: 25  Encounter department: [facility]         Assessment & Plan  Loose body of left knee  Extra-articular loose body.  Small loose body over the anterior aspect of the patella.  Tender and mobile after the injury.  I recommended conservative treatment, particularly padding when kneeling in this area.       Contusion of left knee, initial encounter  3 weeks s/p fall onto the anterior aspect the knee  Recommended conservative treatment and physical therapy.  Patient declined and will continue self-directed therapy and NSAIDs as needed  Orders:    XR knee 4+ vw left injury; Future    Encounter for lower extremity comparison imaging study    Orders:    XR knee 3 vw right non injury; Future       Plan:  Bhargav is a pleasant 60 y.o. male who presents for follow-up evaluation of the left knee after a fall 3 weeks ago.  He does have a small mobile loose body in the anterior aspect of the patella.  This may be related to calcification after the contusion versus a small fragment of bone fracture.  There is no identifiable fracture on the patella and no disruption of the extensor mechanism.  He has no findings concerning for acute intra-articular process aside from his arthritis.  I recommend he continue to monitor his symptoms, as his pain has improved over the past 3 weeks since the fall. He may begin physical therapy to strengthen the knee, which he deferred today. He may return to the office as needed for re-evaluation or a repeat corticosteroid injection as his work season approaches in the next month.    Follow-up:  Return if symptoms worsen or fail to improve.     _____________________________________________________  CHIEF COMPLAINT:  Chief Complaint   Patient presents with    Left Knee - Follow-up     Pt fell three weeks ago and landed on his  left knee.         SUBJECTIVE:  Bhargav Roberto is a 60 y.o. male who presents for follow-up evaluation of the left knee. He was last seen on 9/12/2024 and received a corticosteroid injection of the left knee. He sustained a fall on the left knee 3 weeks ago. He indicates his left knee pain is located anteriorly. He states this knee pain is different than what he's experienced in the past. He states it feels as though there is something moving around in the knee. He state he is sometimes able to palpate a lump in the anterior knee. He states the knee is not painful unless the lump is caught on something. He denies locking of the left knee. He denies taking any over the counter pain medications at this time. 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    Knee Surgical History:  None    PAST MEDICAL HISTORY:  Past Medical History:   Diagnosis Date    Acquired pes planus     Arthralgia of ankle     Calcaneal spur     Chronic hip pain     Colon polyp     COVID-19 01/19/2022    Diabetes mellitus (HCC)     recently put on RX for elevated HGB A1C    Diverticulitis     Essential hypertension 02/28/2016    Flat foot     Flat foot 02/28/2016    Gastric polyp     GERD (gastroesophageal reflux disease)     Hallux valgus     High cholesterol     Hyperlipidemia 02/28/2016    Hypertension     Plantar fibromatosis     Posterior tibial tendinitis        PAST SURGICAL HISTORY:  Past Surgical History:   Procedure Laterality Date    COLONOSCOPY      CT EPIDURAL STEROID INJECTION (RANDI LUMBAR)  11/27/2019    CT EPIDURAL STEROID INJECTION (RANDI LUMBAR)  06/05/2020    EVACUATION OF HEMATOMA Left     knee    FL INJECTION RIGHT HIP (NON ARTHROGRAM)  10/08/2019    HAND SURGERY      reconstruction of tendons in hand s/p inury/laceration    HIP SURGERY  1/21    OSTEOCHONDROMA EXCISION      left knee    [place] ARTHRP ACETBLR/PROX FEM PROSTC AGRFT/ALGRFT Right 02/29/2016    Procedure: ARTHROPLASTY HIP  "TOTAL ANTERIOR;  Surgeon: Gideon Winter MD;  Location:  MAIN OR;  Service: Orthopedics    [place] REVJ TOT HIP ARTHRP BTH W/WO AGRFT/ALGRFT Right 01/04/2021    Procedure: ARTHROPLASTY HIP TOTAL REVISION - RIGHT;  Surgeon: Anjel Cooper DO;  Location: [facility];  Service: Orthopedics       FAMILY HISTORY:  Family History   Problem Relation Age of Onset    Diabetes Mother     Atrial fibrillation Mother     Lymphoma Father     COPD Father     Heart disease Father        SOCIAL HISTORY:  Social History     Tobacco Use    Smoking status: Never    Smokeless tobacco: Never   Vaping Use    Vaping status: Never Used   Substance Use Topics    Alcohol use: Yes     Alcohol/week: 14.0 standard drinks of alcohol     Types: 7 Cans of beer, 7 Standard drinks or equivalent per week     Comment: beer or   vodka    Drug use: No       MEDICATIONS:    Current Outpatient Medications:     b complex vitamins capsule, Take 1 capsule by mouth daily, Disp: , Rfl:     co-enzyme Q-10 30 MG capsule, Take 30 mg by mouth 3 (three) times a day, Disp: , Rfl:     hydrochlorothiazide (HYDRODIURIL) 25 mg tablet, , Disp: , Rfl:     pantoprazole (PROTONIX) 40 mg tablet, , Disp: , Rfl:     Semaglutide (OZEMPIC, 0.25 OR 0.5 MG/DOSE, SC), Inject under the skin, Disp: , Rfl:     simvastatin (ZOCOR) 40 mg tablet, Take by mouth, Disp: , Rfl:     losartan (COZAAR) 100 MG tablet, Take 100 mg by mouth, Disp: , Rfl:     metFORMIN (GLUCOPHAGE-XR) 500 mg 24 hr tablet, , Disp: , Rfl:     ALLERGIES:  No Known Allergies    LABS:  HgA1c:   Lab Results   Component Value Date    HGBA1C 6.5 (H) 12/08/2020     BMP:   Lab Results   Component Value Date    CALCIUM 9.7 03/03/2023    K 4.3 03/03/2023    CO2 30 03/03/2023     03/03/2023    BUN 22 03/03/2023    CREATININE 1.13 03/03/2023     CBC: No components found for: \"CBC\"    _____________________________________________________  Review of systems: ROS is negative other than that noted in the HPI.  Constitutional: " Negative for fatigue and fever.   HENT: Negative for sore throat.    Respiratory: Negative for shortness of breath.    Cardiovascular: Negative for chest pain.   Gastrointestinal: Negative for abdominal pain.   Endocrine: Negative for cold intolerance and heat intolerance.   Genitourinary: Negative for flank pain.   Musculoskeletal: Negative for back pain.   Skin: Negative for rash.   Allergic/Immunologic: Negative for immunocompromised state.   Neurological: Negative for dizziness.   Psychiatric/Behavioral: Negative for agitation.     Knee Exam:   No significant skin lesions or deformity  Palpable extra-articular loose body anteriorly over patella  Range of motion from 0° to 125°  Patellar tenderness  Knee is stable to varus stress, valgus stress, Lachman, and posterior drawer.    Neurovascularly intact distally     Physical exam:  General/Constitutional: NAD, well developed, well nourished  HENT: Normocephalic, atraumatic  CV: Intact distal pulses, regular rate  Resp: No respiratory distress or labored breathing  Abdomen: soft, nondistended   Lymphatic: No lymphadenopathy palpated  Neuro: Alert and Oriented x 3, no focal deficits  Psych: Normal mood, normal affect  Skin: Warm, dry, no rashes, no erythema  _____________________________________________________  STUDIES REVIEWED:  X-rays of the left knee reviewed and interpreted today. These show severe degenerative changes of medial compartment; progressed since previous imaging on 12/4/2024. No acute fracture or dislocations.      PROCEDURES PERFORMED:  No Procedures performed today    Scribe Attestation      I,:  Shakira Hernandez am acting as a scribe while in the presence of the attending physician.:       I,:  Bryant Turner MD personally performed the services described in this documentation    as scribed in my presence.:

## (undated) DEVICE — SUT VICRYL 2-0 CT-1 27 IN J259H

## (undated) DEVICE — ABDUCTION PILLOW FOAM POSITIONER: Brand: CARDINAL HEALTH

## (undated) DEVICE — TIBURON SPLIT SHEET: Brand: CONVERTORS

## (undated) DEVICE — REPEL LITE CUT REST SURGICAL GLV LNRS X-LG: Brand: REPEL

## (undated) DEVICE — 3M™ STERI-DRAPE™ U-DRAPE 1015: Brand: STERI-DRAPE™

## (undated) DEVICE — 3M™ TEGADERM™ TRANSPARENT FILM DRESSING FRAME STYLE, 1626W, 4 IN X 4-3/4 IN (10 CM X 12 CM), 50/CT 4CT/CASE: Brand: 3M™ TEGADERM™

## (undated) DEVICE — 450 ML BOTTLE OF 0.05% CHLORHEXIDINE GLUCONATE IN 99.95% STERILE WATER FOR IRRIGATION, USP AND APPLICATOR.: Brand: IRRISEPT ANTIMICROBIAL WOUND LAVAGE

## (undated) DEVICE — CEMENT MIXING TOWER

## (undated) DEVICE — REPEL CUT REST SURGICAL GLV LNRS LG: Brand: REPEL

## (undated) DEVICE — 3M™ IOBAN™ 2 ANTIMICROBIAL INCISE DRAPE 6648EZ: Brand: IOBAN™ 2

## (undated) DEVICE — BIPOLAR SEALER 23-113-1 AQM 2.3: Brand: AQUAMANTYS™

## (undated) DEVICE — CHLORAPREP HI-LITE 26ML ORANGE

## (undated) DEVICE — GLOVE SRG BIOGEL 8.5

## (undated) DEVICE — SUT VICRYL 0 CP-1 27 IN J267H

## (undated) DEVICE — GLOVE INDICATOR PI UNDERGLOVE SZ 7.5 BLUE

## (undated) DEVICE — VEST SURGEON DISPOSABLE

## (undated) DEVICE — DRESSING MEPILEX AG BORDER 4 X 12 IN

## (undated) DEVICE — ADHESIVE SKIN CLOSR DERMABOND PRINEO

## (undated) DEVICE — SUT ETHIBOND 2 V-37 30 IN MX69G

## (undated) DEVICE — GLOVE SRG BIOGEL 8

## (undated) DEVICE — 3M™ STERI-DRAPE™ LARGE X-RAY CASSETTE DRAPE 1009: Brand: STERI-DRAPE™

## (undated) DEVICE — 3M™ IOBAN™ 2 ANTIMICROBIAL INCISE DRAPE 6651EZ: Brand: IOBAN™ 2

## (undated) DEVICE — SUT STRATAFIX SPIRAL 3-0 PGA/PCL 30 X 30 CM SXMD2B410

## (undated) DEVICE — Device

## (undated) DEVICE — ORTHOPEDIC PACK: Brand: CARDINAL HEALTH

## (undated) DEVICE — DUAL CUT SAGITTAL BLADE

## (undated) DEVICE — SUT STRATAFIX SPIRAL 1-0  CT-1 30 X 30CM SXPD2B403

## (undated) DEVICE — SKIN MARKER DUAL TIP WITH RULER CAP, FLEXIBLE RULER AND LABELS: Brand: DEVON

## (undated) DEVICE — ELECTRODE BLADE E-Z CLEAN 6.5IN -0014

## (undated) DEVICE — PREP SURGICAL PURPREP 26ML

## (undated) DEVICE — HANDPIECE SET WITH HIGH FLOW TIP AND SUCTION TUBE: Brand: INTERPULSE

## (undated) DEVICE — TUBE MINI KAMVAC SUCTION 7310 7 LATEX FREE

## (undated) DEVICE — ASTOUND SURGICAL GOWN, XXX LARGE, X-LONG: Brand: CONVERTORS

## (undated) DEVICE — GLOVE INDICATOR PI UNDERGLOVE SZ 8.5 BLUE

## (undated) DEVICE — TOOL 15MH22 LEGEND 15CM 2.2MM MH: Brand: MIDAS REX ™

## (undated) DEVICE — HOOD: Brand: FLYTE, SURGICOOL

## (undated) DEVICE — INSTRUMENT POUCH: Brand: CONVERTORS

## (undated) DEVICE — BASIC DOUBLE BASIN 2-LF: Brand: MEDLINE INDUSTRIES, INC.